# Patient Record
Sex: MALE | Race: OTHER | HISPANIC OR LATINO | URBAN - METROPOLITAN AREA
[De-identification: names, ages, dates, MRNs, and addresses within clinical notes are randomized per-mention and may not be internally consistent; named-entity substitution may affect disease eponyms.]

---

## 2019-01-01 ENCOUNTER — EMERGENCY (EMERGENCY)
Facility: HOSPITAL | Age: 59
LOS: 1 days | Discharge: HOME | End: 2019-01-01
Attending: EMERGENCY MEDICINE | Admitting: EMERGENCY MEDICINE
Payer: COMMERCIAL

## 2019-01-01 ENCOUNTER — EMERGENCY (EMERGENCY)
Facility: HOSPITAL | Age: 59
LOS: 1 days | Discharge: HOME | End: 2019-01-01
Attending: EMERGENCY MEDICINE
Payer: COMMERCIAL

## 2019-01-01 VITALS
HEIGHT: 64 IN | SYSTOLIC BLOOD PRESSURE: 191 MMHG | DIASTOLIC BLOOD PRESSURE: 103 MMHG | TEMPERATURE: 98 F | OXYGEN SATURATION: 96 % | WEIGHT: 164.91 LBS | RESPIRATION RATE: 16 BRPM | HEART RATE: 96 BPM

## 2019-01-01 VITALS
SYSTOLIC BLOOD PRESSURE: 169 MMHG | RESPIRATION RATE: 16 BRPM | DIASTOLIC BLOOD PRESSURE: 97 MMHG | HEART RATE: 79 BPM | OXYGEN SATURATION: 97 %

## 2019-01-01 VITALS
RESPIRATION RATE: 16 BRPM | WEIGHT: 164.91 LBS | OXYGEN SATURATION: 95 % | TEMPERATURE: 97 F | SYSTOLIC BLOOD PRESSURE: 137 MMHG | HEART RATE: 75 BPM | DIASTOLIC BLOOD PRESSURE: 84 MMHG

## 2019-01-01 LAB
ALBUMIN SERPL ELPH-MCNC: 3.9 G/DL — SIGNIFICANT CHANGE UP (ref 3.3–5)
ALP SERPL-CCNC: 79 U/L — SIGNIFICANT CHANGE UP (ref 40–120)
ALT FLD-CCNC: 18 U/L DA — SIGNIFICANT CHANGE UP (ref 10–45)
ANION GAP SERPL CALC-SCNC: 10 MMOL/L — SIGNIFICANT CHANGE UP (ref 5–17)
APPEARANCE UR: CLEAR — SIGNIFICANT CHANGE UP
AST SERPL-CCNC: 23 U/L — SIGNIFICANT CHANGE UP (ref 10–40)
BACTERIA # UR AUTO: NEGATIVE /HPF — SIGNIFICANT CHANGE UP
BASOPHILS # BLD AUTO: 0.02 K/UL — SIGNIFICANT CHANGE UP (ref 0–0.2)
BASOPHILS NFR BLD AUTO: 0.4 % — SIGNIFICANT CHANGE UP (ref 0–2)
BILIRUB SERPL-MCNC: 0.5 MG/DL — SIGNIFICANT CHANGE UP (ref 0.2–1.2)
BILIRUB UR-MCNC: NEGATIVE — SIGNIFICANT CHANGE UP
BUN SERPL-MCNC: 15 MG/DL — SIGNIFICANT CHANGE UP (ref 7–23)
CALCIUM SERPL-MCNC: 9.1 MG/DL — SIGNIFICANT CHANGE UP (ref 8.4–10.5)
CHLORIDE SERPL-SCNC: 103 MMOL/L — SIGNIFICANT CHANGE UP (ref 96–108)
CO2 SERPL-SCNC: 28 MMOL/L — SIGNIFICANT CHANGE UP (ref 22–31)
COLOR SPEC: YELLOW — SIGNIFICANT CHANGE UP
CREAT SERPL-MCNC: 1.07 MG/DL — SIGNIFICANT CHANGE UP (ref 0.5–1.3)
CULTURE RESULTS: NO GROWTH — SIGNIFICANT CHANGE UP
DIFF PNL FLD: ABNORMAL
EOSINOPHIL # BLD AUTO: 0.03 K/UL — SIGNIFICANT CHANGE UP (ref 0–0.5)
EOSINOPHIL NFR BLD AUTO: 0.6 % — SIGNIFICANT CHANGE UP (ref 0–6)
EPI CELLS # UR: SIGNIFICANT CHANGE UP
GLUCOSE SERPL-MCNC: 103 MG/DL — HIGH (ref 70–99)
GLUCOSE UR QL: NEGATIVE — SIGNIFICANT CHANGE UP
HCT VFR BLD CALC: 44 % — SIGNIFICANT CHANGE UP (ref 39–50)
HGB BLD-MCNC: 14.9 G/DL — SIGNIFICANT CHANGE UP (ref 13–17)
IMM GRANULOCYTES NFR BLD AUTO: 0.4 % — SIGNIFICANT CHANGE UP (ref 0–1.5)
KETONES UR-MCNC: NEGATIVE — SIGNIFICANT CHANGE UP
LEUKOCYTE ESTERASE UR-ACNC: NEGATIVE — SIGNIFICANT CHANGE UP
LYMPHOCYTES # BLD AUTO: 2.07 K/UL — SIGNIFICANT CHANGE UP (ref 1–3.3)
LYMPHOCYTES # BLD AUTO: 43.8 % — SIGNIFICANT CHANGE UP (ref 13–44)
MCHC RBC-ENTMCNC: 31.8 PG — SIGNIFICANT CHANGE UP (ref 27–34)
MCHC RBC-ENTMCNC: 33.9 GM/DL — SIGNIFICANT CHANGE UP (ref 32–36)
MCV RBC AUTO: 94 FL — SIGNIFICANT CHANGE UP (ref 80–100)
MONOCYTES # BLD AUTO: 0.39 K/UL — SIGNIFICANT CHANGE UP (ref 0–0.9)
MONOCYTES NFR BLD AUTO: 8.2 % — SIGNIFICANT CHANGE UP (ref 2–14)
NEUTROPHILS # BLD AUTO: 2.2 K/UL — SIGNIFICANT CHANGE UP (ref 1.8–7.4)
NEUTROPHILS NFR BLD AUTO: 46.6 % — SIGNIFICANT CHANGE UP (ref 43–77)
NITRITE UR-MCNC: NEGATIVE — SIGNIFICANT CHANGE UP
NRBC # BLD: 0 /100 WBCS — SIGNIFICANT CHANGE UP (ref 0–0)
PCP SPEC-MCNC: SIGNIFICANT CHANGE UP
PH UR: 5 — SIGNIFICANT CHANGE UP (ref 5–8)
PLATELET # BLD AUTO: 293 K/UL — SIGNIFICANT CHANGE UP (ref 150–400)
POTASSIUM SERPL-MCNC: 4.3 MMOL/L — SIGNIFICANT CHANGE UP (ref 3.5–5.3)
POTASSIUM SERPL-SCNC: 4.3 MMOL/L — SIGNIFICANT CHANGE UP (ref 3.5–5.3)
PROT SERPL-MCNC: 8.7 G/DL — HIGH (ref 6–8.3)
PROT UR-MCNC: 15
RBC # BLD: 4.68 M/UL — SIGNIFICANT CHANGE UP (ref 4.2–5.8)
RBC # FLD: 14.2 % — SIGNIFICANT CHANGE UP (ref 10.3–14.5)
RBC CASTS # UR COMP ASSIST: NEGATIVE /HPF — SIGNIFICANT CHANGE UP (ref 0–4)
SODIUM SERPL-SCNC: 141 MMOL/L — SIGNIFICANT CHANGE UP (ref 135–145)
SP GR SPEC: 1.02 — SIGNIFICANT CHANGE UP (ref 1.01–1.02)
SPECIMEN SOURCE: SIGNIFICANT CHANGE UP
UROBILINOGEN FLD QL: NEGATIVE — SIGNIFICANT CHANGE UP
WBC # BLD: 4.73 K/UL — SIGNIFICANT CHANGE UP (ref 3.8–10.5)
WBC # FLD AUTO: 4.73 K/UL — SIGNIFICANT CHANGE UP (ref 3.8–10.5)
WBC UR QL: NEGATIVE /HPF — SIGNIFICANT CHANGE UP (ref 0–5)

## 2019-01-01 PROCEDURE — 72125 CT NECK SPINE W/O DYE: CPT

## 2019-01-01 PROCEDURE — 70450 CT HEAD/BRAIN W/O DYE: CPT | Mod: 26

## 2019-01-01 PROCEDURE — 99283 EMERGENCY DEPT VISIT LOW MDM: CPT

## 2019-01-01 PROCEDURE — 36415 COLL VENOUS BLD VENIPUNCTURE: CPT

## 2019-01-01 PROCEDURE — 81001 URINALYSIS AUTO W/SCOPE: CPT

## 2019-01-01 PROCEDURE — 96374 THER/PROPH/DIAG INJ IV PUSH: CPT

## 2019-01-01 PROCEDURE — 87086 URINE CULTURE/COLONY COUNT: CPT

## 2019-01-01 PROCEDURE — 80053 COMPREHEN METABOLIC PANEL: CPT

## 2019-01-01 PROCEDURE — 72125 CT NECK SPINE W/O DYE: CPT | Mod: 26

## 2019-01-01 PROCEDURE — 99284 EMERGENCY DEPT VISIT MOD MDM: CPT | Mod: 25

## 2019-01-01 PROCEDURE — 85027 COMPLETE CBC AUTOMATED: CPT

## 2019-01-01 PROCEDURE — 99284 EMERGENCY DEPT VISIT MOD MDM: CPT

## 2019-01-01 PROCEDURE — 74176 CT ABD & PELVIS W/O CONTRAST: CPT | Mod: 26

## 2019-01-01 PROCEDURE — 80307 DRUG TEST PRSMV CHEM ANLYZR: CPT

## 2019-01-01 PROCEDURE — 70450 CT HEAD/BRAIN W/O DYE: CPT

## 2019-01-01 PROCEDURE — 74176 CT ABD & PELVIS W/O CONTRAST: CPT

## 2019-01-01 RX ORDER — KETOROLAC TROMETHAMINE 30 MG/ML
30 SYRINGE (ML) INJECTION ONCE
Refills: 0 | Status: DISCONTINUED | OUTPATIENT
Start: 2019-01-01 | End: 2019-01-01

## 2019-01-01 RX ADMIN — Medication 30 MILLIGRAM(S): at 10:04

## 2019-01-01 RX ADMIN — Medication 30 MILLIGRAM(S): at 09:49

## 2019-08-05 NOTE — ED PROVIDER NOTE - NSFOLLOWUPINSTRUCTIONS_ED_ALL_ED_FT
Worsening, continued or ANY new concerning symptoms return to the emergency department.    Follow up with your PMD within 48-72 hrs. Show copies of your reports given to you. Take tylenol 650mg every 6 hours as needed for pain.     Work note is attached per request.

## 2019-08-05 NOTE — ED ADULT NURSE NOTE - OBJECTIVE STATEMENT
Pt presents to ED with left flank pain. Pt states he has a history of kidney stones and he feels that this is the same pain. Pt states the pain started this morning and he has not taken any medications for it. Denies dysuria. Denies fever.

## 2019-08-05 NOTE — ED PROVIDER NOTE - CLINICAL SUMMARY MEDICAL DECISION MAKING FREE TEXT BOX
Pt presents to ED with left flank pain. Pt states he has a history of kidney stones and he feels that this is the same pain. Pt states the pain started this morning and he has not taken any medications for it. Denies dysuria. Denies fever. Pt very comfortable appearing. No distress. Says he needs a note for work. Patient without pulsatile abd mass. Pulses equal b/l. BP stable. Consider r/o stone. Provide note for work. He is asking to return on Wednesday.

## 2019-09-21 NOTE — ED PROVIDER NOTE - CARE PLAN
Principal Discharge DX:	Contusion of scalp, initial encounter  Secondary Diagnosis:	Alcoholic intoxication without complication

## 2019-09-21 NOTE — ED ADULT NURSE NOTE - OBJECTIVE STATEMENT
Pt states that he fell in his house and hit his head , pt also reports that he almost passed out. Pt admits that he has been drinking alcohol tonight.

## 2019-09-21 NOTE — ED PROVIDER NOTE - PATIENT PORTAL LINK FT
You can access the FollowMyHealth Patient Portal offered by [facility] by registering at the following website: [email or web address]. By joining [employer]’s FollowMyHealth portal, you will also be able to view your health information using other applications (apps) compatible with our system.

## 2019-09-21 NOTE — ED PROVIDER NOTE - CLINICAL SUMMARY MEDICAL DECISION MAKING FREE TEXT BOX
59 year old male etoh fall. vitals WNL. PE as above.  ct head/cervical spine WNL. utox negative. will dc. f/u PMD. return precuations given.

## 2019-09-21 NOTE — ED ADULT TRIAGE NOTE - CHIEF COMPLAINT QUOTE
I fell in my house and hit my head 30 min ago , I almost passed out I fell in my house and hit my head 30 min ago , I almost passed out. Pt admits that he has been drinking alcohol tonight.

## 2019-09-21 NOTE — ED ADULT NURSE NOTE - CHIEF COMPLAINT QUOTE
I fell in my house and hit my head 30 min ago , I almost passed out. Pt admits that he has been drinking alcohol tonight.

## 2019-09-21 NOTE — ED PROVIDER NOTE - MUSCULOSKELETAL, MLM
Spine appears normal, range of motion is not limited, no muscle or joint tenderness.BILATERAL SHOULDER NONTENDER WITH FULL ROM. NONTENDER TO PALPATION TO NECK AND BACK.

## 2019-09-21 NOTE — ED PROVIDER NOTE - OBJECTIVE STATEMENT
58 y/o M pt with no significant PMHx and no significant PSHx was brought into the ED via EMS s/p fall missing 1 step with alcohol intoxification. Pt denies loc and states he was able to get up with initial pain to bilateral shoulders which has resolved. Pt states he hit his head and has pain to the back of his head. Patient denies any other complaints. NKDA.

## 2019-09-21 NOTE — ED PROVIDER NOTE - ENMT, MLM
Airway patent, Nasal mucosa clear. Mouth with normal mucosa. Throat has no vesicles, no oropharyngeal exudates and uvula is midline. HEMATOMA OF OCCIPUT.

## 2020-01-01 ENCOUNTER — EMERGENCY (EMERGENCY)
Facility: HOSPITAL | Age: 60
LOS: 1 days | Discharge: HOME | End: 2020-01-01
Attending: EMERGENCY MEDICINE
Payer: COMMERCIAL

## 2020-01-01 ENCOUNTER — INPATIENT (INPATIENT)
Facility: HOSPITAL | Age: 60
LOS: 19 days | End: 2020-04-20
Attending: INTERNAL MEDICINE | Admitting: INTERNAL MEDICINE
Payer: COMMERCIAL

## 2020-01-01 ENCOUNTER — INPATIENT (INPATIENT)
Facility: HOSPITAL | Age: 60
LOS: 0 days | Discharge: HOME | DRG: 177 | End: 2020-04-01
Attending: HOSPITALIST | Admitting: HOSPITALIST
Payer: SELF-PAY

## 2020-01-01 VITALS
TEMPERATURE: 98 F | DIASTOLIC BLOOD PRESSURE: 66 MMHG | OXYGEN SATURATION: 100 % | RESPIRATION RATE: 22 BRPM | SYSTOLIC BLOOD PRESSURE: 116 MMHG | HEART RATE: 91 BPM

## 2020-01-01 VITALS
RESPIRATION RATE: 20 BRPM | SYSTOLIC BLOOD PRESSURE: 115 MMHG | HEART RATE: 114 BPM | HEIGHT: 64 IN | DIASTOLIC BLOOD PRESSURE: 88 MMHG | OXYGEN SATURATION: 94 % | TEMPERATURE: 101 F | WEIGHT: 166.23 LBS

## 2020-01-01 VITALS
OXYGEN SATURATION: 96 % | WEIGHT: 175.05 LBS | HEART RATE: 100 BPM | TEMPERATURE: 98 F | SYSTOLIC BLOOD PRESSURE: 170 MMHG | HEIGHT: 66 IN | DIASTOLIC BLOOD PRESSURE: 90 MMHG | RESPIRATION RATE: 17 BRPM

## 2020-01-01 VITALS — SYSTOLIC BLOOD PRESSURE: 121 MMHG | OXYGEN SATURATION: 100 % | DIASTOLIC BLOOD PRESSURE: 52 MMHG | HEART RATE: 94 BPM

## 2020-01-01 VITALS
DIASTOLIC BLOOD PRESSURE: 87 MMHG | HEIGHT: 64 IN | WEIGHT: 166.89 LBS | RESPIRATION RATE: 24 BRPM | SYSTOLIC BLOOD PRESSURE: 144 MMHG | OXYGEN SATURATION: 84 % | HEART RATE: 125 BPM | TEMPERATURE: 98 F

## 2020-01-01 DIAGNOSIS — J18.9 PNEUMONIA, UNSPECIFIED ORGANISM: ICD-10-CM

## 2020-01-01 DIAGNOSIS — B97.21 SARS-ASSOCIATED CORONAVIRUS AS THE CAUSE OF DISEASES CLASSIFIED ELSEWHERE: ICD-10-CM

## 2020-01-01 DIAGNOSIS — Z29.9 ENCOUNTER FOR PROPHYLACTIC MEASURES, UNSPECIFIED: ICD-10-CM

## 2020-01-01 LAB
ABO RH CONFIRMATION: SIGNIFICANT CHANGE UP
ALBUMIN SERPL ELPH-MCNC: 2 G/DL — LOW (ref 3.5–5.2)
ALBUMIN SERPL ELPH-MCNC: 2 G/DL — LOW (ref 3.5–5.2)
ALBUMIN SERPL ELPH-MCNC: 2.1 G/DL — LOW (ref 3.5–5.2)
ALBUMIN SERPL ELPH-MCNC: 2.2 G/DL — LOW (ref 3.5–5.2)
ALBUMIN SERPL ELPH-MCNC: 2.2 G/DL — LOW (ref 3.5–5.2)
ALBUMIN SERPL ELPH-MCNC: 2.3 G/DL — LOW (ref 3.5–5.2)
ALBUMIN SERPL ELPH-MCNC: 2.4 G/DL — LOW (ref 3.5–5.2)
ALBUMIN SERPL ELPH-MCNC: 2.4 G/DL — LOW (ref 3.5–5.2)
ALBUMIN SERPL ELPH-MCNC: 2.5 G/DL — LOW (ref 3.5–5.2)
ALBUMIN SERPL ELPH-MCNC: 2.5 G/DL — LOW (ref 3.5–5.2)
ALBUMIN SERPL ELPH-MCNC: 2.6 G/DL — LOW (ref 3.5–5.2)
ALBUMIN SERPL ELPH-MCNC: 2.7 G/DL — LOW (ref 3.5–5.2)
ALBUMIN SERPL ELPH-MCNC: 2.9 G/DL — LOW (ref 3.5–5)
ALBUMIN SERPL ELPH-MCNC: 3 G/DL — LOW (ref 3.5–5.2)
ALBUMIN SERPL ELPH-MCNC: 3 G/DL — LOW (ref 3.5–5.2)
ALBUMIN SERPL ELPH-MCNC: 3.2 G/DL — LOW (ref 3.5–5.2)
ALBUMIN SERPL ELPH-MCNC: 3.4 G/DL — LOW (ref 3.5–5.2)
ALP SERPL-CCNC: 52 U/L — SIGNIFICANT CHANGE UP (ref 30–115)
ALP SERPL-CCNC: 52 U/L — SIGNIFICANT CHANGE UP (ref 30–115)
ALP SERPL-CCNC: 55 U/L — SIGNIFICANT CHANGE UP (ref 30–115)
ALP SERPL-CCNC: 60 U/L — SIGNIFICANT CHANGE UP (ref 30–115)
ALP SERPL-CCNC: 60 U/L — SIGNIFICANT CHANGE UP (ref 30–115)
ALP SERPL-CCNC: 61 U/L — SIGNIFICANT CHANGE UP (ref 30–115)
ALP SERPL-CCNC: 61 U/L — SIGNIFICANT CHANGE UP (ref 30–115)
ALP SERPL-CCNC: 62 U/L — SIGNIFICANT CHANGE UP (ref 30–115)
ALP SERPL-CCNC: 64 U/L — SIGNIFICANT CHANGE UP (ref 30–115)
ALP SERPL-CCNC: 64 U/L — SIGNIFICANT CHANGE UP (ref 30–115)
ALP SERPL-CCNC: 66 U/L — SIGNIFICANT CHANGE UP (ref 40–120)
ALP SERPL-CCNC: 68 U/L — SIGNIFICANT CHANGE UP (ref 30–115)
ALP SERPL-CCNC: 70 U/L — SIGNIFICANT CHANGE UP (ref 30–115)
ALP SERPL-CCNC: 71 U/L — SIGNIFICANT CHANGE UP (ref 30–115)
ALP SERPL-CCNC: 71 U/L — SIGNIFICANT CHANGE UP (ref 30–115)
ALP SERPL-CCNC: 73 U/L — SIGNIFICANT CHANGE UP (ref 30–115)
ALP SERPL-CCNC: 74 U/L — SIGNIFICANT CHANGE UP (ref 30–115)
ALP SERPL-CCNC: 75 U/L — SIGNIFICANT CHANGE UP (ref 30–115)
ALP SERPL-CCNC: 77 U/L — SIGNIFICANT CHANGE UP (ref 30–115)
ALT FLD-CCNC: 115 U/L — HIGH (ref 0–41)
ALT FLD-CCNC: 129 U/L — HIGH (ref 0–41)
ALT FLD-CCNC: 135 U/L — HIGH (ref 0–41)
ALT FLD-CCNC: 157 U/L — HIGH (ref 0–41)
ALT FLD-CCNC: 158 U/L — HIGH (ref 0–41)
ALT FLD-CCNC: 174 U/L — HIGH (ref 0–41)
ALT FLD-CCNC: 228 U/L — HIGH (ref 0–41)
ALT FLD-CCNC: 236 U/L — HIGH (ref 0–41)
ALT FLD-CCNC: 240 U/L — HIGH (ref 0–41)
ALT FLD-CCNC: 246 U/L — HIGH (ref 0–41)
ALT FLD-CCNC: 249 U/L — HIGH (ref 0–41)
ALT FLD-CCNC: 333 U/L — HIGH (ref 0–41)
ALT FLD-CCNC: 36 U/L — SIGNIFICANT CHANGE UP (ref 0–41)
ALT FLD-CCNC: 46 U/L — HIGH (ref 0–41)
ALT FLD-CCNC: 49 U/L — HIGH (ref 0–41)
ALT FLD-CCNC: 49 U/L — HIGH (ref 0–41)
ALT FLD-CCNC: 52 U/L — HIGH (ref 0–41)
ALT FLD-CCNC: 69 U/L — HIGH (ref 0–41)
ALT FLD-CCNC: 73 U/L — HIGH (ref 0–41)
ALT FLD-CCNC: 78 U/L DA — HIGH (ref 10–60)
ALT FLD-CCNC: 96 U/L — HIGH (ref 0–41)
ANION GAP SERPL CALC-SCNC: 12 MMOL/L — SIGNIFICANT CHANGE UP (ref 7–14)
ANION GAP SERPL CALC-SCNC: 13 MMOL/L — SIGNIFICANT CHANGE UP (ref 7–14)
ANION GAP SERPL CALC-SCNC: 14 MMOL/L — SIGNIFICANT CHANGE UP (ref 7–14)
ANION GAP SERPL CALC-SCNC: 15 MMOL/L — HIGH (ref 7–14)
ANION GAP SERPL CALC-SCNC: 16 MMOL/L — HIGH (ref 7–14)
ANION GAP SERPL CALC-SCNC: 16 MMOL/L — HIGH (ref 7–14)
ANION GAP SERPL CALC-SCNC: 17 MMOL/L — HIGH (ref 7–14)
ANION GAP SERPL CALC-SCNC: 18 MMOL/L — HIGH (ref 7–14)
ANION GAP SERPL CALC-SCNC: 21 MMOL/L — HIGH (ref 7–14)
ANION GAP SERPL CALC-SCNC: 21 MMOL/L — HIGH (ref 7–14)
ANION GAP SERPL CALC-SCNC: 22 MMOL/L — HIGH (ref 7–14)
ANION GAP SERPL CALC-SCNC: 22 MMOL/L — HIGH (ref 7–14)
ANION GAP SERPL CALC-SCNC: 23 MMOL/L — HIGH (ref 7–14)
ANION GAP SERPL CALC-SCNC: 29 MMOL/L — HIGH (ref 7–14)
ANION GAP SERPL CALC-SCNC: 8 MMOL/L — SIGNIFICANT CHANGE UP (ref 5–17)
ANION GAP SERPL CALC-SCNC: 9 MMOL/L — SIGNIFICANT CHANGE UP (ref 7–14)
ANISOCYTOSIS BLD QL: SLIGHT — SIGNIFICANT CHANGE UP
ANISOCYTOSIS BLD QL: SLIGHT — SIGNIFICANT CHANGE UP
APPEARANCE UR: ABNORMAL
APTT BLD: 25.6 SEC — LOW (ref 27–39.2)
AST SERPL-CCNC: 101 U/L — HIGH (ref 0–41)
AST SERPL-CCNC: 123 U/L — HIGH (ref 0–41)
AST SERPL-CCNC: 153 U/L — HIGH (ref 0–41)
AST SERPL-CCNC: 181 U/L — HIGH (ref 0–41)
AST SERPL-CCNC: 185 U/L — HIGH (ref 0–41)
AST SERPL-CCNC: 23 U/L — SIGNIFICANT CHANGE UP (ref 0–41)
AST SERPL-CCNC: 25 U/L — SIGNIFICANT CHANGE UP (ref 0–41)
AST SERPL-CCNC: 261 U/L — HIGH (ref 0–41)
AST SERPL-CCNC: 36 U/L — SIGNIFICANT CHANGE UP (ref 0–41)
AST SERPL-CCNC: 42 U/L — HIGH (ref 0–41)
AST SERPL-CCNC: 46 U/L — HIGH (ref 0–41)
AST SERPL-CCNC: 54 U/L — HIGH (ref 0–41)
AST SERPL-CCNC: 58 U/L — HIGH (ref 0–41)
AST SERPL-CCNC: 59 U/L — HIGH (ref 0–41)
AST SERPL-CCNC: 62 U/L — HIGH (ref 0–41)
AST SERPL-CCNC: 64 U/L — HIGH (ref 0–41)
AST SERPL-CCNC: 69 U/L — HIGH (ref 0–41)
AST SERPL-CCNC: 71 U/L — HIGH (ref 0–41)
AST SERPL-CCNC: 79 U/L — HIGH (ref 0–41)
AST SERPL-CCNC: 88 U/L — HIGH (ref 10–40)
AST SERPL-CCNC: 91 U/L — HIGH (ref 0–41)
BACTERIA # UR AUTO: NEGATIVE — SIGNIFICANT CHANGE UP
BASE EXCESS BLDA CALC-SCNC: -1.8 MMOL/L — SIGNIFICANT CHANGE UP (ref -2–2)
BASE EXCESS BLDA CALC-SCNC: -4.5 MMOL/L — LOW (ref -2–2)
BASE EXCESS BLDA CALC-SCNC: -5.1 MMOL/L — LOW (ref -2–2)
BASE EXCESS BLDA CALC-SCNC: -6.9 MMOL/L — LOW (ref -2–2)
BASE EXCESS BLDA CALC-SCNC: -7.3 MMOL/L — LOW (ref -2–2)
BASE EXCESS BLDA CALC-SCNC: 1.1 MMOL/L — SIGNIFICANT CHANGE UP (ref -2–2)
BASE EXCESS BLDA CALC-SCNC: 1.4 MMOL/L — SIGNIFICANT CHANGE UP (ref -2–2)
BASE EXCESS BLDA CALC-SCNC: 2.3 MMOL/L — HIGH (ref -2–2)
BASE EXCESS BLDA CALC-SCNC: 3.6 MMOL/L — HIGH (ref -2–2)
BASE EXCESS BLDA CALC-SCNC: 5.3 MMOL/L — HIGH (ref -2–2)
BASE EXCESS BLDA CALC-SCNC: 7.4 MMOL/L — HIGH (ref -2–2)
BASE EXCESS BLDA CALC-SCNC: 9.6 MMOL/L — HIGH (ref -2–2)
BASOPHILS # BLD AUTO: 0 K/UL — SIGNIFICANT CHANGE UP (ref 0–0.2)
BASOPHILS # BLD AUTO: 0 K/UL — SIGNIFICANT CHANGE UP (ref 0–0.2)
BASOPHILS # BLD AUTO: 0.01 K/UL — SIGNIFICANT CHANGE UP (ref 0–0.2)
BASOPHILS # BLD AUTO: 0.02 K/UL — SIGNIFICANT CHANGE UP (ref 0–0.2)
BASOPHILS # BLD AUTO: 0.03 K/UL — SIGNIFICANT CHANGE UP (ref 0–0.2)
BASOPHILS # BLD AUTO: 0.04 K/UL — SIGNIFICANT CHANGE UP (ref 0–0.2)
BASOPHILS # BLD AUTO: 0.05 K/UL — SIGNIFICANT CHANGE UP (ref 0–0.2)
BASOPHILS # BLD AUTO: 0.07 K/UL — SIGNIFICANT CHANGE UP (ref 0–0.2)
BASOPHILS # BLD AUTO: 0.16 K/UL — SIGNIFICANT CHANGE UP (ref 0–0.2)
BASOPHILS NFR BLD AUTO: 0 % — SIGNIFICANT CHANGE UP (ref 0–1)
BASOPHILS NFR BLD AUTO: 0 % — SIGNIFICANT CHANGE UP (ref 0–1)
BASOPHILS NFR BLD AUTO: 0.1 % — SIGNIFICANT CHANGE UP (ref 0–1)
BASOPHILS NFR BLD AUTO: 0.1 % — SIGNIFICANT CHANGE UP (ref 0–2)
BASOPHILS NFR BLD AUTO: 0.2 % — SIGNIFICANT CHANGE UP (ref 0–1)
BASOPHILS NFR BLD AUTO: 0.3 % — SIGNIFICANT CHANGE UP (ref 0–1)
BASOPHILS NFR BLD AUTO: 0.5 % — SIGNIFICANT CHANGE UP (ref 0–1)
BASOPHILS NFR BLD AUTO: 0.9 % — SIGNIFICANT CHANGE UP (ref 0–1)
BILIRUB SERPL-MCNC: 0.2 MG/DL — SIGNIFICANT CHANGE UP (ref 0.2–1.2)
BILIRUB SERPL-MCNC: 0.3 MG/DL — SIGNIFICANT CHANGE UP (ref 0.2–1.2)
BILIRUB SERPL-MCNC: 0.4 MG/DL — SIGNIFICANT CHANGE UP (ref 0.2–1.2)
BILIRUB SERPL-MCNC: 0.5 MG/DL — SIGNIFICANT CHANGE UP (ref 0.2–1.2)
BILIRUB SERPL-MCNC: 0.6 MG/DL — SIGNIFICANT CHANGE UP (ref 0.2–1.2)
BILIRUB SERPL-MCNC: 0.6 MG/DL — SIGNIFICANT CHANGE UP (ref 0.2–1.2)
BILIRUB SERPL-MCNC: 0.7 MG/DL — SIGNIFICANT CHANGE UP (ref 0.2–1.2)
BILIRUB SERPL-MCNC: <0.2 MG/DL — SIGNIFICANT CHANGE UP (ref 0.2–1.2)
BILIRUB UR-MCNC: NEGATIVE — SIGNIFICANT CHANGE UP
BLD GP AB SCN SERPL QL: SIGNIFICANT CHANGE UP
BUN SERPL-MCNC: 101 MG/DL — CRITICAL HIGH (ref 10–20)
BUN SERPL-MCNC: 102 MG/DL — CRITICAL HIGH (ref 10–20)
BUN SERPL-MCNC: 103 MG/DL — CRITICAL HIGH (ref 10–20)
BUN SERPL-MCNC: 107 MG/DL — CRITICAL HIGH (ref 10–20)
BUN SERPL-MCNC: 120 MG/DL — CRITICAL HIGH (ref 10–20)
BUN SERPL-MCNC: 126 MG/DL — CRITICAL HIGH (ref 10–20)
BUN SERPL-MCNC: 134 MG/DL — CRITICAL HIGH (ref 10–20)
BUN SERPL-MCNC: 134 MG/DL — CRITICAL HIGH (ref 10–20)
BUN SERPL-MCNC: 150 MG/DL — CRITICAL HIGH (ref 10–20)
BUN SERPL-MCNC: 150 MG/DL — CRITICAL HIGH (ref 10–20)
BUN SERPL-MCNC: 151 MG/DL — CRITICAL HIGH (ref 10–20)
BUN SERPL-MCNC: 154 MG/DL — CRITICAL HIGH (ref 10–20)
BUN SERPL-MCNC: 155 MG/DL — CRITICAL HIGH (ref 10–20)
BUN SERPL-MCNC: 161 MG/DL — CRITICAL HIGH (ref 10–20)
BUN SERPL-MCNC: 17 MG/DL — SIGNIFICANT CHANGE UP (ref 10–20)
BUN SERPL-MCNC: 172 MG/DL — CRITICAL HIGH (ref 10–20)
BUN SERPL-MCNC: 175 MG/DL — CRITICAL HIGH (ref 10–20)
BUN SERPL-MCNC: 19 MG/DL — HIGH (ref 7–18)
BUN SERPL-MCNC: 19 MG/DL — SIGNIFICANT CHANGE UP (ref 10–20)
BUN SERPL-MCNC: 41 MG/DL — HIGH (ref 10–20)
BUN SERPL-MCNC: 66 MG/DL — CRITICAL HIGH (ref 10–20)
BUN SERPL-MCNC: 82 MG/DL — CRITICAL HIGH (ref 10–20)
BUN SERPL-MCNC: 84 MG/DL — CRITICAL HIGH (ref 10–20)
BUN SERPL-MCNC: 88 MG/DL — CRITICAL HIGH (ref 10–20)
BUN SERPL-MCNC: 92 MG/DL — CRITICAL HIGH (ref 10–20)
BUN SERPL-MCNC: 95 MG/DL — CRITICAL HIGH (ref 10–20)
BUN SERPL-MCNC: 95 MG/DL — CRITICAL HIGH (ref 10–20)
BUN SERPL-MCNC: 97 MG/DL — CRITICAL HIGH (ref 10–20)
CALCIUM SERPL-MCNC: 10.2 MG/DL — HIGH (ref 8.5–10.1)
CALCIUM SERPL-MCNC: 12.6 MG/DL — HIGH (ref 8.5–10.1)
CALCIUM SERPL-MCNC: 6.6 MG/DL — LOW (ref 8.5–10.1)
CALCIUM SERPL-MCNC: 7.1 MG/DL — LOW (ref 8.5–10.1)
CALCIUM SERPL-MCNC: 7.3 MG/DL — LOW (ref 8.5–10.1)
CALCIUM SERPL-MCNC: 7.4 MG/DL — LOW (ref 8.5–10.1)
CALCIUM SERPL-MCNC: 7.5 MG/DL — LOW (ref 8.5–10.1)
CALCIUM SERPL-MCNC: 7.7 MG/DL — LOW (ref 8.5–10.1)
CALCIUM SERPL-MCNC: 7.9 MG/DL — LOW (ref 8.5–10.1)
CALCIUM SERPL-MCNC: 8.1 MG/DL — LOW (ref 8.5–10.1)
CALCIUM SERPL-MCNC: 8.3 MG/DL — LOW (ref 8.5–10.1)
CALCIUM SERPL-MCNC: 8.4 MG/DL — LOW (ref 8.5–10.1)
CALCIUM SERPL-MCNC: 8.5 MG/DL — SIGNIFICANT CHANGE UP (ref 8.5–10.1)
CALCIUM SERPL-MCNC: 8.6 MG/DL — SIGNIFICANT CHANGE UP (ref 8.5–10.1)
CALCIUM SERPL-MCNC: 8.7 MG/DL — SIGNIFICANT CHANGE UP (ref 8.5–10.1)
CALCIUM SERPL-MCNC: 8.8 MG/DL — SIGNIFICANT CHANGE UP (ref 8.5–10.1)
CALCIUM SERPL-MCNC: 8.9 MG/DL — SIGNIFICANT CHANGE UP (ref 8.5–10.1)
CALCIUM SERPL-MCNC: 8.9 MG/DL — SIGNIFICANT CHANGE UP (ref 8.5–10.1)
CALCIUM SERPL-MCNC: 9.1 MG/DL — SIGNIFICANT CHANGE UP (ref 8.4–10.5)
CALCIUM SERPL-MCNC: 9.2 MG/DL — SIGNIFICANT CHANGE UP (ref 8.5–10.1)
CALCIUM SERPL-MCNC: 9.6 MG/DL — SIGNIFICANT CHANGE UP (ref 8.5–10.1)
CHLORIDE SERPL-SCNC: 100 MMOL/L — SIGNIFICANT CHANGE UP (ref 98–110)
CHLORIDE SERPL-SCNC: 104 MMOL/L — SIGNIFICANT CHANGE UP (ref 98–110)
CHLORIDE SERPL-SCNC: 104 MMOL/L — SIGNIFICANT CHANGE UP (ref 98–110)
CHLORIDE SERPL-SCNC: 105 MMOL/L — SIGNIFICANT CHANGE UP (ref 98–110)
CHLORIDE SERPL-SCNC: 106 MMOL/L — SIGNIFICANT CHANGE UP (ref 98–110)
CHLORIDE SERPL-SCNC: 107 MMOL/L — SIGNIFICANT CHANGE UP (ref 98–110)
CHLORIDE SERPL-SCNC: 109 MMOL/L — SIGNIFICANT CHANGE UP (ref 98–110)
CHLORIDE SERPL-SCNC: 110 MMOL/L — SIGNIFICANT CHANGE UP (ref 98–110)
CHLORIDE SERPL-SCNC: 111 MMOL/L — HIGH (ref 96–108)
CHLORIDE SERPL-SCNC: 111 MMOL/L — HIGH (ref 98–110)
CHLORIDE SERPL-SCNC: 113 MMOL/L — HIGH (ref 98–110)
CHLORIDE SERPL-SCNC: 114 MMOL/L — HIGH (ref 98–110)
CHLORIDE SERPL-SCNC: 114 MMOL/L — HIGH (ref 98–110)
CHLORIDE SERPL-SCNC: 117 MMOL/L — HIGH (ref 98–110)
CHLORIDE SERPL-SCNC: 118 MMOL/L — HIGH (ref 98–110)
CHLORIDE SERPL-SCNC: 120 MMOL/L — HIGH (ref 98–110)
CHLORIDE SERPL-SCNC: 122 MMOL/L — HIGH (ref 98–110)
CHLORIDE SERPL-SCNC: 98 MMOL/L — SIGNIFICANT CHANGE UP (ref 98–110)
CHOLEST SERPL-MCNC: 138 MG/DL — SIGNIFICANT CHANGE UP (ref 100–200)
CK SERPL-CCNC: 117 U/L — SIGNIFICANT CHANGE UP (ref 0–225)
CK SERPL-CCNC: 391 U/L — HIGH (ref 0–225)
CK SERPL-CCNC: 398 U/L — HIGH (ref 0–225)
CK SERPL-CCNC: 60 U/L — SIGNIFICANT CHANGE UP (ref 0–225)
CK SERPL-CCNC: 685 U/L — HIGH (ref 0–225)
CO2 SERPL-SCNC: 12 MMOL/L — LOW (ref 17–32)
CO2 SERPL-SCNC: 15 MMOL/L — LOW (ref 17–32)
CO2 SERPL-SCNC: 15 MMOL/L — LOW (ref 17–32)
CO2 SERPL-SCNC: 17 MMOL/L — SIGNIFICANT CHANGE UP (ref 17–32)
CO2 SERPL-SCNC: 17 MMOL/L — SIGNIFICANT CHANGE UP (ref 17–32)
CO2 SERPL-SCNC: 18 MMOL/L — SIGNIFICANT CHANGE UP (ref 17–32)
CO2 SERPL-SCNC: 19 MMOL/L — SIGNIFICANT CHANGE UP (ref 17–32)
CO2 SERPL-SCNC: 20 MMOL/L — SIGNIFICANT CHANGE UP (ref 17–32)
CO2 SERPL-SCNC: 21 MMOL/L — LOW (ref 22–31)
CO2 SERPL-SCNC: 21 MMOL/L — SIGNIFICANT CHANGE UP (ref 17–32)
CO2 SERPL-SCNC: 21 MMOL/L — SIGNIFICANT CHANGE UP (ref 17–32)
CO2 SERPL-SCNC: 22 MMOL/L — SIGNIFICANT CHANGE UP (ref 17–32)
CO2 SERPL-SCNC: 23 MMOL/L — SIGNIFICANT CHANGE UP (ref 17–32)
CO2 SERPL-SCNC: 24 MMOL/L — SIGNIFICANT CHANGE UP (ref 17–32)
CO2 SERPL-SCNC: 26 MMOL/L — SIGNIFICANT CHANGE UP (ref 17–32)
CO2 SERPL-SCNC: 27 MMOL/L — SIGNIFICANT CHANGE UP (ref 17–32)
CO2 SERPL-SCNC: 27 MMOL/L — SIGNIFICANT CHANGE UP (ref 17–32)
CO2 SERPL-SCNC: 29 MMOL/L — SIGNIFICANT CHANGE UP (ref 17–32)
CO2 SERPL-SCNC: 30 MMOL/L — SIGNIFICANT CHANGE UP (ref 17–32)
CO2 SERPL-SCNC: 30 MMOL/L — SIGNIFICANT CHANGE UP (ref 17–32)
COLOR SPEC: SIGNIFICANT CHANGE UP
CREAT ?TM UR-MCNC: 51 MG/DL — SIGNIFICANT CHANGE UP
CREAT SERPL-MCNC: 1 MG/DL — SIGNIFICANT CHANGE UP (ref 0.7–1.5)
CREAT SERPL-MCNC: 1 MG/DL — SIGNIFICANT CHANGE UP (ref 0.7–1.5)
CREAT SERPL-MCNC: 1.1 MG/DL — SIGNIFICANT CHANGE UP (ref 0.5–1.3)
CREAT SERPL-MCNC: 1.9 MG/DL — HIGH (ref 0.7–1.5)
CREAT SERPL-MCNC: 2 MG/DL — HIGH (ref 0.7–1.5)
CREAT SERPL-MCNC: 2.3 MG/DL — HIGH (ref 0.7–1.5)
CREAT SERPL-MCNC: 2.3 MG/DL — HIGH (ref 0.7–1.5)
CREAT SERPL-MCNC: 2.4 MG/DL — HIGH (ref 0.7–1.5)
CREAT SERPL-MCNC: 2.5 MG/DL — HIGH (ref 0.7–1.5)
CREAT SERPL-MCNC: 2.5 MG/DL — HIGH (ref 0.7–1.5)
CREAT SERPL-MCNC: 2.6 MG/DL — HIGH (ref 0.7–1.5)
CREAT SERPL-MCNC: 2.6 MG/DL — HIGH (ref 0.7–1.5)
CREAT SERPL-MCNC: 2.7 MG/DL — HIGH (ref 0.7–1.5)
CREAT SERPL-MCNC: 2.9 MG/DL — HIGH (ref 0.7–1.5)
CREAT SERPL-MCNC: 3 MG/DL — HIGH (ref 0.7–1.5)
CREAT SERPL-MCNC: 3.1 MG/DL — HIGH (ref 0.7–1.5)
CREAT SERPL-MCNC: 3.3 MG/DL — HIGH (ref 0.7–1.5)
CREAT SERPL-MCNC: 3.3 MG/DL — HIGH (ref 0.7–1.5)
CREAT SERPL-MCNC: 3.4 MG/DL — HIGH (ref 0.7–1.5)
CREAT SERPL-MCNC: 3.6 MG/DL — HIGH (ref 0.7–1.5)
CREAT SERPL-MCNC: 3.7 MG/DL — HIGH (ref 0.7–1.5)
CREAT SERPL-MCNC: 3.8 MG/DL — HIGH (ref 0.7–1.5)
CREAT SERPL-MCNC: 3.9 MG/DL — HIGH (ref 0.7–1.5)
CREAT SERPL-MCNC: 3.9 MG/DL — HIGH (ref 0.7–1.5)
CREAT SERPL-MCNC: 4.4 MG/DL — CRITICAL HIGH (ref 0.7–1.5)
CREAT SERPL-MCNC: 4.8 MG/DL — CRITICAL HIGH (ref 0.7–1.5)
CRP SERPL-MCNC: 11.06 MG/DL — HIGH (ref 0–0.4)
CRP SERPL-MCNC: 12.22 MG/DL — HIGH (ref 0–0.4)
CRP SERPL-MCNC: 2.41 MG/DL — HIGH (ref 0–0.4)
CRP SERPL-MCNC: 2.87 MG/DL — HIGH (ref 0–0.4)
CRP SERPL-MCNC: 3.18 MG/DL — HIGH (ref 0–0.4)
CRP SERPL-MCNC: 32.58 MG/DL — HIGH (ref 0–0.4)
CRP SERPL-MCNC: 34.09 MG/DL — HIGH (ref 0–0.4)
CRP SERPL-MCNC: 35.09 MG/DL — HIGH (ref 0–0.4)
CRP SERPL-MCNC: 4.09 MG/DL — HIGH (ref 0–0.4)
CRP SERPL-MCNC: 4.91 MG/DL — HIGH (ref 0–0.4)
CRP SERPL-MCNC: 48.83 MG/DL — HIGH (ref 0–0.4)
CRP SERPL-MCNC: 5.01 MG/DL — HIGH (ref 0–0.4)
CRP SERPL-MCNC: 5.2 MG/DL — HIGH (ref 0–0.4)
CRP SERPL-MCNC: 8.05 MG/DL — HIGH (ref 0–0.4)
CRP SERPL-MCNC: 8.51 MG/DL — HIGH (ref 0–0.4)
CRP SERPL-MCNC: 8.74 MG/DL — HIGH (ref 0–0.4)
CULTURE RESULTS: SIGNIFICANT CHANGE UP
D DIMER BLD IA.RAPID-MCNC: 5439 NG/ML DDU — HIGH (ref 0–230)
D DIMER BLD IA.RAPID-MCNC: HIGH NG/ML DDU (ref 0–230)
DIFF PNL FLD: NEGATIVE — SIGNIFICANT CHANGE UP
EOSINOPHIL # BLD AUTO: 0 K/UL — SIGNIFICANT CHANGE UP (ref 0–0.5)
EOSINOPHIL # BLD AUTO: 0 K/UL — SIGNIFICANT CHANGE UP (ref 0–0.7)
EOSINOPHIL # BLD AUTO: 0.01 K/UL — SIGNIFICANT CHANGE UP (ref 0–0.7)
EOSINOPHIL # BLD AUTO: 0.03 K/UL — SIGNIFICANT CHANGE UP (ref 0–0.7)
EOSINOPHIL # BLD AUTO: 0.04 K/UL — SIGNIFICANT CHANGE UP (ref 0–0.7)
EOSINOPHIL # BLD AUTO: 0.05 K/UL — SIGNIFICANT CHANGE UP (ref 0–0.7)
EOSINOPHIL NFR BLD AUTO: 0 % — SIGNIFICANT CHANGE UP (ref 0–6)
EOSINOPHIL NFR BLD AUTO: 0 % — SIGNIFICANT CHANGE UP (ref 0–8)
EOSINOPHIL NFR BLD AUTO: 0.1 % — SIGNIFICANT CHANGE UP (ref 0–8)
EOSINOPHIL NFR BLD AUTO: 0.2 % — SIGNIFICANT CHANGE UP (ref 0–8)
EPI CELLS # UR: 2 /HPF — SIGNIFICANT CHANGE UP (ref 0–5)
ERYTHROCYTE [SEDIMENTATION RATE] IN BLOOD: 115 MM/HR — HIGH (ref 0–10)
ERYTHROCYTE [SEDIMENTATION RATE] IN BLOOD: 126 MM/HR — HIGH (ref 0–10)
ESTIMATED AVERAGE GLUCOSE: 131 MG/DL — HIGH (ref 68–114)
FERRITIN SERPL-MCNC: 1135 NG/ML — HIGH (ref 30–400)
FERRITIN SERPL-MCNC: 1886 NG/ML — HIGH (ref 30–400)
FERRITIN SERPL-MCNC: 2925 NG/ML — HIGH (ref 30–400)
FERRITIN SERPL-MCNC: 3535 NG/ML — HIGH (ref 30–400)
FERRITIN SERPL-MCNC: 3686 NG/ML — HIGH (ref 30–400)
FIBRINOGEN PPP-MCNC: >700 MG/DL — HIGH (ref 204.4–570.6)
FIBRINOGEN PPP-MCNC: >700 MG/DL — HIGH (ref 204.4–570.6)
GAS PNL BLDA: SIGNIFICANT CHANGE UP
GIANT PLATELETS BLD QL SMEAR: PRESENT — SIGNIFICANT CHANGE UP
GIANT PLATELETS BLD QL SMEAR: PRESENT — SIGNIFICANT CHANGE UP
GLUCOSE BLDC GLUCOMTR-MCNC: 102 MG/DL — HIGH (ref 70–99)
GLUCOSE BLDC GLUCOMTR-MCNC: 103 MG/DL — HIGH (ref 70–99)
GLUCOSE BLDC GLUCOMTR-MCNC: 105 MG/DL — HIGH (ref 70–99)
GLUCOSE BLDC GLUCOMTR-MCNC: 106 MG/DL — HIGH (ref 70–99)
GLUCOSE BLDC GLUCOMTR-MCNC: 109 MG/DL — HIGH (ref 70–99)
GLUCOSE BLDC GLUCOMTR-MCNC: 110 MG/DL — HIGH (ref 70–99)
GLUCOSE BLDC GLUCOMTR-MCNC: 111 MG/DL — HIGH (ref 70–99)
GLUCOSE BLDC GLUCOMTR-MCNC: 112 MG/DL — HIGH (ref 70–99)
GLUCOSE BLDC GLUCOMTR-MCNC: 112 MG/DL — HIGH (ref 70–99)
GLUCOSE BLDC GLUCOMTR-MCNC: 113 MG/DL — HIGH (ref 70–99)
GLUCOSE BLDC GLUCOMTR-MCNC: 115 MG/DL — HIGH (ref 70–99)
GLUCOSE BLDC GLUCOMTR-MCNC: 116 MG/DL — HIGH (ref 70–99)
GLUCOSE BLDC GLUCOMTR-MCNC: 117 MG/DL — HIGH (ref 70–99)
GLUCOSE BLDC GLUCOMTR-MCNC: 119 MG/DL — HIGH (ref 70–99)
GLUCOSE BLDC GLUCOMTR-MCNC: 120 MG/DL — HIGH (ref 70–99)
GLUCOSE BLDC GLUCOMTR-MCNC: 121 MG/DL — HIGH (ref 70–99)
GLUCOSE BLDC GLUCOMTR-MCNC: 121 MG/DL — HIGH (ref 70–99)
GLUCOSE BLDC GLUCOMTR-MCNC: 122 MG/DL — HIGH (ref 70–99)
GLUCOSE BLDC GLUCOMTR-MCNC: 122 MG/DL — HIGH (ref 70–99)
GLUCOSE BLDC GLUCOMTR-MCNC: 123 MG/DL — HIGH (ref 70–99)
GLUCOSE BLDC GLUCOMTR-MCNC: 125 MG/DL — HIGH (ref 70–99)
GLUCOSE BLDC GLUCOMTR-MCNC: 126 MG/DL — HIGH (ref 70–99)
GLUCOSE BLDC GLUCOMTR-MCNC: 126 MG/DL — HIGH (ref 70–99)
GLUCOSE BLDC GLUCOMTR-MCNC: 127 MG/DL — HIGH (ref 70–99)
GLUCOSE BLDC GLUCOMTR-MCNC: 128 MG/DL — HIGH (ref 70–99)
GLUCOSE BLDC GLUCOMTR-MCNC: 129 MG/DL — HIGH (ref 70–99)
GLUCOSE BLDC GLUCOMTR-MCNC: 130 MG/DL — HIGH (ref 70–99)
GLUCOSE BLDC GLUCOMTR-MCNC: 131 MG/DL — HIGH (ref 70–99)
GLUCOSE BLDC GLUCOMTR-MCNC: 132 MG/DL — HIGH (ref 70–99)
GLUCOSE BLDC GLUCOMTR-MCNC: 133 MG/DL — HIGH (ref 70–99)
GLUCOSE BLDC GLUCOMTR-MCNC: 134 MG/DL — HIGH (ref 70–99)
GLUCOSE BLDC GLUCOMTR-MCNC: 134 MG/DL — HIGH (ref 70–99)
GLUCOSE BLDC GLUCOMTR-MCNC: 135 MG/DL — HIGH (ref 70–99)
GLUCOSE BLDC GLUCOMTR-MCNC: 135 MG/DL — HIGH (ref 70–99)
GLUCOSE BLDC GLUCOMTR-MCNC: 136 MG/DL — HIGH (ref 70–99)
GLUCOSE BLDC GLUCOMTR-MCNC: 137 MG/DL — HIGH (ref 70–99)
GLUCOSE BLDC GLUCOMTR-MCNC: 137 MG/DL — HIGH (ref 70–99)
GLUCOSE BLDC GLUCOMTR-MCNC: 138 MG/DL — HIGH (ref 70–99)
GLUCOSE BLDC GLUCOMTR-MCNC: 138 MG/DL — HIGH (ref 70–99)
GLUCOSE BLDC GLUCOMTR-MCNC: 139 MG/DL — HIGH (ref 70–99)
GLUCOSE BLDC GLUCOMTR-MCNC: 140 MG/DL — HIGH (ref 70–99)
GLUCOSE BLDC GLUCOMTR-MCNC: 141 MG/DL — HIGH (ref 70–99)
GLUCOSE BLDC GLUCOMTR-MCNC: 141 MG/DL — HIGH (ref 70–99)
GLUCOSE BLDC GLUCOMTR-MCNC: 142 MG/DL — HIGH (ref 70–99)
GLUCOSE BLDC GLUCOMTR-MCNC: 142 MG/DL — HIGH (ref 70–99)
GLUCOSE BLDC GLUCOMTR-MCNC: 143 MG/DL — HIGH (ref 70–99)
GLUCOSE BLDC GLUCOMTR-MCNC: 144 MG/DL — HIGH (ref 70–99)
GLUCOSE BLDC GLUCOMTR-MCNC: 146 MG/DL — HIGH (ref 70–99)
GLUCOSE BLDC GLUCOMTR-MCNC: 147 MG/DL — HIGH (ref 70–99)
GLUCOSE BLDC GLUCOMTR-MCNC: 148 MG/DL — HIGH (ref 70–99)
GLUCOSE BLDC GLUCOMTR-MCNC: 149 MG/DL — HIGH (ref 70–99)
GLUCOSE BLDC GLUCOMTR-MCNC: 150 MG/DL — HIGH (ref 70–99)
GLUCOSE BLDC GLUCOMTR-MCNC: 151 MG/DL — HIGH (ref 70–99)
GLUCOSE BLDC GLUCOMTR-MCNC: 152 MG/DL — HIGH (ref 70–99)
GLUCOSE BLDC GLUCOMTR-MCNC: 153 MG/DL — HIGH (ref 70–99)
GLUCOSE BLDC GLUCOMTR-MCNC: 154 MG/DL — HIGH (ref 70–99)
GLUCOSE BLDC GLUCOMTR-MCNC: 155 MG/DL — HIGH (ref 70–99)
GLUCOSE BLDC GLUCOMTR-MCNC: 156 MG/DL — HIGH (ref 70–99)
GLUCOSE BLDC GLUCOMTR-MCNC: 156 MG/DL — HIGH (ref 70–99)
GLUCOSE BLDC GLUCOMTR-MCNC: 157 MG/DL — HIGH (ref 70–99)
GLUCOSE BLDC GLUCOMTR-MCNC: 158 MG/DL — HIGH (ref 70–99)
GLUCOSE BLDC GLUCOMTR-MCNC: 159 MG/DL — HIGH (ref 70–99)
GLUCOSE BLDC GLUCOMTR-MCNC: 160 MG/DL — HIGH (ref 70–99)
GLUCOSE BLDC GLUCOMTR-MCNC: 160 MG/DL — HIGH (ref 70–99)
GLUCOSE BLDC GLUCOMTR-MCNC: 161 MG/DL — HIGH (ref 70–99)
GLUCOSE BLDC GLUCOMTR-MCNC: 161 MG/DL — HIGH (ref 70–99)
GLUCOSE BLDC GLUCOMTR-MCNC: 162 MG/DL — HIGH (ref 70–99)
GLUCOSE BLDC GLUCOMTR-MCNC: 162 MG/DL — HIGH (ref 70–99)
GLUCOSE BLDC GLUCOMTR-MCNC: 163 MG/DL — HIGH (ref 70–99)
GLUCOSE BLDC GLUCOMTR-MCNC: 163 MG/DL — HIGH (ref 70–99)
GLUCOSE BLDC GLUCOMTR-MCNC: 165 MG/DL — HIGH (ref 70–99)
GLUCOSE BLDC GLUCOMTR-MCNC: 165 MG/DL — HIGH (ref 70–99)
GLUCOSE BLDC GLUCOMTR-MCNC: 167 MG/DL — HIGH (ref 70–99)
GLUCOSE BLDC GLUCOMTR-MCNC: 167 MG/DL — HIGH (ref 70–99)
GLUCOSE BLDC GLUCOMTR-MCNC: 168 MG/DL — HIGH (ref 70–99)
GLUCOSE BLDC GLUCOMTR-MCNC: 169 MG/DL — HIGH (ref 70–99)
GLUCOSE BLDC GLUCOMTR-MCNC: 170 MG/DL — HIGH (ref 70–99)
GLUCOSE BLDC GLUCOMTR-MCNC: 171 MG/DL — HIGH (ref 70–99)
GLUCOSE BLDC GLUCOMTR-MCNC: 172 MG/DL — HIGH (ref 70–99)
GLUCOSE BLDC GLUCOMTR-MCNC: 172 MG/DL — HIGH (ref 70–99)
GLUCOSE BLDC GLUCOMTR-MCNC: 173 MG/DL — HIGH (ref 70–99)
GLUCOSE BLDC GLUCOMTR-MCNC: 174 MG/DL — HIGH (ref 70–99)
GLUCOSE BLDC GLUCOMTR-MCNC: 176 MG/DL — HIGH (ref 70–99)
GLUCOSE BLDC GLUCOMTR-MCNC: 177 MG/DL — HIGH (ref 70–99)
GLUCOSE BLDC GLUCOMTR-MCNC: 177 MG/DL — HIGH (ref 70–99)
GLUCOSE BLDC GLUCOMTR-MCNC: 179 MG/DL — HIGH (ref 70–99)
GLUCOSE BLDC GLUCOMTR-MCNC: 180 MG/DL — HIGH (ref 70–99)
GLUCOSE BLDC GLUCOMTR-MCNC: 181 MG/DL — HIGH (ref 70–99)
GLUCOSE BLDC GLUCOMTR-MCNC: 181 MG/DL — HIGH (ref 70–99)
GLUCOSE BLDC GLUCOMTR-MCNC: 183 MG/DL — HIGH (ref 70–99)
GLUCOSE BLDC GLUCOMTR-MCNC: 183 MG/DL — HIGH (ref 70–99)
GLUCOSE BLDC GLUCOMTR-MCNC: 184 MG/DL — HIGH (ref 70–99)
GLUCOSE BLDC GLUCOMTR-MCNC: 185 MG/DL — HIGH (ref 70–99)
GLUCOSE BLDC GLUCOMTR-MCNC: 188 MG/DL — HIGH (ref 70–99)
GLUCOSE BLDC GLUCOMTR-MCNC: 188 MG/DL — HIGH (ref 70–99)
GLUCOSE BLDC GLUCOMTR-MCNC: 189 MG/DL — HIGH (ref 70–99)
GLUCOSE BLDC GLUCOMTR-MCNC: 189 MG/DL — HIGH (ref 70–99)
GLUCOSE BLDC GLUCOMTR-MCNC: 190 MG/DL — HIGH (ref 70–99)
GLUCOSE BLDC GLUCOMTR-MCNC: 191 MG/DL — HIGH (ref 70–99)
GLUCOSE BLDC GLUCOMTR-MCNC: 191 MG/DL — HIGH (ref 70–99)
GLUCOSE BLDC GLUCOMTR-MCNC: 192 MG/DL — HIGH (ref 70–99)
GLUCOSE BLDC GLUCOMTR-MCNC: 193 MG/DL — HIGH (ref 70–99)
GLUCOSE BLDC GLUCOMTR-MCNC: 194 MG/DL — HIGH (ref 70–99)
GLUCOSE BLDC GLUCOMTR-MCNC: 194 MG/DL — HIGH (ref 70–99)
GLUCOSE BLDC GLUCOMTR-MCNC: 195 MG/DL — HIGH (ref 70–99)
GLUCOSE BLDC GLUCOMTR-MCNC: 196 MG/DL — HIGH (ref 70–99)
GLUCOSE BLDC GLUCOMTR-MCNC: 196 MG/DL — HIGH (ref 70–99)
GLUCOSE BLDC GLUCOMTR-MCNC: 197 MG/DL — HIGH (ref 70–99)
GLUCOSE BLDC GLUCOMTR-MCNC: 197 MG/DL — HIGH (ref 70–99)
GLUCOSE BLDC GLUCOMTR-MCNC: 198 MG/DL — HIGH (ref 70–99)
GLUCOSE BLDC GLUCOMTR-MCNC: 198 MG/DL — HIGH (ref 70–99)
GLUCOSE BLDC GLUCOMTR-MCNC: 199 MG/DL — HIGH (ref 70–99)
GLUCOSE BLDC GLUCOMTR-MCNC: 200 MG/DL — HIGH (ref 70–99)
GLUCOSE BLDC GLUCOMTR-MCNC: 201 MG/DL — HIGH (ref 70–99)
GLUCOSE BLDC GLUCOMTR-MCNC: 203 MG/DL — HIGH (ref 70–99)
GLUCOSE BLDC GLUCOMTR-MCNC: 204 MG/DL — HIGH (ref 70–99)
GLUCOSE BLDC GLUCOMTR-MCNC: 205 MG/DL — HIGH (ref 70–99)
GLUCOSE BLDC GLUCOMTR-MCNC: 205 MG/DL — HIGH (ref 70–99)
GLUCOSE BLDC GLUCOMTR-MCNC: 206 MG/DL — HIGH (ref 70–99)
GLUCOSE BLDC GLUCOMTR-MCNC: 207 MG/DL — HIGH (ref 70–99)
GLUCOSE BLDC GLUCOMTR-MCNC: 208 MG/DL — HIGH (ref 70–99)
GLUCOSE BLDC GLUCOMTR-MCNC: 211 MG/DL — HIGH (ref 70–99)
GLUCOSE BLDC GLUCOMTR-MCNC: 212 MG/DL — HIGH (ref 70–99)
GLUCOSE BLDC GLUCOMTR-MCNC: 213 MG/DL — HIGH (ref 70–99)
GLUCOSE BLDC GLUCOMTR-MCNC: 214 MG/DL — HIGH (ref 70–99)
GLUCOSE BLDC GLUCOMTR-MCNC: 215 MG/DL — HIGH (ref 70–99)
GLUCOSE BLDC GLUCOMTR-MCNC: 216 MG/DL — HIGH (ref 70–99)
GLUCOSE BLDC GLUCOMTR-MCNC: 218 MG/DL — HIGH (ref 70–99)
GLUCOSE BLDC GLUCOMTR-MCNC: 218 MG/DL — HIGH (ref 70–99)
GLUCOSE BLDC GLUCOMTR-MCNC: 219 MG/DL — HIGH (ref 70–99)
GLUCOSE BLDC GLUCOMTR-MCNC: 219 MG/DL — HIGH (ref 70–99)
GLUCOSE BLDC GLUCOMTR-MCNC: 221 MG/DL — HIGH (ref 70–99)
GLUCOSE BLDC GLUCOMTR-MCNC: 222 MG/DL — HIGH (ref 70–99)
GLUCOSE BLDC GLUCOMTR-MCNC: 223 MG/DL — HIGH (ref 70–99)
GLUCOSE BLDC GLUCOMTR-MCNC: 225 MG/DL — HIGH (ref 70–99)
GLUCOSE BLDC GLUCOMTR-MCNC: 225 MG/DL — HIGH (ref 70–99)
GLUCOSE BLDC GLUCOMTR-MCNC: 228 MG/DL — HIGH (ref 70–99)
GLUCOSE BLDC GLUCOMTR-MCNC: 229 MG/DL — HIGH (ref 70–99)
GLUCOSE BLDC GLUCOMTR-MCNC: 229 MG/DL — HIGH (ref 70–99)
GLUCOSE BLDC GLUCOMTR-MCNC: 230 MG/DL — HIGH (ref 70–99)
GLUCOSE BLDC GLUCOMTR-MCNC: 231 MG/DL — HIGH (ref 70–99)
GLUCOSE BLDC GLUCOMTR-MCNC: 232 MG/DL — HIGH (ref 70–99)
GLUCOSE BLDC GLUCOMTR-MCNC: 232 MG/DL — HIGH (ref 70–99)
GLUCOSE BLDC GLUCOMTR-MCNC: 234 MG/DL — HIGH (ref 70–99)
GLUCOSE BLDC GLUCOMTR-MCNC: 236 MG/DL — HIGH (ref 70–99)
GLUCOSE BLDC GLUCOMTR-MCNC: 236 MG/DL — HIGH (ref 70–99)
GLUCOSE BLDC GLUCOMTR-MCNC: 237 MG/DL — HIGH (ref 70–99)
GLUCOSE BLDC GLUCOMTR-MCNC: 237 MG/DL — HIGH (ref 70–99)
GLUCOSE BLDC GLUCOMTR-MCNC: 238 MG/DL — HIGH (ref 70–99)
GLUCOSE BLDC GLUCOMTR-MCNC: 238 MG/DL — HIGH (ref 70–99)
GLUCOSE BLDC GLUCOMTR-MCNC: 241 MG/DL — HIGH (ref 70–99)
GLUCOSE BLDC GLUCOMTR-MCNC: 242 MG/DL — HIGH (ref 70–99)
GLUCOSE BLDC GLUCOMTR-MCNC: 242 MG/DL — HIGH (ref 70–99)
GLUCOSE BLDC GLUCOMTR-MCNC: 247 MG/DL — HIGH (ref 70–99)
GLUCOSE BLDC GLUCOMTR-MCNC: 249 MG/DL — HIGH (ref 70–99)
GLUCOSE BLDC GLUCOMTR-MCNC: 250 MG/DL — HIGH (ref 70–99)
GLUCOSE BLDC GLUCOMTR-MCNC: 251 MG/DL — HIGH (ref 70–99)
GLUCOSE BLDC GLUCOMTR-MCNC: 253 MG/DL — HIGH (ref 70–99)
GLUCOSE BLDC GLUCOMTR-MCNC: 253 MG/DL — HIGH (ref 70–99)
GLUCOSE BLDC GLUCOMTR-MCNC: 254 MG/DL — HIGH (ref 70–99)
GLUCOSE BLDC GLUCOMTR-MCNC: 257 MG/DL — HIGH (ref 70–99)
GLUCOSE BLDC GLUCOMTR-MCNC: 258 MG/DL — HIGH (ref 70–99)
GLUCOSE BLDC GLUCOMTR-MCNC: 258 MG/DL — HIGH (ref 70–99)
GLUCOSE BLDC GLUCOMTR-MCNC: 259 MG/DL — HIGH (ref 70–99)
GLUCOSE BLDC GLUCOMTR-MCNC: 265 MG/DL — HIGH (ref 70–99)
GLUCOSE BLDC GLUCOMTR-MCNC: 267 MG/DL — HIGH (ref 70–99)
GLUCOSE BLDC GLUCOMTR-MCNC: 267 MG/DL — HIGH (ref 70–99)
GLUCOSE BLDC GLUCOMTR-MCNC: 268 MG/DL — HIGH (ref 70–99)
GLUCOSE BLDC GLUCOMTR-MCNC: 271 MG/DL — HIGH (ref 70–99)
GLUCOSE BLDC GLUCOMTR-MCNC: 273 MG/DL — HIGH (ref 70–99)
GLUCOSE BLDC GLUCOMTR-MCNC: 274 MG/DL — HIGH (ref 70–99)
GLUCOSE BLDC GLUCOMTR-MCNC: 276 MG/DL — HIGH (ref 70–99)
GLUCOSE BLDC GLUCOMTR-MCNC: 277 MG/DL — HIGH (ref 70–99)
GLUCOSE BLDC GLUCOMTR-MCNC: 281 MG/DL — HIGH (ref 70–99)
GLUCOSE BLDC GLUCOMTR-MCNC: 281 MG/DL — HIGH (ref 70–99)
GLUCOSE BLDC GLUCOMTR-MCNC: 287 MG/DL — HIGH (ref 70–99)
GLUCOSE BLDC GLUCOMTR-MCNC: 295 MG/DL — HIGH (ref 70–99)
GLUCOSE BLDC GLUCOMTR-MCNC: 307 MG/DL — HIGH (ref 70–99)
GLUCOSE BLDC GLUCOMTR-MCNC: 331 MG/DL — HIGH (ref 70–99)
GLUCOSE BLDC GLUCOMTR-MCNC: 371 MG/DL — HIGH (ref 70–99)
GLUCOSE BLDC GLUCOMTR-MCNC: 382 MG/DL — HIGH (ref 70–99)
GLUCOSE BLDC GLUCOMTR-MCNC: 406 MG/DL — HIGH (ref 70–99)
GLUCOSE BLDC GLUCOMTR-MCNC: 464 MG/DL — CRITICAL HIGH (ref 70–99)
GLUCOSE BLDC GLUCOMTR-MCNC: 476 MG/DL — CRITICAL HIGH (ref 70–99)
GLUCOSE BLDC GLUCOMTR-MCNC: 74 MG/DL — SIGNIFICANT CHANGE UP (ref 70–99)
GLUCOSE BLDC GLUCOMTR-MCNC: 77 MG/DL — SIGNIFICANT CHANGE UP (ref 70–99)
GLUCOSE BLDC GLUCOMTR-MCNC: 84 MG/DL — SIGNIFICANT CHANGE UP (ref 70–99)
GLUCOSE BLDC GLUCOMTR-MCNC: 86 MG/DL — SIGNIFICANT CHANGE UP (ref 70–99)
GLUCOSE BLDC GLUCOMTR-MCNC: 86 MG/DL — SIGNIFICANT CHANGE UP (ref 70–99)
GLUCOSE BLDC GLUCOMTR-MCNC: 88 MG/DL — SIGNIFICANT CHANGE UP (ref 70–99)
GLUCOSE BLDC GLUCOMTR-MCNC: 93 MG/DL — SIGNIFICANT CHANGE UP (ref 70–99)
GLUCOSE SERPL-MCNC: 114 MG/DL — HIGH (ref 70–99)
GLUCOSE SERPL-MCNC: 116 MG/DL — HIGH (ref 70–99)
GLUCOSE SERPL-MCNC: 125 MG/DL — HIGH (ref 70–99)
GLUCOSE SERPL-MCNC: 129 MG/DL — HIGH (ref 70–99)
GLUCOSE SERPL-MCNC: 133 MG/DL — HIGH (ref 70–99)
GLUCOSE SERPL-MCNC: 137 MG/DL — HIGH (ref 70–99)
GLUCOSE SERPL-MCNC: 142 MG/DL — HIGH (ref 70–99)
GLUCOSE SERPL-MCNC: 159 MG/DL — HIGH (ref 70–99)
GLUCOSE SERPL-MCNC: 169 MG/DL — HIGH (ref 70–99)
GLUCOSE SERPL-MCNC: 180 MG/DL — HIGH (ref 70–99)
GLUCOSE SERPL-MCNC: 202 MG/DL — HIGH (ref 70–99)
GLUCOSE SERPL-MCNC: 204 MG/DL — HIGH (ref 70–99)
GLUCOSE SERPL-MCNC: 204 MG/DL — HIGH (ref 70–99)
GLUCOSE SERPL-MCNC: 212 MG/DL — HIGH (ref 70–99)
GLUCOSE SERPL-MCNC: 215 MG/DL — HIGH (ref 70–99)
GLUCOSE SERPL-MCNC: 217 MG/DL — HIGH (ref 70–99)
GLUCOSE SERPL-MCNC: 220 MG/DL — HIGH (ref 70–99)
GLUCOSE SERPL-MCNC: 229 MG/DL — HIGH (ref 70–99)
GLUCOSE SERPL-MCNC: 232 MG/DL — HIGH (ref 70–99)
GLUCOSE SERPL-MCNC: 239 MG/DL — HIGH (ref 70–99)
GLUCOSE SERPL-MCNC: 246 MG/DL — HIGH (ref 70–99)
GLUCOSE SERPL-MCNC: 255 MG/DL — HIGH (ref 70–99)
GLUCOSE SERPL-MCNC: 273 MG/DL — HIGH (ref 70–99)
GLUCOSE SERPL-MCNC: 275 MG/DL — HIGH (ref 70–99)
GLUCOSE SERPL-MCNC: 280 MG/DL — HIGH (ref 70–99)
GLUCOSE SERPL-MCNC: 298 MG/DL — HIGH (ref 70–99)
GLUCOSE SERPL-MCNC: 300 MG/DL — HIGH (ref 70–99)
GLUCOSE SERPL-MCNC: 303 MG/DL — HIGH (ref 70–99)
GLUCOSE SERPL-MCNC: 515 MG/DL — CRITICAL HIGH (ref 70–99)
GLUCOSE SERPL-MCNC: 97 MG/DL — SIGNIFICANT CHANGE UP (ref 70–99)
GLUCOSE UR QL: ABNORMAL
GRAM STN FLD: SIGNIFICANT CHANGE UP
HBA1C BLD-MCNC: 6.2 % — HIGH (ref 4–5.6)
HCO3 BLDA-SCNC: 19 MMOL/L — LOW (ref 23–27)
HCO3 BLDA-SCNC: 19 MMOL/L — LOW (ref 23–27)
HCO3 BLDA-SCNC: 21 MMOL/L — LOW (ref 23–27)
HCO3 BLDA-SCNC: 22 MMOL/L — LOW (ref 23–27)
HCO3 BLDA-SCNC: 24 MMOL/L — SIGNIFICANT CHANGE UP (ref 23–27)
HCO3 BLDA-SCNC: 25 MMOL/L — SIGNIFICANT CHANGE UP (ref 23–27)
HCO3 BLDA-SCNC: 26 MMOL/L — SIGNIFICANT CHANGE UP (ref 21–29)
HCO3 BLDA-SCNC: 27 MMOL/L — SIGNIFICANT CHANGE UP (ref 23–27)
HCO3 BLDA-SCNC: 27 MMOL/L — SIGNIFICANT CHANGE UP (ref 23–27)
HCO3 BLDA-SCNC: 29 MMOL/L — HIGH (ref 23–27)
HCO3 BLDA-SCNC: 32 MMOL/L — HIGH (ref 23–27)
HCO3 BLDA-SCNC: 33 MMOL/L — HIGH (ref 23–27)
HCT VFR BLD CALC: 20.3 % — LOW (ref 42–52)
HCT VFR BLD CALC: 20.8 % — LOW (ref 42–52)
HCT VFR BLD CALC: 21.1 % — LOW (ref 42–52)
HCT VFR BLD CALC: 23.2 % — LOW (ref 42–52)
HCT VFR BLD CALC: 23.4 % — LOW (ref 42–52)
HCT VFR BLD CALC: 23.4 % — LOW (ref 42–52)
HCT VFR BLD CALC: 25.5 % — LOW (ref 42–52)
HCT VFR BLD CALC: 27.6 % — LOW (ref 42–52)
HCT VFR BLD CALC: 27.8 % — LOW (ref 42–52)
HCT VFR BLD CALC: 30.5 % — LOW (ref 42–52)
HCT VFR BLD CALC: 30.8 % — LOW (ref 42–52)
HCT VFR BLD CALC: 31.8 % — LOW (ref 42–52)
HCT VFR BLD CALC: 31.8 % — LOW (ref 42–52)
HCT VFR BLD CALC: 32.5 % — LOW (ref 42–52)
HCT VFR BLD CALC: 33.7 % — LOW (ref 42–52)
HCT VFR BLD CALC: 34.1 % — LOW (ref 42–52)
HCT VFR BLD CALC: 35.4 % — LOW (ref 42–52)
HCT VFR BLD CALC: 37.1 % — LOW (ref 42–52)
HCT VFR BLD CALC: 38.7 % — LOW (ref 42–52)
HCT VFR BLD CALC: 38.7 % — LOW (ref 42–52)
HCT VFR BLD CALC: 38.8 % — LOW (ref 42–52)
HCT VFR BLD CALC: 39 % — LOW (ref 42–52)
HCT VFR BLD CALC: 40.2 % — LOW (ref 42–52)
HCT VFR BLD CALC: 40.4 % — LOW (ref 42–52)
HCT VFR BLD CALC: 41.6 % — SIGNIFICANT CHANGE UP (ref 39–50)
HDLC SERPL-MCNC: 13 MG/DL — LOW
HGB BLD-MCNC: 10 G/DL — LOW (ref 14–18)
HGB BLD-MCNC: 10.1 G/DL — LOW (ref 14–18)
HGB BLD-MCNC: 10.6 G/DL — LOW (ref 14–18)
HGB BLD-MCNC: 10.6 G/DL — LOW (ref 14–18)
HGB BLD-MCNC: 11 G/DL — LOW (ref 14–18)
HGB BLD-MCNC: 11.4 G/DL — LOW (ref 14–18)
HGB BLD-MCNC: 11.5 G/DL — LOW (ref 14–18)
HGB BLD-MCNC: 11.7 G/DL — LOW (ref 14–18)
HGB BLD-MCNC: 11.9 G/DL — LOW (ref 14–18)
HGB BLD-MCNC: 12.2 G/DL — LOW (ref 14–18)
HGB BLD-MCNC: 12.7 G/DL — LOW (ref 14–18)
HGB BLD-MCNC: 12.9 G/DL — LOW (ref 14–18)
HGB BLD-MCNC: 13.2 G/DL — LOW (ref 14–18)
HGB BLD-MCNC: 13.3 G/DL — LOW (ref 14–18)
HGB BLD-MCNC: 13.6 G/DL — LOW (ref 14–18)
HGB BLD-MCNC: 14.3 G/DL — SIGNIFICANT CHANGE UP (ref 13–17)
HGB BLD-MCNC: 6.5 G/DL — CRITICAL LOW (ref 14–18)
HGB BLD-MCNC: 6.7 G/DL — CRITICAL LOW (ref 14–18)
HGB BLD-MCNC: 6.7 G/DL — CRITICAL LOW (ref 14–18)
HGB BLD-MCNC: 7.1 G/DL — LOW (ref 14–18)
HGB BLD-MCNC: 7.6 G/DL — LOW (ref 14–18)
HGB BLD-MCNC: 7.7 G/DL — LOW (ref 14–18)
HGB BLD-MCNC: 8.3 G/DL — LOW (ref 14–18)
HGB BLD-MCNC: 8.8 G/DL — LOW (ref 14–18)
HGB BLD-MCNC: 9 G/DL — LOW (ref 14–18)
HOROWITZ INDEX BLDA+IHG-RTO: 35 — SIGNIFICANT CHANGE UP
HOROWITZ INDEX BLDA+IHG-RTO: 40 — SIGNIFICANT CHANGE UP
HOROWITZ INDEX BLDA+IHG-RTO: 40 — SIGNIFICANT CHANGE UP
HOROWITZ INDEX BLDA+IHG-RTO: 50 — SIGNIFICANT CHANGE UP
HYALINE CASTS # UR AUTO: 5 /LPF — SIGNIFICANT CHANGE UP (ref 0–7)
IL6 FLD-MCNC: 119 PG/ML — HIGH (ref 0–15.5)
IL6 FLD-MCNC: 44.9 PG/ML — HIGH (ref 0–15.5)
IMM GRANULOCYTES NFR BLD AUTO: 0.6 % — HIGH (ref 0.1–0.3)
IMM GRANULOCYTES NFR BLD AUTO: 0.6 % — SIGNIFICANT CHANGE UP (ref 0–1.5)
IMM GRANULOCYTES NFR BLD AUTO: 1.2 % — HIGH (ref 0.1–0.3)
IMM GRANULOCYTES NFR BLD AUTO: 1.5 % — HIGH (ref 0.1–0.3)
IMM GRANULOCYTES NFR BLD AUTO: 1.5 % — HIGH (ref 0.1–0.3)
IMM GRANULOCYTES NFR BLD AUTO: 1.7 % — HIGH (ref 0.1–0.3)
IMM GRANULOCYTES NFR BLD AUTO: 16.3 % — HIGH (ref 0.1–0.3)
IMM GRANULOCYTES NFR BLD AUTO: 2 % — HIGH (ref 0.1–0.3)
IMM GRANULOCYTES NFR BLD AUTO: 2.1 % — HIGH (ref 0.1–0.3)
IMM GRANULOCYTES NFR BLD AUTO: 2.1 % — HIGH (ref 0.1–0.3)
IMM GRANULOCYTES NFR BLD AUTO: 2.3 % — HIGH (ref 0.1–0.3)
IMM GRANULOCYTES NFR BLD AUTO: 3.1 % — HIGH (ref 0.1–0.3)
IMM GRANULOCYTES NFR BLD AUTO: 4.2 % — HIGH (ref 0.1–0.3)
IMM GRANULOCYTES NFR BLD AUTO: 4.2 % — HIGH (ref 0.1–0.3)
IMM GRANULOCYTES NFR BLD AUTO: 4.9 % — HIGH (ref 0.1–0.3)
IMM GRANULOCYTES NFR BLD AUTO: 5.3 % — HIGH (ref 0.1–0.3)
IMM GRANULOCYTES NFR BLD AUTO: 5.4 % — HIGH (ref 0.1–0.3)
IMM GRANULOCYTES NFR BLD AUTO: 7.2 % — HIGH (ref 0.1–0.3)
IMM GRANULOCYTES NFR BLD AUTO: 9 % — HIGH (ref 0.1–0.3)
INR BLD: 1.12 RATIO — SIGNIFICANT CHANGE UP (ref 0.65–1.3)
KETONES UR-MCNC: NEGATIVE — SIGNIFICANT CHANGE UP
LACTATE SERPL-SCNC: 1.6 MMOL/L — SIGNIFICANT CHANGE UP (ref 0.7–2)
LDH SERPL L TO P-CCNC: 1042 U/L — HIGH (ref 50–242)
LDH SERPL L TO P-CCNC: 1045 U/L — HIGH (ref 50–242)
LDH SERPL L TO P-CCNC: 1096 U/L — HIGH (ref 50–242)
LDH SERPL L TO P-CCNC: 1097 U/L — HIGH (ref 50–242)
LDH SERPL L TO P-CCNC: 522 U/L — HIGH (ref 50–242)
LDH SERPL L TO P-CCNC: 606 U/L — HIGH (ref 50–242)
LDH SERPL L TO P-CCNC: 643 U/L — HIGH (ref 50–242)
LDH SERPL L TO P-CCNC: 646 U/L — HIGH (ref 50–242)
LDH SERPL L TO P-CCNC: 753 U/L — HIGH (ref 50–242)
LDH SERPL L TO P-CCNC: 834 U/L — HIGH (ref 50–242)
LDH SERPL L TO P-CCNC: 892 U/L — HIGH (ref 50–242)
LDH SERPL L TO P-CCNC: 896 U/L — HIGH (ref 50–242)
LDH SERPL L TO P-CCNC: 909 U/L — HIGH (ref 50–242)
LDH SERPL L TO P-CCNC: 980 U/L — HIGH (ref 50–242)
LEUKOCYTE ESTERASE UR-ACNC: ABNORMAL
LIPID PNL WITH DIRECT LDL SERPL: 29 MG/DL — SIGNIFICANT CHANGE UP (ref 4–129)
LYMPHOCYTES # BLD AUTO: 0 % — LOW (ref 20.5–51.1)
LYMPHOCYTES # BLD AUTO: 0 K/UL — LOW (ref 1.2–3.4)
LYMPHOCYTES # BLD AUTO: 0.32 K/UL — LOW (ref 1.2–3.4)
LYMPHOCYTES # BLD AUTO: 0.37 K/UL — LOW (ref 1.2–3.4)
LYMPHOCYTES # BLD AUTO: 0.38 K/UL — LOW (ref 1.2–3.4)
LYMPHOCYTES # BLD AUTO: 0.4 K/UL — LOW (ref 1.2–3.4)
LYMPHOCYTES # BLD AUTO: 0.4 K/UL — LOW (ref 1.2–3.4)
LYMPHOCYTES # BLD AUTO: 0.41 K/UL — LOW (ref 1.2–3.4)
LYMPHOCYTES # BLD AUTO: 0.47 K/UL — LOW (ref 1.2–3.4)
LYMPHOCYTES # BLD AUTO: 0.61 K/UL — LOW (ref 1.2–3.4)
LYMPHOCYTES # BLD AUTO: 0.61 K/UL — LOW (ref 1–3.3)
LYMPHOCYTES # BLD AUTO: 0.65 K/UL — LOW (ref 1.2–3.4)
LYMPHOCYTES # BLD AUTO: 0.73 K/UL — LOW (ref 1.2–3.4)
LYMPHOCYTES # BLD AUTO: 0.8 K/UL — LOW (ref 1.2–3.4)
LYMPHOCYTES # BLD AUTO: 0.84 K/UL — LOW (ref 1.2–3.4)
LYMPHOCYTES # BLD AUTO: 0.86 K/UL — LOW (ref 1.2–3.4)
LYMPHOCYTES # BLD AUTO: 0.92 K/UL — LOW (ref 1.2–3.4)
LYMPHOCYTES # BLD AUTO: 0.98 K/UL — LOW (ref 1.2–3.4)
LYMPHOCYTES # BLD AUTO: 1.08 K/UL — LOW (ref 1.2–3.4)
LYMPHOCYTES # BLD AUTO: 1.19 K/UL — LOW (ref 1.2–3.4)
LYMPHOCYTES # BLD AUTO: 1.65 K/UL — SIGNIFICANT CHANGE UP (ref 1.2–3.4)
LYMPHOCYTES # BLD AUTO: 1.7 % — LOW (ref 20.5–51.1)
LYMPHOCYTES # BLD AUTO: 11.8 % — LOW (ref 20.5–51.1)
LYMPHOCYTES # BLD AUTO: 17.3 % — LOW (ref 20.5–51.1)
LYMPHOCYTES # BLD AUTO: 2.4 % — LOW (ref 20.5–51.1)
LYMPHOCYTES # BLD AUTO: 2.7 % — LOW (ref 20.5–51.1)
LYMPHOCYTES # BLD AUTO: 2.99 K/UL — SIGNIFICANT CHANGE UP (ref 1.2–3.4)
LYMPHOCYTES # BLD AUTO: 3.2 % — LOW (ref 20.5–51.1)
LYMPHOCYTES # BLD AUTO: 4 % — LOW (ref 20.5–51.1)
LYMPHOCYTES # BLD AUTO: 4 % — LOW (ref 20.5–51.1)
LYMPHOCYTES # BLD AUTO: 4.7 % — LOW (ref 20.5–51.1)
LYMPHOCYTES # BLD AUTO: 5 % — LOW (ref 20.5–51.1)
LYMPHOCYTES # BLD AUTO: 5.5 % — LOW (ref 20.5–51.1)
LYMPHOCYTES # BLD AUTO: 6.2 % — LOW (ref 20.5–51.1)
LYMPHOCYTES # BLD AUTO: 6.3 % — LOW (ref 20.5–51.1)
LYMPHOCYTES # BLD AUTO: 6.7 % — LOW (ref 20.5–51.1)
LYMPHOCYTES # BLD AUTO: 6.8 % — LOW (ref 20.5–51.1)
LYMPHOCYTES # BLD AUTO: 6.9 % — LOW (ref 20.5–51.1)
LYMPHOCYTES # BLD AUTO: 7.1 % — LOW (ref 20.5–51.1)
LYMPHOCYTES # BLD AUTO: 8.2 % — LOW (ref 20.5–51.1)
LYMPHOCYTES # BLD AUTO: 8.7 % — LOW (ref 13–44)
LYMPHOCYTES # BLD AUTO: 9.1 % — LOW (ref 20.5–51.1)
MACROCYTES BLD QL: SLIGHT — SIGNIFICANT CHANGE UP
MAGNESIUM SERPL-MCNC: 2.2 MG/DL — SIGNIFICANT CHANGE UP (ref 1.8–2.4)
MAGNESIUM SERPL-MCNC: 2.4 MG/DL — SIGNIFICANT CHANGE UP (ref 1.8–2.4)
MAGNESIUM SERPL-MCNC: 2.5 MG/DL — HIGH (ref 1.8–2.4)
MAGNESIUM SERPL-MCNC: 2.6 MG/DL — HIGH (ref 1.8–2.4)
MAGNESIUM SERPL-MCNC: 2.8 MG/DL — HIGH (ref 1.8–2.4)
MAGNESIUM SERPL-MCNC: 2.8 MG/DL — HIGH (ref 1.8–2.4)
MAGNESIUM SERPL-MCNC: 2.9 MG/DL — HIGH (ref 1.8–2.4)
MAGNESIUM SERPL-MCNC: 3 MG/DL — HIGH (ref 1.8–2.4)
MAGNESIUM SERPL-MCNC: 3.2 MG/DL — CRITICAL HIGH (ref 1.8–2.4)
MAGNESIUM SERPL-MCNC: 3.3 MG/DL — CRITICAL HIGH (ref 1.8–2.4)
MANUAL SMEAR VERIFICATION: SIGNIFICANT CHANGE UP
MANUAL SMEAR VERIFICATION: SIGNIFICANT CHANGE UP
MCHC RBC-ENTMCNC: 29.5 G/DL — LOW (ref 32–37)
MCHC RBC-ENTMCNC: 29.8 PG — SIGNIFICANT CHANGE UP (ref 27–31)
MCHC RBC-ENTMCNC: 30.3 PG — SIGNIFICANT CHANGE UP (ref 27–31)
MCHC RBC-ENTMCNC: 30.4 PG — SIGNIFICANT CHANGE UP (ref 27–31)
MCHC RBC-ENTMCNC: 30.5 PG — SIGNIFICANT CHANGE UP (ref 27–31)
MCHC RBC-ENTMCNC: 30.6 G/DL — LOW (ref 32–37)
MCHC RBC-ENTMCNC: 30.6 PG — SIGNIFICANT CHANGE UP (ref 27–31)
MCHC RBC-ENTMCNC: 30.7 PG — SIGNIFICANT CHANGE UP (ref 27–31)
MCHC RBC-ENTMCNC: 31 PG — SIGNIFICANT CHANGE UP (ref 27–31)
MCHC RBC-ENTMCNC: 31.2 PG — HIGH (ref 27–31)
MCHC RBC-ENTMCNC: 31.2 PG — HIGH (ref 27–31)
MCHC RBC-ENTMCNC: 31.3 G/DL — LOW (ref 32–37)
MCHC RBC-ENTMCNC: 31.4 PG — HIGH (ref 27–31)
MCHC RBC-ENTMCNC: 31.4 PG — SIGNIFICANT CHANGE UP (ref 27–34)
MCHC RBC-ENTMCNC: 31.5 PG — HIGH (ref 27–31)
MCHC RBC-ENTMCNC: 31.6 PG — HIGH (ref 27–31)
MCHC RBC-ENTMCNC: 31.7 G/DL — LOW (ref 32–37)
MCHC RBC-ENTMCNC: 31.7 PG — HIGH (ref 27–31)
MCHC RBC-ENTMCNC: 31.8 G/DL — LOW (ref 32–37)
MCHC RBC-ENTMCNC: 31.8 PG — HIGH (ref 27–31)
MCHC RBC-ENTMCNC: 31.8 PG — HIGH (ref 27–31)
MCHC RBC-ENTMCNC: 31.9 PG — HIGH (ref 27–31)
MCHC RBC-ENTMCNC: 32 PG — HIGH (ref 27–31)
MCHC RBC-ENTMCNC: 32.3 PG — HIGH (ref 27–31)
MCHC RBC-ENTMCNC: 32.5 G/DL — SIGNIFICANT CHANGE UP (ref 32–37)
MCHC RBC-ENTMCNC: 32.6 G/DL — SIGNIFICANT CHANGE UP (ref 32–37)
MCHC RBC-ENTMCNC: 32.9 G/DL — SIGNIFICANT CHANGE UP (ref 32–37)
MCHC RBC-ENTMCNC: 32.9 G/DL — SIGNIFICANT CHANGE UP (ref 32–37)
MCHC RBC-ENTMCNC: 33 G/DL — SIGNIFICANT CHANGE UP (ref 32–37)
MCHC RBC-ENTMCNC: 33.1 G/DL — SIGNIFICANT CHANGE UP (ref 32–37)
MCHC RBC-ENTMCNC: 33.1 G/DL — SIGNIFICANT CHANGE UP (ref 32–37)
MCHC RBC-ENTMCNC: 33.3 G/DL — SIGNIFICANT CHANGE UP (ref 32–37)
MCHC RBC-ENTMCNC: 33.4 G/DL — SIGNIFICANT CHANGE UP (ref 32–37)
MCHC RBC-ENTMCNC: 33.6 PG — HIGH (ref 27–31)
MCHC RBC-ENTMCNC: 33.8 G/DL — SIGNIFICANT CHANGE UP (ref 32–37)
MCHC RBC-ENTMCNC: 34 G/DL — SIGNIFICANT CHANGE UP (ref 32–37)
MCHC RBC-ENTMCNC: 34.4 G/DL — SIGNIFICANT CHANGE UP (ref 32–37)
MCHC RBC-ENTMCNC: 34.4 GM/DL — SIGNIFICANT CHANGE UP (ref 32–36)
MCHC RBC-ENTMCNC: 35.4 G/DL — SIGNIFICANT CHANGE UP (ref 32–37)
MCV RBC AUTO: 100.4 FL — HIGH (ref 80–94)
MCV RBC AUTO: 102 FL — HIGH (ref 80–94)
MCV RBC AUTO: 103.6 FL — HIGH (ref 80–94)
MCV RBC AUTO: 91.2 FL — SIGNIFICANT CHANGE UP (ref 80–100)
MCV RBC AUTO: 91.7 FL — SIGNIFICANT CHANGE UP (ref 80–94)
MCV RBC AUTO: 93 FL — SIGNIFICANT CHANGE UP (ref 80–94)
MCV RBC AUTO: 93.3 FL — SIGNIFICANT CHANGE UP (ref 80–94)
MCV RBC AUTO: 93.5 FL — SIGNIFICANT CHANGE UP (ref 80–94)
MCV RBC AUTO: 93.5 FL — SIGNIFICANT CHANGE UP (ref 80–94)
MCV RBC AUTO: 93.9 FL — SIGNIFICANT CHANGE UP (ref 80–94)
MCV RBC AUTO: 94.1 FL — HIGH (ref 80–94)
MCV RBC AUTO: 94.6 FL — HIGH (ref 80–94)
MCV RBC AUTO: 95 FL — HIGH (ref 80–94)
MCV RBC AUTO: 95.3 FL — HIGH (ref 80–94)
MCV RBC AUTO: 95.5 FL — HIGH (ref 80–94)
MCV RBC AUTO: 95.9 FL — HIGH (ref 80–94)
MCV RBC AUTO: 95.9 FL — HIGH (ref 80–94)
MCV RBC AUTO: 96.1 FL — HIGH (ref 80–94)
MCV RBC AUTO: 96.2 FL — HIGH (ref 80–94)
MCV RBC AUTO: 96.3 FL — HIGH (ref 80–94)
MCV RBC AUTO: 96.3 FL — HIGH (ref 80–94)
MCV RBC AUTO: 96.7 FL — HIGH (ref 80–94)
MCV RBC AUTO: 96.9 FL — HIGH (ref 80–94)
MCV RBC AUTO: 97.5 FL — HIGH (ref 80–94)
MCV RBC AUTO: 98.1 FL — HIGH (ref 80–94)
MONOCYTES # BLD AUTO: 0.11 K/UL — SIGNIFICANT CHANGE UP (ref 0.1–0.6)
MONOCYTES # BLD AUTO: 0.2 K/UL — SIGNIFICANT CHANGE UP (ref 0.1–0.6)
MONOCYTES # BLD AUTO: 0.22 K/UL — SIGNIFICANT CHANGE UP (ref 0–0.9)
MONOCYTES # BLD AUTO: 0.24 K/UL — SIGNIFICANT CHANGE UP (ref 0.1–0.6)
MONOCYTES # BLD AUTO: 0.27 K/UL — SIGNIFICANT CHANGE UP (ref 0.1–0.6)
MONOCYTES # BLD AUTO: 0.34 K/UL — SIGNIFICANT CHANGE UP (ref 0.1–0.6)
MONOCYTES # BLD AUTO: 0.35 K/UL — SIGNIFICANT CHANGE UP (ref 0.1–0.6)
MONOCYTES # BLD AUTO: 0.37 K/UL — SIGNIFICANT CHANGE UP (ref 0.1–0.6)
MONOCYTES # BLD AUTO: 0.48 K/UL — SIGNIFICANT CHANGE UP (ref 0.1–0.6)
MONOCYTES # BLD AUTO: 0.49 K/UL — SIGNIFICANT CHANGE UP (ref 0.1–0.6)
MONOCYTES # BLD AUTO: 0.53 K/UL — SIGNIFICANT CHANGE UP (ref 0.1–0.6)
MONOCYTES # BLD AUTO: 0.62 K/UL — HIGH (ref 0.1–0.6)
MONOCYTES # BLD AUTO: 0.81 K/UL — HIGH (ref 0.1–0.6)
MONOCYTES # BLD AUTO: 0.92 K/UL — HIGH (ref 0.1–0.6)
MONOCYTES # BLD AUTO: 0.96 K/UL — HIGH (ref 0.1–0.6)
MONOCYTES # BLD AUTO: 1.01 K/UL — HIGH (ref 0.1–0.6)
MONOCYTES # BLD AUTO: 1.13 K/UL — HIGH (ref 0.1–0.6)
MONOCYTES # BLD AUTO: 1.25 K/UL — HIGH (ref 0.1–0.6)
MONOCYTES # BLD AUTO: 1.29 K/UL — HIGH (ref 0.1–0.6)
MONOCYTES # BLD AUTO: 1.53 K/UL — HIGH (ref 0.1–0.6)
MONOCYTES # BLD AUTO: 1.58 K/UL — HIGH (ref 0.1–0.6)
MONOCYTES NFR BLD AUTO: 0.9 % — LOW (ref 1.7–9.3)
MONOCYTES NFR BLD AUTO: 0.9 % — LOW (ref 1.7–9.3)
MONOCYTES NFR BLD AUTO: 10.8 % — HIGH (ref 1.7–9.3)
MONOCYTES NFR BLD AUTO: 2.4 % — SIGNIFICANT CHANGE UP (ref 1.7–9.3)
MONOCYTES NFR BLD AUTO: 2.6 % — SIGNIFICANT CHANGE UP (ref 1.7–9.3)
MONOCYTES NFR BLD AUTO: 2.8 % — SIGNIFICANT CHANGE UP (ref 1.7–9.3)
MONOCYTES NFR BLD AUTO: 3.2 % — SIGNIFICANT CHANGE UP (ref 2–14)
MONOCYTES NFR BLD AUTO: 3.5 % — SIGNIFICANT CHANGE UP (ref 1.7–9.3)
MONOCYTES NFR BLD AUTO: 3.5 % — SIGNIFICANT CHANGE UP (ref 1.7–9.3)
MONOCYTES NFR BLD AUTO: 3.7 % — SIGNIFICANT CHANGE UP (ref 1.7–9.3)
MONOCYTES NFR BLD AUTO: 4.7 % — SIGNIFICANT CHANGE UP (ref 1.7–9.3)
MONOCYTES NFR BLD AUTO: 4.7 % — SIGNIFICANT CHANGE UP (ref 1.7–9.3)
MONOCYTES NFR BLD AUTO: 5.3 % — SIGNIFICANT CHANGE UP (ref 1.7–9.3)
MONOCYTES NFR BLD AUTO: 5.4 % — SIGNIFICANT CHANGE UP (ref 1.7–9.3)
MONOCYTES NFR BLD AUTO: 5.8 % — SIGNIFICANT CHANGE UP (ref 1.7–9.3)
MONOCYTES NFR BLD AUTO: 6.2 % — SIGNIFICANT CHANGE UP (ref 1.7–9.3)
MONOCYTES NFR BLD AUTO: 6.6 % — SIGNIFICANT CHANGE UP (ref 1.7–9.3)
MONOCYTES NFR BLD AUTO: 6.7 % — SIGNIFICANT CHANGE UP (ref 1.7–9.3)
MONOCYTES NFR BLD AUTO: 8.2 % — SIGNIFICANT CHANGE UP (ref 1.7–9.3)
MONOCYTES NFR BLD AUTO: 8.5 % — SIGNIFICANT CHANGE UP (ref 1.7–9.3)
MONOCYTES NFR BLD AUTO: 8.9 % — SIGNIFICANT CHANGE UP (ref 1.7–9.3)
MRSA PCR RESULT.: NEGATIVE — SIGNIFICANT CHANGE UP
MYELOCYTES NFR BLD: 0.9 % — HIGH (ref 0–0)
MYELOCYTES NFR BLD: 0.9 % — HIGH (ref 0–0)
NEUTROPHILS # BLD AUTO: 10.33 K/UL — HIGH (ref 1.4–6.5)
NEUTROPHILS # BLD AUTO: 11.02 K/UL — HIGH (ref 1.4–6.5)
NEUTROPHILS # BLD AUTO: 11.73 K/UL — HIGH (ref 1.4–6.5)
NEUTROPHILS # BLD AUTO: 14.4 K/UL — HIGH (ref 1.4–6.5)
NEUTROPHILS # BLD AUTO: 15.03 K/UL — HIGH (ref 1.4–6.5)
NEUTROPHILS # BLD AUTO: 16.21 K/UL — HIGH (ref 1.4–6.5)
NEUTROPHILS # BLD AUTO: 18.56 K/UL — HIGH (ref 1.4–6.5)
NEUTROPHILS # BLD AUTO: 18.85 K/UL — HIGH (ref 1.4–6.5)
NEUTROPHILS # BLD AUTO: 19.3 K/UL — HIGH (ref 1.4–6.5)
NEUTROPHILS # BLD AUTO: 20.91 K/UL — HIGH (ref 1.4–6.5)
NEUTROPHILS # BLD AUTO: 20.98 K/UL — HIGH (ref 1.4–6.5)
NEUTROPHILS # BLD AUTO: 22.51 K/UL — HIGH (ref 1.4–6.5)
NEUTROPHILS # BLD AUTO: 23.77 K/UL — HIGH (ref 1.4–6.5)
NEUTROPHILS # BLD AUTO: 3.59 K/UL — SIGNIFICANT CHANGE UP (ref 1.4–6.5)
NEUTROPHILS # BLD AUTO: 4.32 K/UL — SIGNIFICANT CHANGE UP (ref 1.4–6.5)
NEUTROPHILS # BLD AUTO: 4.44 K/UL — SIGNIFICANT CHANGE UP (ref 1.4–6.5)
NEUTROPHILS # BLD AUTO: 5.39 K/UL — SIGNIFICANT CHANGE UP (ref 1.4–6.5)
NEUTROPHILS # BLD AUTO: 6.1 K/UL — SIGNIFICANT CHANGE UP (ref 1.8–7.4)
NEUTROPHILS # BLD AUTO: 6.28 K/UL — SIGNIFICANT CHANGE UP (ref 1.4–6.5)
NEUTROPHILS # BLD AUTO: 6.5 K/UL — SIGNIFICANT CHANGE UP (ref 1.4–6.5)
NEUTROPHILS # BLD AUTO: 9.66 K/UL — HIGH (ref 1.4–6.5)
NEUTROPHILS NFR BLD AUTO: 60 % — SIGNIFICANT CHANGE UP (ref 42.2–75.2)
NEUTROPHILS NFR BLD AUTO: 73.4 % — SIGNIFICANT CHANGE UP (ref 42.2–75.2)
NEUTROPHILS NFR BLD AUTO: 74.7 % — SIGNIFICANT CHANGE UP (ref 42.2–75.2)
NEUTROPHILS NFR BLD AUTO: 80.9 % — HIGH (ref 42.2–75.2)
NEUTROPHILS NFR BLD AUTO: 81.2 % — HIGH (ref 42.2–75.2)
NEUTROPHILS NFR BLD AUTO: 82.4 % — HIGH (ref 42.2–75.2)
NEUTROPHILS NFR BLD AUTO: 82.8 % — HIGH (ref 42.2–75.2)
NEUTROPHILS NFR BLD AUTO: 83.1 % — HIGH (ref 42.2–75.2)
NEUTROPHILS NFR BLD AUTO: 83.2 % — HIGH (ref 42.2–75.2)
NEUTROPHILS NFR BLD AUTO: 86 % — HIGH (ref 42.2–75.2)
NEUTROPHILS NFR BLD AUTO: 87.4 % — HIGH (ref 42.2–75.2)
NEUTROPHILS NFR BLD AUTO: 87.4 % — HIGH (ref 43–77)
NEUTROPHILS NFR BLD AUTO: 88 % — HIGH (ref 42.2–75.2)
NEUTROPHILS NFR BLD AUTO: 89.1 % — HIGH (ref 42.2–75.2)
NEUTROPHILS NFR BLD AUTO: 89.4 % — HIGH (ref 42.2–75.2)
NEUTROPHILS NFR BLD AUTO: 89.6 % — HIGH (ref 42.2–75.2)
NEUTROPHILS NFR BLD AUTO: 90 % — HIGH (ref 42.2–75.2)
NEUTROPHILS NFR BLD AUTO: 90.8 % — HIGH (ref 42.2–75.2)
NEUTROPHILS NFR BLD AUTO: 91.1 % — HIGH (ref 42.2–75.2)
NEUTROPHILS NFR BLD AUTO: 91.2 % — HIGH (ref 42.2–75.2)
NEUTROPHILS NFR BLD AUTO: 92.9 % — HIGH (ref 42.2–75.2)
NEUTS BAND # BLD: 1.7 % — SIGNIFICANT CHANGE UP (ref 0–6)
NEUTS BAND # BLD: 3.7 % — SIGNIFICANT CHANGE UP (ref 0–6)
NITRITE UR-MCNC: NEGATIVE — SIGNIFICANT CHANGE UP
NRBC # BLD: 0 /100 WBCS — SIGNIFICANT CHANGE UP (ref 0–0)
NT-PROBNP SERPL-SCNC: 1036 PG/ML — HIGH (ref 0–300)
NT-PROBNP SERPL-SCNC: 161 PG/ML — HIGH (ref 0–125)
NT-PROBNP SERPL-SCNC: 375 PG/ML — HIGH (ref 0–300)
OSMOLALITY UR: 496 MOS/KG — SIGNIFICANT CHANGE UP (ref 50–1400)
PCO2 BLDA: 29 MMHG — LOW (ref 38–42)
PCO2 BLDA: 29 MMHG — LOW (ref 38–42)
PCO2 BLDA: 31 MMHG — LOW (ref 38–42)
PCO2 BLDA: 37 MMHG — LOW (ref 38–42)
PCO2 BLDA: 40 MMHG — SIGNIFICANT CHANGE UP (ref 38–42)
PCO2 BLDA: 41 MMHG — SIGNIFICANT CHANGE UP (ref 38–42)
PCO2 BLDA: 45 MMHG — HIGH (ref 38–42)
PCO2 BLDA: 47 MMHG — HIGH (ref 38–42)
PCO2 BLDA: 50 MMHG — HIGH (ref 38–42)
PCO2 BLDA: 52 MMHG — HIGH (ref 38–42)
PH BLDA: 7.21 — LOW (ref 7.38–7.42)
PH BLDA: 7.27 — LOW (ref 7.38–7.42)
PH BLDA: 7.28 — LOW (ref 7.38–7.42)
PH BLDA: 7.33 — LOW (ref 7.38–7.42)
PH BLDA: 7.37 — LOW (ref 7.38–7.42)
PH BLDA: 7.41 — SIGNIFICANT CHANGE UP (ref 7.38–7.42)
PH BLDA: 7.42 — SIGNIFICANT CHANGE UP (ref 7.38–7.42)
PH BLDA: 7.42 — SIGNIFICANT CHANGE UP (ref 7.38–7.42)
PH BLDA: 7.46 — HIGH (ref 7.38–7.42)
PH BLDA: 7.5 — HIGH (ref 7.38–7.42)
PH BLDA: 7.52 — HIGH (ref 7.38–7.42)
PH BLDA: 7.58 — HIGH (ref 7.38–7.42)
PH UR: 5 — SIGNIFICANT CHANGE UP (ref 5–8)
PHOSPHATE SERPL-MCNC: 7.3 MG/DL — HIGH (ref 2.1–4.9)
PHOSPHATE SERPL-MCNC: 8.2 MG/DL — HIGH (ref 2.1–4.9)
PLAT MORPH BLD: NORMAL — SIGNIFICANT CHANGE UP
PLAT MORPH BLD: NORMAL — SIGNIFICANT CHANGE UP
PLATELET # BLD AUTO: 206 K/UL — SIGNIFICANT CHANGE UP (ref 130–400)
PLATELET # BLD AUTO: 219 K/UL — SIGNIFICANT CHANGE UP (ref 130–400)
PLATELET # BLD AUTO: 224 K/UL — SIGNIFICANT CHANGE UP (ref 130–400)
PLATELET # BLD AUTO: 228 K/UL — SIGNIFICANT CHANGE UP (ref 130–400)
PLATELET # BLD AUTO: 230 K/UL — SIGNIFICANT CHANGE UP (ref 130–400)
PLATELET # BLD AUTO: 240 K/UL — SIGNIFICANT CHANGE UP (ref 130–400)
PLATELET # BLD AUTO: 248 K/UL — SIGNIFICANT CHANGE UP (ref 130–400)
PLATELET # BLD AUTO: 251 K/UL — SIGNIFICANT CHANGE UP (ref 130–400)
PLATELET # BLD AUTO: 253 K/UL — SIGNIFICANT CHANGE UP (ref 130–400)
PLATELET # BLD AUTO: 256 K/UL — SIGNIFICANT CHANGE UP (ref 150–400)
PLATELET # BLD AUTO: 260 K/UL — SIGNIFICANT CHANGE UP (ref 130–400)
PLATELET # BLD AUTO: 260 K/UL — SIGNIFICANT CHANGE UP (ref 130–400)
PLATELET # BLD AUTO: 265 K/UL — SIGNIFICANT CHANGE UP (ref 130–400)
PLATELET # BLD AUTO: 265 K/UL — SIGNIFICANT CHANGE UP (ref 130–400)
PLATELET # BLD AUTO: 266 K/UL — SIGNIFICANT CHANGE UP (ref 130–400)
PLATELET # BLD AUTO: 274 K/UL — SIGNIFICANT CHANGE UP (ref 130–400)
PLATELET # BLD AUTO: 277 K/UL — SIGNIFICANT CHANGE UP (ref 130–400)
PLATELET # BLD AUTO: 279 K/UL — SIGNIFICANT CHANGE UP (ref 130–400)
PLATELET # BLD AUTO: 280 K/UL — SIGNIFICANT CHANGE UP (ref 130–400)
PLATELET # BLD AUTO: 284 K/UL — SIGNIFICANT CHANGE UP (ref 130–400)
PLATELET # BLD AUTO: 292 K/UL — SIGNIFICANT CHANGE UP (ref 130–400)
PLATELET # BLD AUTO: 298 K/UL — SIGNIFICANT CHANGE UP (ref 130–400)
PLATELET # BLD AUTO: 307 K/UL — SIGNIFICANT CHANGE UP (ref 130–400)
PLATELET # BLD AUTO: 360 K/UL — SIGNIFICANT CHANGE UP (ref 130–400)
PLATELET # BLD AUTO: 395 K/UL — SIGNIFICANT CHANGE UP (ref 130–400)
PO2 BLDA: 115 MMHG — HIGH (ref 78–95)
PO2 BLDA: 117 MMHG — HIGH (ref 78–95)
PO2 BLDA: 123 MMHG — HIGH (ref 78–95)
PO2 BLDA: 128 MMHG — HIGH (ref 78–95)
PO2 BLDA: 149 MMHG — HIGH (ref 78–95)
PO2 BLDA: 151 MMHG — HIGH (ref 78–95)
PO2 BLDA: 153 MMHG — HIGH (ref 78–95)
PO2 BLDA: 181 MMHG — HIGH (ref 78–95)
PO2 BLDA: 193 MMHG — HIGH (ref 78–95)
PO2 BLDA: 298 MMHG — HIGH (ref 78–95)
PO2 BLDA: 84 MMHG — SIGNIFICANT CHANGE UP (ref 78–95)
PO2 BLDA: 86 MMHG — SIGNIFICANT CHANGE UP (ref 78–95)
POLYCHROMASIA BLD QL SMEAR: SLIGHT — SIGNIFICANT CHANGE UP
POTASSIUM SERPL-MCNC: 3.6 MMOL/L — SIGNIFICANT CHANGE UP (ref 3.5–5)
POTASSIUM SERPL-MCNC: 3.8 MMOL/L — SIGNIFICANT CHANGE UP (ref 3.5–5.3)
POTASSIUM SERPL-MCNC: 4.2 MMOL/L — SIGNIFICANT CHANGE UP (ref 3.5–5)
POTASSIUM SERPL-MCNC: 4.3 MMOL/L — SIGNIFICANT CHANGE UP (ref 3.5–5)
POTASSIUM SERPL-MCNC: 4.4 MMOL/L — SIGNIFICANT CHANGE UP (ref 3.5–5)
POTASSIUM SERPL-MCNC: 4.5 MMOL/L — SIGNIFICANT CHANGE UP (ref 3.5–5)
POTASSIUM SERPL-MCNC: 4.7 MMOL/L — SIGNIFICANT CHANGE UP (ref 3.5–5)
POTASSIUM SERPL-MCNC: 4.7 MMOL/L — SIGNIFICANT CHANGE UP (ref 3.5–5)
POTASSIUM SERPL-MCNC: 4.8 MMOL/L — SIGNIFICANT CHANGE UP (ref 3.5–5)
POTASSIUM SERPL-MCNC: 5 MMOL/L — SIGNIFICANT CHANGE UP (ref 3.5–5)
POTASSIUM SERPL-MCNC: 5.1 MMOL/L — HIGH (ref 3.5–5)
POTASSIUM SERPL-MCNC: 5.3 MMOL/L — HIGH (ref 3.5–5)
POTASSIUM SERPL-MCNC: 5.6 MMOL/L — HIGH (ref 3.5–5)
POTASSIUM SERPL-MCNC: 5.6 MMOL/L — HIGH (ref 3.5–5)
POTASSIUM SERPL-MCNC: 5.7 MMOL/L — HIGH (ref 3.5–5)
POTASSIUM SERPL-MCNC: 5.7 MMOL/L — HIGH (ref 3.5–5)
POTASSIUM SERPL-MCNC: 6 MMOL/L — CRITICAL HIGH (ref 3.5–5)
POTASSIUM SERPL-MCNC: 6 MMOL/L — CRITICAL HIGH (ref 3.5–5)
POTASSIUM SERPL-MCNC: 6.2 MMOL/L — CRITICAL HIGH (ref 3.5–5)
POTASSIUM SERPL-MCNC: 6.8 MMOL/L — CRITICAL HIGH (ref 3.5–5)
POTASSIUM SERPL-MCNC: 7 MMOL/L — CRITICAL HIGH (ref 3.5–5)
POTASSIUM SERPL-MCNC: 7.1 MMOL/L — CRITICAL HIGH (ref 3.5–5)
POTASSIUM SERPL-MCNC: 7.1 MMOL/L — CRITICAL HIGH (ref 3.5–5)
POTASSIUM SERPL-MCNC: 7.3 MMOL/L — CRITICAL HIGH (ref 3.5–5)
POTASSIUM SERPL-SCNC: 3.6 MMOL/L — SIGNIFICANT CHANGE UP (ref 3.5–5)
POTASSIUM SERPL-SCNC: 3.8 MMOL/L — SIGNIFICANT CHANGE UP (ref 3.5–5.3)
POTASSIUM SERPL-SCNC: 4.2 MMOL/L — SIGNIFICANT CHANGE UP (ref 3.5–5)
POTASSIUM SERPL-SCNC: 4.3 MMOL/L — SIGNIFICANT CHANGE UP (ref 3.5–5)
POTASSIUM SERPL-SCNC: 4.4 MMOL/L — SIGNIFICANT CHANGE UP (ref 3.5–5)
POTASSIUM SERPL-SCNC: 4.5 MMOL/L — SIGNIFICANT CHANGE UP (ref 3.5–5)
POTASSIUM SERPL-SCNC: 4.7 MMOL/L — SIGNIFICANT CHANGE UP (ref 3.5–5)
POTASSIUM SERPL-SCNC: 4.7 MMOL/L — SIGNIFICANT CHANGE UP (ref 3.5–5)
POTASSIUM SERPL-SCNC: 4.8 MMOL/L — SIGNIFICANT CHANGE UP (ref 3.5–5)
POTASSIUM SERPL-SCNC: 5 MMOL/L — SIGNIFICANT CHANGE UP (ref 3.5–5)
POTASSIUM SERPL-SCNC: 5.1 MMOL/L — HIGH (ref 3.5–5)
POTASSIUM SERPL-SCNC: 5.3 MMOL/L — HIGH (ref 3.5–5)
POTASSIUM SERPL-SCNC: 5.6 MMOL/L — HIGH (ref 3.5–5)
POTASSIUM SERPL-SCNC: 5.6 MMOL/L — HIGH (ref 3.5–5)
POTASSIUM SERPL-SCNC: 5.7 MMOL/L — HIGH (ref 3.5–5)
POTASSIUM SERPL-SCNC: 5.7 MMOL/L — HIGH (ref 3.5–5)
POTASSIUM SERPL-SCNC: 6 MMOL/L — CRITICAL HIGH (ref 3.5–5)
POTASSIUM SERPL-SCNC: 6 MMOL/L — CRITICAL HIGH (ref 3.5–5)
POTASSIUM SERPL-SCNC: 6.2 MMOL/L — CRITICAL HIGH (ref 3.5–5)
POTASSIUM SERPL-SCNC: 6.8 MMOL/L — CRITICAL HIGH (ref 3.5–5)
POTASSIUM SERPL-SCNC: 7 MMOL/L — CRITICAL HIGH (ref 3.5–5)
POTASSIUM SERPL-SCNC: 7.1 MMOL/L — CRITICAL HIGH (ref 3.5–5)
POTASSIUM SERPL-SCNC: 7.1 MMOL/L — CRITICAL HIGH (ref 3.5–5)
POTASSIUM SERPL-SCNC: 7.3 MMOL/L — CRITICAL HIGH (ref 3.5–5)
PROCALCITONIN SERPL-MCNC: 0.2 NG/ML — HIGH (ref 0.02–0.1)
PROCALCITONIN SERPL-MCNC: 0.39 NG/ML — HIGH (ref 0.02–0.1)
PROCALCITONIN SERPL-MCNC: 0.59 NG/ML — HIGH (ref 0.02–0.1)
PROCALCITONIN SERPL-MCNC: 1.05 NG/ML — HIGH (ref 0.02–0.1)
PROCALCITONIN SERPL-MCNC: 1.17 NG/ML — HIGH (ref 0.02–0.1)
PROCALCITONIN SERPL-MCNC: 1.53 NG/ML — HIGH (ref 0.02–0.1)
PROCALCITONIN SERPL-MCNC: 2.48 NG/ML — HIGH (ref 0.02–0.1)
PROCALCITONIN SERPL-MCNC: 3.41 NG/ML — HIGH (ref 0.02–0.1)
PROCALCITONIN SERPL-MCNC: 4.01 NG/ML — HIGH (ref 0.02–0.1)
PROCALCITONIN SERPL-MCNC: 4.32 NG/ML — HIGH (ref 0.02–0.1)
PROCALCITONIN SERPL-MCNC: 4.55 NG/ML — HIGH (ref 0.02–0.1)
PROCALCITONIN SERPL-MCNC: 6.1 NG/ML — HIGH (ref 0.02–0.1)
PROMYELOCYTES # FLD: 0.9 % — HIGH (ref 0–0)
PROT ?TM UR-MCNC: 17 MG/DLG/24H — SIGNIFICANT CHANGE UP
PROT SERPL-MCNC: 4.4 G/DL — LOW (ref 6–8)
PROT SERPL-MCNC: 4.9 G/DL — LOW (ref 6–8)
PROT SERPL-MCNC: 5.2 G/DL — LOW (ref 6–8)
PROT SERPL-MCNC: 5.3 G/DL — LOW (ref 6–8)
PROT SERPL-MCNC: 5.4 G/DL — LOW (ref 6–8)
PROT SERPL-MCNC: 5.5 G/DL — LOW (ref 6–8)
PROT SERPL-MCNC: 5.5 G/DL — LOW (ref 6–8)
PROT SERPL-MCNC: 5.6 G/DL — LOW (ref 6–8)
PROT SERPL-MCNC: 5.7 G/DL — LOW (ref 6–8)
PROT SERPL-MCNC: 5.7 G/DL — LOW (ref 6–8)
PROT SERPL-MCNC: 5.9 G/DL — LOW (ref 6–8)
PROT SERPL-MCNC: 6.1 G/DL — SIGNIFICANT CHANGE UP (ref 6–8)
PROT SERPL-MCNC: 6.4 G/DL — SIGNIFICANT CHANGE UP (ref 6–8)
PROT SERPL-MCNC: 6.9 G/DL — SIGNIFICANT CHANGE UP (ref 6–8)
PROT SERPL-MCNC: 7.1 G/DL — SIGNIFICANT CHANGE UP (ref 6–8)
PROT SERPL-MCNC: 7.1 G/DL — SIGNIFICANT CHANGE UP (ref 6–8)
PROT SERPL-MCNC: 7.3 G/DL — SIGNIFICANT CHANGE UP (ref 6–8)
PROT SERPL-MCNC: 8.6 G/DL — HIGH (ref 6–8.3)
PROT UR-MCNC: SIGNIFICANT CHANGE UP
PROT/CREAT UR-RTO: 0.3 RATIO — HIGH (ref 0–0.2)
PROTHROM AB SERPL-ACNC: 12.9 SEC — HIGH (ref 9.95–12.87)
RBC # BLD: 2.04 M/UL — LOW (ref 4.7–6.1)
RBC # BLD: 2.1 M/UL — LOW (ref 4.7–6.1)
RBC # BLD: 2.19 M/UL — LOW (ref 4.7–6.1)
RBC # BLD: 2.31 M/UL — LOW (ref 4.7–6.1)
RBC # BLD: 2.4 M/UL — LOW (ref 4.7–6.1)
RBC # BLD: 2.43 M/UL — LOW (ref 4.7–6.1)
RBC # BLD: 2.6 M/UL — LOW (ref 4.7–6.1)
RBC # BLD: 2.87 M/UL — LOW (ref 4.7–6.1)
RBC # BLD: 2.9 M/UL — LOW (ref 4.7–6.1)
RBC # BLD: 3.24 M/UL — LOW (ref 4.7–6.1)
RBC # BLD: 3.31 M/UL — LOW (ref 4.7–6.1)
RBC # BLD: 3.36 M/UL — LOW (ref 4.7–6.1)
RBC # BLD: 3.36 M/UL — LOW (ref 4.7–6.1)
RBC # BLD: 3.42 M/UL — LOW (ref 4.7–6.1)
RBC # BLD: 3.53 M/UL — LOW (ref 4.7–6.1)
RBC # BLD: 3.58 M/UL — LOW (ref 4.7–6.1)
RBC # BLD: 3.69 M/UL — LOW (ref 4.7–6.1)
RBC # BLD: 3.83 M/UL — LOW (ref 4.7–6.1)
RBC # BLD: 3.9 M/UL — LOW (ref 4.7–6.1)
RBC # BLD: 4.12 M/UL — LOW (ref 4.7–6.1)
RBC # BLD: 4.15 M/UL — LOW (ref 4.7–6.1)
RBC # BLD: 4.16 M/UL — LOW (ref 4.7–6.1)
RBC # BLD: 4.18 M/UL — LOW (ref 4.7–6.1)
RBC # BLD: 4.3 M/UL — LOW (ref 4.7–6.1)
RBC # BLD: 4.56 M/UL — SIGNIFICANT CHANGE UP (ref 4.2–5.8)
RBC # FLD: 13.2 % — SIGNIFICANT CHANGE UP (ref 10.3–14.5)
RBC # FLD: 13.6 % — SIGNIFICANT CHANGE UP (ref 11.5–14.5)
RBC # FLD: 13.9 % — SIGNIFICANT CHANGE UP (ref 11.5–14.5)
RBC # FLD: 14.2 % — SIGNIFICANT CHANGE UP (ref 11.5–14.5)
RBC # FLD: 14.2 % — SIGNIFICANT CHANGE UP (ref 11.5–14.5)
RBC # FLD: 14.3 % — SIGNIFICANT CHANGE UP (ref 11.5–14.5)
RBC # FLD: 14.4 % — SIGNIFICANT CHANGE UP (ref 11.5–14.5)
RBC # FLD: 14.5 % — SIGNIFICANT CHANGE UP (ref 11.5–14.5)
RBC # FLD: 14.5 % — SIGNIFICANT CHANGE UP (ref 11.5–14.5)
RBC # FLD: 14.6 % — HIGH (ref 11.5–14.5)
RBC # FLD: 14.7 % — HIGH (ref 11.5–14.5)
RBC # FLD: 14.8 % — HIGH (ref 11.5–14.5)
RBC # FLD: 14.9 % — HIGH (ref 11.5–14.5)
RBC # FLD: 15 % — HIGH (ref 11.5–14.5)
RBC # FLD: 15.1 % — HIGH (ref 11.5–14.5)
RBC # FLD: 15.1 % — HIGH (ref 11.5–14.5)
RBC # FLD: 15.2 % — HIGH (ref 11.5–14.5)
RBC # FLD: 15.3 % — HIGH (ref 11.5–14.5)
RBC # FLD: 15.3 % — HIGH (ref 11.5–14.5)
RBC # FLD: 15.4 % — HIGH (ref 11.5–14.5)
RBC # FLD: 15.6 % — HIGH (ref 11.5–14.5)
RBC BLD AUTO: ABNORMAL
RBC BLD AUTO: NORMAL — SIGNIFICANT CHANGE UP
RBC CASTS # UR COMP ASSIST: 14 /HPF — HIGH (ref 0–4)
SAO2 % BLDA: 100 % — HIGH (ref 94–98)
SAO2 % BLDA: 100 % — HIGH (ref 94–98)
SAO2 % BLDA: 94 % — SIGNIFICANT CHANGE UP (ref 94–98)
SAO2 % BLDA: 95 % — SIGNIFICANT CHANGE UP (ref 94–98)
SAO2 % BLDA: 98 % — SIGNIFICANT CHANGE UP (ref 94–98)
SAO2 % BLDA: 99 % — HIGH (ref 92–96)
SAO2 % BLDA: 99 % — HIGH (ref 94–98)
SARS-COV-2 RNA SPEC QL NAA+PROBE: (no result)
SODIUM SERPL-SCNC: 139 MMOL/L — SIGNIFICANT CHANGE UP (ref 135–146)
SODIUM SERPL-SCNC: 140 MMOL/L — SIGNIFICANT CHANGE UP (ref 135–145)
SODIUM SERPL-SCNC: 141 MMOL/L — SIGNIFICANT CHANGE UP (ref 135–146)
SODIUM SERPL-SCNC: 141 MMOL/L — SIGNIFICANT CHANGE UP (ref 135–146)
SODIUM SERPL-SCNC: 142 MMOL/L — SIGNIFICANT CHANGE UP (ref 135–146)
SODIUM SERPL-SCNC: 143 MMOL/L — SIGNIFICANT CHANGE UP (ref 135–146)
SODIUM SERPL-SCNC: 144 MMOL/L — SIGNIFICANT CHANGE UP (ref 135–146)
SODIUM SERPL-SCNC: 145 MMOL/L — SIGNIFICANT CHANGE UP (ref 135–146)
SODIUM SERPL-SCNC: 146 MMOL/L — SIGNIFICANT CHANGE UP (ref 135–146)
SODIUM SERPL-SCNC: 146 MMOL/L — SIGNIFICANT CHANGE UP (ref 135–146)
SODIUM SERPL-SCNC: 147 MMOL/L — HIGH (ref 135–146)
SODIUM SERPL-SCNC: 148 MMOL/L — HIGH (ref 135–146)
SODIUM SERPL-SCNC: 148 MMOL/L — HIGH (ref 135–146)
SODIUM SERPL-SCNC: 149 MMOL/L — HIGH (ref 135–146)
SODIUM SERPL-SCNC: 149 MMOL/L — HIGH (ref 135–146)
SODIUM SERPL-SCNC: 150 MMOL/L — HIGH (ref 135–146)
SODIUM SERPL-SCNC: 151 MMOL/L — HIGH (ref 135–146)
SODIUM SERPL-SCNC: 152 MMOL/L — HIGH (ref 135–146)
SODIUM SERPL-SCNC: 153 MMOL/L — HIGH (ref 135–146)
SODIUM SERPL-SCNC: 154 MMOL/L — HIGH (ref 135–146)
SODIUM SERPL-SCNC: 155 MMOL/L — HIGH (ref 135–146)
SODIUM SERPL-SCNC: 155 MMOL/L — HIGH (ref 135–146)
SODIUM SERPL-SCNC: 156 MMOL/L — HIGH (ref 135–146)
SODIUM SERPL-SCNC: 157 MMOL/L — HIGH (ref 135–146)
SODIUM UR-SCNC: 44 MMOL/L — SIGNIFICANT CHANGE UP
SP GR SPEC: 1.01 — SIGNIFICANT CHANGE UP (ref 1.01–1.02)
SPECIMEN SOURCE: SIGNIFICANT CHANGE UP
TOTAL CHOLESTEROL/HDL RATIO MEASUREMENT: 10.6 RATIO — HIGH (ref 4–5.5)
TRIGL SERPL-MCNC: 281 MG/DL — HIGH (ref 10–149)
TRIGL SERPL-MCNC: 297 MG/DL — HIGH (ref 10–149)
TRIGL SERPL-MCNC: 375 MG/DL — HIGH (ref 10–149)
TRIGL SERPL-MCNC: 477 MG/DL — HIGH (ref 10–149)
TRIGL SERPL-MCNC: 510 MG/DL — HIGH (ref 10–149)
TROPONIN I SERPL-MCNC: <0.015 NG/ML — SIGNIFICANT CHANGE UP (ref 0–0.04)
TROPONIN T SERPL-MCNC: <0.01 NG/ML — SIGNIFICANT CHANGE UP
TROPONIN T SERPL-MCNC: <0.01 NG/ML — SIGNIFICANT CHANGE UP
TSH SERPL-MCNC: 0.04 UIU/ML — LOW (ref 0.27–4.2)
UROBILINOGEN FLD QL: SIGNIFICANT CHANGE UP
VANCOMYCIN FLD-MCNC: 24 UG/ML — HIGH (ref 5–10)
VANCOMYCIN TROUGH SERPL-MCNC: 21 UG/ML — HIGH (ref 5–10)
VARIANT LYMPHS # BLD: 0.9 % — SIGNIFICANT CHANGE UP (ref 0–5)
VARIANT LYMPHS # BLD: 1.8 % — SIGNIFICANT CHANGE UP (ref 0–5)
WBC # BLD: 11.72 K/UL — HIGH (ref 4.8–10.8)
WBC # BLD: 11.76 K/UL — HIGH (ref 4.8–10.8)
WBC # BLD: 12.92 K/UL — HIGH (ref 4.8–10.8)
WBC # BLD: 16.49 K/UL — HIGH (ref 4.8–10.8)
WBC # BLD: 16.86 K/UL — HIGH (ref 4.8–10.8)
WBC # BLD: 17.26 K/UL — HIGH (ref 4.8–10.8)
WBC # BLD: 17.74 K/UL — HIGH (ref 4.8–10.8)
WBC # BLD: 19.47 K/UL — HIGH (ref 4.8–10.8)
WBC # BLD: 19.96 K/UL — HIGH (ref 4.8–10.8)
WBC # BLD: 21.45 K/UL — HIGH (ref 4.8–10.8)
WBC # BLD: 21.53 K/UL — HIGH (ref 4.8–10.8)
WBC # BLD: 22.4 K/UL — HIGH (ref 4.8–10.8)
WBC # BLD: 23.02 K/UL — HIGH (ref 4.8–10.8)
WBC # BLD: 24.29 K/UL — HIGH (ref 4.8–10.8)
WBC # BLD: 24.71 K/UL — HIGH (ref 4.8–10.8)
WBC # BLD: 25.55 K/UL — HIGH (ref 4.8–10.8)
WBC # BLD: 26.1 K/UL — HIGH (ref 4.8–10.8)
WBC # BLD: 28.6 K/UL — HIGH (ref 4.8–10.8)
WBC # BLD: 4.89 K/UL — SIGNIFICANT CHANGE UP (ref 4.8–10.8)
WBC # BLD: 5.49 K/UL — SIGNIFICANT CHANGE UP (ref 4.8–10.8)
WBC # BLD: 5.78 K/UL — SIGNIFICANT CHANGE UP (ref 4.8–10.8)
WBC # BLD: 6.5 K/UL — SIGNIFICANT CHANGE UP (ref 4.8–10.8)
WBC # BLD: 6.98 K/UL — SIGNIFICANT CHANGE UP (ref 3.8–10.5)
WBC # BLD: 7.18 K/UL — SIGNIFICANT CHANGE UP (ref 4.8–10.8)
WBC # BLD: 7.81 K/UL — SIGNIFICANT CHANGE UP (ref 4.8–10.8)
WBC # FLD AUTO: 11.72 K/UL — HIGH (ref 4.8–10.8)
WBC # FLD AUTO: 11.76 K/UL — HIGH (ref 4.8–10.8)
WBC # FLD AUTO: 12.92 K/UL — HIGH (ref 4.8–10.8)
WBC # FLD AUTO: 16.49 K/UL — HIGH (ref 4.8–10.8)
WBC # FLD AUTO: 16.86 K/UL — HIGH (ref 4.8–10.8)
WBC # FLD AUTO: 17.26 K/UL — HIGH (ref 4.8–10.8)
WBC # FLD AUTO: 17.74 K/UL — HIGH (ref 4.8–10.8)
WBC # FLD AUTO: 19.47 K/UL — HIGH (ref 4.8–10.8)
WBC # FLD AUTO: 19.96 K/UL — HIGH (ref 4.8–10.8)
WBC # FLD AUTO: 21.45 K/UL — HIGH (ref 4.8–10.8)
WBC # FLD AUTO: 21.53 K/UL — HIGH (ref 4.8–10.8)
WBC # FLD AUTO: 22.4 K/UL — HIGH (ref 4.8–10.8)
WBC # FLD AUTO: 23.02 K/UL — HIGH (ref 4.8–10.8)
WBC # FLD AUTO: 24.29 K/UL — HIGH (ref 4.8–10.8)
WBC # FLD AUTO: 24.71 K/UL — HIGH (ref 4.8–10.8)
WBC # FLD AUTO: 25.55 K/UL — HIGH (ref 4.8–10.8)
WBC # FLD AUTO: 26.1 K/UL — HIGH (ref 4.8–10.8)
WBC # FLD AUTO: 28.6 K/UL — HIGH (ref 4.8–10.8)
WBC # FLD AUTO: 4.89 K/UL — SIGNIFICANT CHANGE UP (ref 4.8–10.8)
WBC # FLD AUTO: 5.49 K/UL — SIGNIFICANT CHANGE UP (ref 4.8–10.8)
WBC # FLD AUTO: 5.78 K/UL — SIGNIFICANT CHANGE UP (ref 4.8–10.8)
WBC # FLD AUTO: 6.5 K/UL — SIGNIFICANT CHANGE UP (ref 4.8–10.8)
WBC # FLD AUTO: 6.98 K/UL — SIGNIFICANT CHANGE UP (ref 3.8–10.5)
WBC # FLD AUTO: 7.18 K/UL — SIGNIFICANT CHANGE UP (ref 4.8–10.8)
WBC # FLD AUTO: 7.81 K/UL — SIGNIFICANT CHANGE UP (ref 4.8–10.8)
WBC UR QL: 18 /HPF — HIGH (ref 0–5)

## 2020-01-01 PROCEDURE — 71045 X-RAY EXAM CHEST 1 VIEW: CPT | Mod: 26

## 2020-01-01 PROCEDURE — 83880 ASSAY OF NATRIURETIC PEPTIDE: CPT

## 2020-01-01 PROCEDURE — 93010 ELECTROCARDIOGRAM REPORT: CPT | Mod: 59

## 2020-01-01 PROCEDURE — 99231 SBSQ HOSP IP/OBS SF/LOW 25: CPT

## 2020-01-01 PROCEDURE — 95822 EEG COMA OR SLEEP ONLY: CPT | Mod: 26

## 2020-01-01 PROCEDURE — 99221 1ST HOSP IP/OBS SF/LOW 40: CPT

## 2020-01-01 PROCEDURE — 70450 CT HEAD/BRAIN W/O DYE: CPT | Mod: 26

## 2020-01-01 PROCEDURE — 99285 EMERGENCY DEPT VISIT HI MDM: CPT

## 2020-01-01 PROCEDURE — 71045 X-RAY EXAM CHEST 1 VIEW: CPT | Mod: 26,77

## 2020-01-01 PROCEDURE — 80053 COMPREHEN METABOLIC PANEL: CPT

## 2020-01-01 PROCEDURE — 84484 ASSAY OF TROPONIN QUANT: CPT

## 2020-01-01 PROCEDURE — 99232 SBSQ HOSP IP/OBS MODERATE 35: CPT

## 2020-01-01 PROCEDURE — 99233 SBSQ HOSP IP/OBS HIGH 50: CPT

## 2020-01-01 PROCEDURE — 93010 ELECTROCARDIOGRAM REPORT: CPT

## 2020-01-01 PROCEDURE — 99497 ADVNCD CARE PLAN 30 MIN: CPT

## 2020-01-01 PROCEDURE — 99291 CRITICAL CARE FIRST HOUR: CPT

## 2020-01-01 PROCEDURE — 87040 BLOOD CULTURE FOR BACTERIA: CPT

## 2020-01-01 PROCEDURE — 87635 SARS-COV-2 COVID-19 AMP PRB: CPT

## 2020-01-01 PROCEDURE — 36415 COLL VENOUS BLD VENIPUNCTURE: CPT

## 2020-01-01 PROCEDURE — 99282 EMERGENCY DEPT VISIT SF MDM: CPT

## 2020-01-01 PROCEDURE — 83605 ASSAY OF LACTIC ACID: CPT

## 2020-01-01 PROCEDURE — 99222 1ST HOSP IP/OBS MODERATE 55: CPT

## 2020-01-01 PROCEDURE — 71045 X-RAY EXAM CHEST 1 VIEW: CPT

## 2020-01-01 PROCEDURE — 85027 COMPLETE CBC AUTOMATED: CPT

## 2020-01-01 PROCEDURE — 99285 EMERGENCY DEPT VISIT HI MDM: CPT | Mod: 25

## 2020-01-01 PROCEDURE — 99223 1ST HOSP IP/OBS HIGH 75: CPT | Mod: AI,GC

## 2020-01-01 RX ORDER — INSULIN LISPRO 100/ML
7 VIAL (ML) SUBCUTANEOUS
Refills: 0 | Status: DISCONTINUED | OUTPATIENT
Start: 2020-01-01 | End: 2020-01-01

## 2020-01-01 RX ORDER — DEXTROSE 50 % IN WATER 50 %
50 SYRINGE (ML) INTRAVENOUS ONCE
Refills: 0 | Status: DISCONTINUED | OUTPATIENT
Start: 2020-01-01 | End: 2020-01-01

## 2020-01-01 RX ORDER — SODIUM CHLORIDE 9 MG/ML
1000 INJECTION INTRAMUSCULAR; INTRAVENOUS; SUBCUTANEOUS ONCE
Refills: 0 | Status: DISCONTINUED | OUTPATIENT
Start: 2020-01-01 | End: 2020-01-01

## 2020-01-01 RX ORDER — CHLORHEXIDINE GLUCONATE 213 G/1000ML
15 SOLUTION TOPICAL EVERY 12 HOURS
Refills: 0 | Status: DISCONTINUED | OUTPATIENT
Start: 2020-01-01 | End: 2020-01-01

## 2020-01-01 RX ORDER — DEXTROSE 10 % IN WATER 10 %
250 INTRAVENOUS SOLUTION INTRAVENOUS ONCE
Refills: 0 | Status: COMPLETED | OUTPATIENT
Start: 2020-01-01 | End: 2020-01-01

## 2020-01-01 RX ORDER — INSULIN HUMAN 100 [IU]/ML
10 INJECTION, SOLUTION SUBCUTANEOUS ONCE
Refills: 0 | Status: COMPLETED | OUTPATIENT
Start: 2020-01-01 | End: 2020-01-01

## 2020-01-01 RX ORDER — LACTULOSE 10 G/15ML
20 SOLUTION ORAL ONCE
Refills: 0 | Status: COMPLETED | OUTPATIENT
Start: 2020-01-01 | End: 2020-01-01

## 2020-01-01 RX ORDER — ANAKINRA 100MG/0.67
100 SYRINGE (ML) SUBCUTANEOUS EVERY 6 HOURS
Refills: 0 | Status: COMPLETED | OUTPATIENT
Start: 2020-01-01 | End: 2020-01-01

## 2020-01-01 RX ORDER — HYDROXYCHLOROQUINE SULFATE 200 MG
200 TABLET ORAL EVERY 12 HOURS
Refills: 0 | Status: COMPLETED | OUTPATIENT
Start: 2020-01-01 | End: 2020-01-01

## 2020-01-01 RX ORDER — SODIUM CHLORIDE 9 MG/ML
500 INJECTION INTRAMUSCULAR; INTRAVENOUS; SUBCUTANEOUS ONCE
Refills: 0 | Status: COMPLETED | OUTPATIENT
Start: 2020-01-01 | End: 2020-01-01

## 2020-01-01 RX ORDER — SODIUM ZIRCONIUM CYCLOSILICATE 10 G/10G
10 POWDER, FOR SUSPENSION ORAL ONCE
Refills: 0 | Status: COMPLETED | OUTPATIENT
Start: 2020-01-01 | End: 2020-01-01

## 2020-01-01 RX ORDER — ENOXAPARIN SODIUM 100 MG/ML
40 INJECTION SUBCUTANEOUS AT BEDTIME
Refills: 0 | Status: DISCONTINUED | OUTPATIENT
Start: 2020-01-01 | End: 2020-01-01

## 2020-01-01 RX ORDER — DOBUTAMINE HCL 250MG/20ML
2.5 VIAL (ML) INTRAVENOUS
Qty: 500 | Refills: 0 | Status: DISCONTINUED | OUTPATIENT
Start: 2020-01-01 | End: 2020-01-01

## 2020-01-01 RX ORDER — MIDAZOLAM HYDROCHLORIDE 1 MG/ML
0.05 INJECTION, SOLUTION INTRAMUSCULAR; INTRAVENOUS
Qty: 100 | Refills: 0 | Status: DISCONTINUED | OUTPATIENT
Start: 2020-01-01 | End: 2020-01-01

## 2020-01-01 RX ORDER — INSULIN LISPRO 100/ML
VIAL (ML) SUBCUTANEOUS
Refills: 0 | Status: DISCONTINUED | OUTPATIENT
Start: 2020-01-01 | End: 2020-01-01

## 2020-01-01 RX ORDER — GLUCAGON INJECTION, SOLUTION 0.5 MG/.1ML
1 INJECTION, SOLUTION SUBCUTANEOUS ONCE
Refills: 0 | Status: DISCONTINUED | OUTPATIENT
Start: 2020-01-01 | End: 2020-01-01

## 2020-01-01 RX ORDER — SODIUM CHLORIDE 9 MG/ML
1000 INJECTION INTRAMUSCULAR; INTRAVENOUS; SUBCUTANEOUS
Refills: 0 | Status: DISCONTINUED | OUTPATIENT
Start: 2020-01-01 | End: 2020-01-01

## 2020-01-01 RX ORDER — NOREPINEPHRINE BITARTRATE/D5W 8 MG/250ML
0.05 PLASTIC BAG, INJECTION (ML) INTRAVENOUS
Qty: 8 | Refills: 0 | Status: DISCONTINUED | OUTPATIENT
Start: 2020-01-01 | End: 2020-01-01

## 2020-01-01 RX ORDER — CEFTRIAXONE 500 MG/1
1000 INJECTION, POWDER, FOR SOLUTION INTRAMUSCULAR; INTRAVENOUS EVERY 24 HOURS
Refills: 0 | Status: DISCONTINUED | OUTPATIENT
Start: 2020-01-01 | End: 2020-01-01

## 2020-01-01 RX ORDER — SODIUM POLYSTYRENE SULFONATE 4.1 MEQ/G
15 POWDER, FOR SUSPENSION ORAL ONCE
Refills: 0 | Status: COMPLETED | OUTPATIENT
Start: 2020-01-01 | End: 2020-01-01

## 2020-01-01 RX ORDER — AZITHROMYCIN 500 MG/1
250 TABLET, FILM COATED ORAL DAILY
Refills: 0 | Status: COMPLETED | OUTPATIENT
Start: 2020-01-01 | End: 2020-01-01

## 2020-01-01 RX ORDER — PANTOPRAZOLE SODIUM 20 MG/1
40 TABLET, DELAYED RELEASE ORAL
Refills: 0 | Status: DISCONTINUED | OUTPATIENT
Start: 2020-01-01 | End: 2020-01-01

## 2020-01-01 RX ORDER — SODIUM POLYSTYRENE SULFONATE 4.1 MEQ/G
30 POWDER, FOR SUSPENSION ORAL ONCE
Refills: 0 | Status: COMPLETED | OUTPATIENT
Start: 2020-01-01 | End: 2020-01-01

## 2020-01-01 RX ORDER — DEXMEDETOMIDINE HYDROCHLORIDE IN 0.9% SODIUM CHLORIDE 4 UG/ML
0.2 INJECTION INTRAVENOUS
Qty: 200 | Refills: 0 | Status: DISCONTINUED | OUTPATIENT
Start: 2020-01-01 | End: 2020-01-01

## 2020-01-01 RX ORDER — CEFTRIAXONE 500 MG/1
2000 INJECTION, POWDER, FOR SOLUTION INTRAMUSCULAR; INTRAVENOUS EVERY 24 HOURS
Refills: 0 | Status: COMPLETED | OUTPATIENT
Start: 2020-01-01 | End: 2020-01-01

## 2020-01-01 RX ORDER — SODIUM CHLORIDE 9 MG/ML
1000 INJECTION, SOLUTION INTRAVENOUS
Refills: 0 | Status: DISCONTINUED | OUTPATIENT
Start: 2020-01-01 | End: 2020-01-01

## 2020-01-01 RX ORDER — LABETALOL HCL 100 MG
10 TABLET ORAL ONCE
Refills: 0 | Status: COMPLETED | OUTPATIENT
Start: 2020-01-01 | End: 2020-01-01

## 2020-01-01 RX ORDER — HYDROXYCHLOROQUINE SULFATE 200 MG
TABLET ORAL
Refills: 0 | Status: COMPLETED | OUTPATIENT
Start: 2020-01-01 | End: 2020-01-01

## 2020-01-01 RX ORDER — MORPHINE SULFATE 50 MG/1
2 CAPSULE, EXTENDED RELEASE ORAL
Qty: 100 | Refills: 0 | Status: DISCONTINUED | OUTPATIENT
Start: 2020-01-01 | End: 2020-01-01

## 2020-01-01 RX ORDER — VANCOMYCIN HCL 1 G
1000 VIAL (EA) INTRAVENOUS EVERY 24 HOURS
Refills: 0 | Status: DISCONTINUED | OUTPATIENT
Start: 2020-01-01 | End: 2020-01-01

## 2020-01-01 RX ORDER — DEXTROSE 50 % IN WATER 50 %
25 SYRINGE (ML) INTRAVENOUS ONCE
Refills: 0 | Status: DISCONTINUED | OUTPATIENT
Start: 2020-01-01 | End: 2020-01-01

## 2020-01-01 RX ORDER — DEXTROSE 50 % IN WATER 50 %
50 SYRINGE (ML) INTRAVENOUS ONCE
Refills: 0 | Status: COMPLETED | OUTPATIENT
Start: 2020-01-01 | End: 2020-01-01

## 2020-01-01 RX ORDER — ACETAMINOPHEN 500 MG
650 TABLET ORAL EVERY 6 HOURS
Refills: 0 | Status: DISCONTINUED | OUTPATIENT
Start: 2020-01-01 | End: 2020-01-01

## 2020-01-01 RX ORDER — SODIUM CHLORIDE 9 MG/ML
250 INJECTION INTRAMUSCULAR; INTRAVENOUS; SUBCUTANEOUS ONCE
Refills: 0 | Status: COMPLETED | OUTPATIENT
Start: 2020-01-01 | End: 2020-01-01

## 2020-01-01 RX ORDER — NOREPINEPHRINE BITARTRATE/D5W 8 MG/250ML
1.5 PLASTIC BAG, INJECTION (ML) INTRAVENOUS
Qty: 16 | Refills: 0 | Status: DISCONTINUED | OUTPATIENT
Start: 2020-01-01 | End: 2020-01-01

## 2020-01-01 RX ORDER — ENOXAPARIN SODIUM 100 MG/ML
40 INJECTION SUBCUTANEOUS DAILY
Refills: 0 | Status: DISCONTINUED | OUTPATIENT
Start: 2020-01-01 | End: 2020-01-01

## 2020-01-01 RX ORDER — ANAKINRA 100MG/0.67
100 SYRINGE (ML) SUBCUTANEOUS
Refills: 0 | Status: DISCONTINUED | OUTPATIENT
Start: 2020-01-01 | End: 2020-01-01

## 2020-01-01 RX ORDER — INSULIN HUMAN 100 [IU]/ML
10 INJECTION, SOLUTION SUBCUTANEOUS ONCE
Refills: 0 | Status: DISCONTINUED | OUTPATIENT
Start: 2020-01-01 | End: 2020-01-01

## 2020-01-01 RX ORDER — DEXTROSE 50 % IN WATER 50 %
12.5 SYRINGE (ML) INTRAVENOUS ONCE
Refills: 0 | Status: DISCONTINUED | OUTPATIENT
Start: 2020-01-01 | End: 2020-01-01

## 2020-01-01 RX ORDER — HYDROXYCHLOROQUINE SULFATE 200 MG
400 TABLET ORAL EVERY 12 HOURS
Refills: 0 | Status: COMPLETED | OUTPATIENT
Start: 2020-01-01 | End: 2020-01-01

## 2020-01-01 RX ORDER — INFLUENZA VIRUS VACCINE 15; 15; 15; 15 UG/.5ML; UG/.5ML; UG/.5ML; UG/.5ML
0.5 SUSPENSION INTRAMUSCULAR ONCE
Refills: 0 | Status: DISCONTINUED | OUTPATIENT
Start: 2020-01-01 | End: 2020-01-01

## 2020-01-01 RX ORDER — CEFTRIAXONE 500 MG/1
2000 INJECTION, POWDER, FOR SOLUTION INTRAMUSCULAR; INTRAVENOUS EVERY 24 HOURS
Refills: 0 | Status: DISCONTINUED | OUTPATIENT
Start: 2020-01-01 | End: 2020-01-01

## 2020-01-01 RX ORDER — SODIUM CHLORIDE 9 MG/ML
1000 INJECTION INTRAMUSCULAR; INTRAVENOUS; SUBCUTANEOUS ONCE
Refills: 0 | Status: COMPLETED | OUTPATIENT
Start: 2020-01-01 | End: 2020-01-01

## 2020-01-01 RX ORDER — INSULIN GLARGINE 100 [IU]/ML
20 INJECTION, SOLUTION SUBCUTANEOUS AT BEDTIME
Refills: 0 | Status: DISCONTINUED | OUTPATIENT
Start: 2020-01-01 | End: 2020-01-01

## 2020-01-01 RX ORDER — DEXTROSE 10 % IN WATER 10 %
250 INTRAVENOUS SOLUTION INTRAVENOUS
Refills: 0 | Status: DISCONTINUED | OUTPATIENT
Start: 2020-01-01 | End: 2020-01-01

## 2020-01-01 RX ORDER — INSULIN HUMAN 100 [IU]/ML
2 INJECTION, SOLUTION SUBCUTANEOUS
Qty: 100 | Refills: 0 | Status: DISCONTINUED | OUTPATIENT
Start: 2020-01-01 | End: 2020-01-01

## 2020-01-01 RX ORDER — MORPHINE SULFATE 50 MG/1
4 CAPSULE, EXTENDED RELEASE ORAL ONCE
Refills: 0 | Status: DISCONTINUED | OUTPATIENT
Start: 2020-01-01 | End: 2020-01-01

## 2020-01-01 RX ORDER — CHLORHEXIDINE GLUCONATE 213 G/1000ML
1 SOLUTION TOPICAL
Refills: 0 | Status: DISCONTINUED | OUTPATIENT
Start: 2020-01-01 | End: 2020-01-01

## 2020-01-01 RX ORDER — INSULIN GLARGINE 100 [IU]/ML
30 INJECTION, SOLUTION SUBCUTANEOUS AT BEDTIME
Refills: 0 | Status: DISCONTINUED | OUTPATIENT
Start: 2020-01-01 | End: 2020-01-01

## 2020-01-01 RX ORDER — AZITHROMYCIN 500 MG/1
250 TABLET, FILM COATED ORAL DAILY
Refills: 0 | Status: DISCONTINUED | OUTPATIENT
Start: 2020-01-01 | End: 2020-01-01

## 2020-01-01 RX ORDER — DEXTROSE 10 % IN WATER 10 %
250 INTRAVENOUS SOLUTION INTRAVENOUS
Refills: 0 | Status: COMPLETED | OUTPATIENT
Start: 2020-01-01 | End: 2020-01-01

## 2020-01-01 RX ORDER — CALCIUM GLUCONATE 100 MG/ML
1 VIAL (ML) INTRAVENOUS ONCE
Refills: 0 | Status: COMPLETED | OUTPATIENT
Start: 2020-01-01 | End: 2020-01-01

## 2020-01-01 RX ORDER — HEPARIN SODIUM 5000 [USP'U]/ML
5000 INJECTION INTRAVENOUS; SUBCUTANEOUS EVERY 8 HOURS
Refills: 0 | Status: DISCONTINUED | OUTPATIENT
Start: 2020-01-01 | End: 2020-01-01

## 2020-01-01 RX ORDER — MORPHINE SULFATE 50 MG/1
2 CAPSULE, EXTENDED RELEASE ORAL ONCE
Refills: 0 | Status: DISCONTINUED | OUTPATIENT
Start: 2020-01-01 | End: 2020-01-01

## 2020-01-01 RX ORDER — AZITHROMYCIN 500 MG/1
500 TABLET, FILM COATED ORAL ONCE
Refills: 0 | Status: COMPLETED | OUTPATIENT
Start: 2020-01-01 | End: 2020-01-01

## 2020-01-01 RX ORDER — SODIUM ZIRCONIUM CYCLOSILICATE 10 G/10G
10 POWDER, FOR SUSPENSION ORAL
Refills: 0 | Status: DISCONTINUED | OUTPATIENT
Start: 2020-01-01 | End: 2020-01-01

## 2020-01-01 RX ORDER — DEXTROSE 50 % IN WATER 50 %
15 SYRINGE (ML) INTRAVENOUS ONCE
Refills: 0 | Status: DISCONTINUED | OUTPATIENT
Start: 2020-01-01 | End: 2020-01-01

## 2020-01-01 RX ORDER — INSULIN LISPRO 100/ML
10 VIAL (ML) SUBCUTANEOUS EVERY 6 HOURS
Refills: 0 | Status: DISCONTINUED | OUTPATIENT
Start: 2020-01-01 | End: 2020-01-01

## 2020-01-01 RX ORDER — CHOLECALCIFEROL (VITAMIN D3) 125 MCG
1 CAPSULE ORAL
Qty: 0 | Refills: 0 | DISCHARGE

## 2020-01-01 RX ORDER — MIDAZOLAM HYDROCHLORIDE 1 MG/ML
3 INJECTION, SOLUTION INTRAMUSCULAR; INTRAVENOUS ONCE
Refills: 0 | Status: DISCONTINUED | OUTPATIENT
Start: 2020-01-01 | End: 2020-01-01

## 2020-01-01 RX ORDER — VANCOMYCIN HCL 1 G
750 VIAL (EA) INTRAVENOUS EVERY 24 HOURS
Refills: 0 | Status: DISCONTINUED | OUTPATIENT
Start: 2020-01-01 | End: 2020-01-01

## 2020-01-01 RX ORDER — ACETAMINOPHEN 500 MG
975 TABLET ORAL ONCE
Refills: 0 | Status: COMPLETED | OUTPATIENT
Start: 2020-01-01 | End: 2020-01-01

## 2020-01-01 RX ORDER — ALPRAZOLAM 0.25 MG
1 TABLET ORAL
Refills: 0 | Status: DISCONTINUED | OUTPATIENT
Start: 2020-01-01 | End: 2020-01-01

## 2020-01-01 RX ORDER — CEFTRIAXONE 500 MG/1
1000 INJECTION, POWDER, FOR SOLUTION INTRAMUSCULAR; INTRAVENOUS ONCE
Refills: 0 | Status: COMPLETED | OUTPATIENT
Start: 2020-01-01 | End: 2020-01-01

## 2020-01-01 RX ORDER — PROPOFOL 10 MG/ML
5 INJECTION, EMULSION INTRAVENOUS
Qty: 500 | Refills: 0 | Status: DISCONTINUED | OUTPATIENT
Start: 2020-01-01 | End: 2020-01-01

## 2020-01-01 RX ORDER — CALCIUM GLUCONATE 100 MG/ML
2 VIAL (ML) INTRAVENOUS ONCE
Refills: 0 | Status: COMPLETED | OUTPATIENT
Start: 2020-01-01 | End: 2020-01-01

## 2020-01-01 RX ORDER — QUETIAPINE FUMARATE 200 MG/1
50 TABLET, FILM COATED ORAL DAILY
Refills: 0 | Status: DISCONTINUED | OUTPATIENT
Start: 2020-01-01 | End: 2020-01-01

## 2020-01-01 RX ORDER — SODIUM ZIRCONIUM CYCLOSILICATE 10 G/10G
10 POWDER, FOR SUSPENSION ORAL EVERY 12 HOURS
Refills: 0 | Status: DISCONTINUED | OUTPATIENT
Start: 2020-01-01 | End: 2020-01-01

## 2020-01-01 RX ADMIN — HEPARIN SODIUM 5000 UNIT(S): 5000 INJECTION INTRAVENOUS; SUBCUTANEOUS at 21:11

## 2020-01-01 RX ADMIN — Medication 100 MILLIGRAM(S): at 05:12

## 2020-01-01 RX ADMIN — Medication 200 MILLIGRAM(S): at 21:37

## 2020-01-01 RX ADMIN — INSULIN HUMAN 2 UNIT(S)/HR: 100 INJECTION, SOLUTION SUBCUTANEOUS at 08:00

## 2020-01-01 RX ADMIN — QUETIAPINE FUMARATE 50 MILLIGRAM(S): 200 TABLET, FILM COATED ORAL at 16:01

## 2020-01-01 RX ADMIN — HEPARIN SODIUM 5000 UNIT(S): 5000 INJECTION INTRAVENOUS; SUBCUTANEOUS at 04:57

## 2020-01-01 RX ADMIN — CHLORHEXIDINE GLUCONATE 15 MILLILITER(S): 213 SOLUTION TOPICAL at 18:56

## 2020-01-01 RX ADMIN — HEPARIN SODIUM 5000 UNIT(S): 5000 INJECTION INTRAVENOUS; SUBCUTANEOUS at 12:32

## 2020-01-01 RX ADMIN — CHLORHEXIDINE GLUCONATE 15 MILLILITER(S): 213 SOLUTION TOPICAL at 17:14

## 2020-01-01 RX ADMIN — Medication 60 MILLIGRAM(S): at 18:31

## 2020-01-01 RX ADMIN — Medication 60 MILLIGRAM(S): at 04:22

## 2020-01-01 RX ADMIN — Medication 100 GRAM(S): at 18:26

## 2020-01-01 RX ADMIN — HEPARIN SODIUM 5000 UNIT(S): 5000 INJECTION INTRAVENOUS; SUBCUTANEOUS at 14:21

## 2020-01-01 RX ADMIN — Medication 400 MILLIGRAM(S): at 13:27

## 2020-01-01 RX ADMIN — SODIUM CHLORIDE 75 MILLILITER(S): 9 INJECTION, SOLUTION INTRAVENOUS at 01:07

## 2020-01-01 RX ADMIN — HEPARIN SODIUM 5000 UNIT(S): 5000 INJECTION INTRAVENOUS; SUBCUTANEOUS at 13:57

## 2020-01-01 RX ADMIN — SODIUM ZIRCONIUM CYCLOSILICATE 10 GRAM(S): 10 POWDER, FOR SUSPENSION ORAL at 04:21

## 2020-01-01 RX ADMIN — HEPARIN SODIUM 5000 UNIT(S): 5000 INJECTION INTRAVENOUS; SUBCUTANEOUS at 23:32

## 2020-01-01 RX ADMIN — CHLORHEXIDINE GLUCONATE 15 MILLILITER(S): 213 SOLUTION TOPICAL at 17:28

## 2020-01-01 RX ADMIN — LACTULOSE 20 GRAM(S): 10 SOLUTION ORAL at 04:18

## 2020-01-01 RX ADMIN — PROPOFOL 2.26 MICROGRAM(S)/KG/MIN: 10 INJECTION, EMULSION INTRAVENOUS at 19:53

## 2020-01-01 RX ADMIN — INSULIN HUMAN 10 UNIT(S): 100 INJECTION, SOLUTION SUBCUTANEOUS at 10:00

## 2020-01-01 RX ADMIN — CHLORHEXIDINE GLUCONATE 15 MILLILITER(S): 213 SOLUTION TOPICAL at 18:31

## 2020-01-01 RX ADMIN — Medication 10 UNIT(S): at 18:26

## 2020-01-01 RX ADMIN — Medication 2 MILLIGRAM(S): at 18:45

## 2020-01-01 RX ADMIN — SODIUM CHLORIDE 75 MILLILITER(S): 9 INJECTION, SOLUTION INTRAVENOUS at 11:13

## 2020-01-01 RX ADMIN — HEPARIN SODIUM 5000 UNIT(S): 5000 INJECTION INTRAVENOUS; SUBCUTANEOUS at 22:03

## 2020-01-01 RX ADMIN — ENOXAPARIN SODIUM 40 MILLIGRAM(S): 100 INJECTION SUBCUTANEOUS at 21:34

## 2020-01-01 RX ADMIN — Medication 60 MILLIGRAM(S): at 05:06

## 2020-01-01 RX ADMIN — INSULIN GLARGINE 30 UNIT(S): 100 INJECTION, SOLUTION SUBCUTANEOUS at 22:05

## 2020-01-01 RX ADMIN — SODIUM CHLORIDE 75 MILLILITER(S): 9 INJECTION, SOLUTION INTRAVENOUS at 10:05

## 2020-01-01 RX ADMIN — Medication 60 MILLIGRAM(S): at 05:17

## 2020-01-01 RX ADMIN — Medication 60 MILLIGRAM(S): at 07:53

## 2020-01-01 RX ADMIN — QUETIAPINE FUMARATE 50 MILLIGRAM(S): 200 TABLET, FILM COATED ORAL at 10:08

## 2020-01-01 RX ADMIN — Medication 650 MILLIGRAM(S): at 08:51

## 2020-01-01 RX ADMIN — Medication 10 MILLIGRAM(S): at 15:59

## 2020-01-01 RX ADMIN — AZITHROMYCIN 250 MILLIGRAM(S): 500 TABLET, FILM COATED ORAL at 13:21

## 2020-01-01 RX ADMIN — SODIUM ZIRCONIUM CYCLOSILICATE 10 GRAM(S): 10 POWDER, FOR SUSPENSION ORAL at 07:53

## 2020-01-01 RX ADMIN — Medication 10 UNIT(S): at 21:21

## 2020-01-01 RX ADMIN — SODIUM POLYSTYRENE SULFONATE 30 GRAM(S): 4.1 POWDER, FOR SUSPENSION ORAL at 21:10

## 2020-01-01 RX ADMIN — SODIUM POLYSTYRENE SULFONATE 15 GRAM(S): 4.1 POWDER, FOR SUSPENSION ORAL at 12:34

## 2020-01-01 RX ADMIN — Medication 60 MILLIGRAM(S): at 17:59

## 2020-01-01 RX ADMIN — CHLORHEXIDINE GLUCONATE 15 MILLILITER(S): 213 SOLUTION TOPICAL at 05:30

## 2020-01-01 RX ADMIN — Medication 60 MILLIGRAM(S): at 18:56

## 2020-01-01 RX ADMIN — DEXMEDETOMIDINE HYDROCHLORIDE IN 0.9% SODIUM CHLORIDE 3.77 MICROGRAM(S)/KG/HR: 4 INJECTION INTRAVENOUS at 06:06

## 2020-01-01 RX ADMIN — HEPARIN SODIUM 5000 UNIT(S): 5000 INJECTION INTRAVENOUS; SUBCUTANEOUS at 22:30

## 2020-01-01 RX ADMIN — CHLORHEXIDINE GLUCONATE 1 APPLICATION(S): 213 SOLUTION TOPICAL at 05:04

## 2020-01-01 RX ADMIN — INSULIN HUMAN 2 UNIT(S)/HR: 100 INJECTION, SOLUTION SUBCUTANEOUS at 23:03

## 2020-01-01 RX ADMIN — SODIUM CHLORIDE 60 MILLILITER(S): 9 INJECTION, SOLUTION INTRAVENOUS at 10:00

## 2020-01-01 RX ADMIN — INSULIN HUMAN 2 UNIT(S)/HR: 100 INJECTION, SOLUTION SUBCUTANEOUS at 06:53

## 2020-01-01 RX ADMIN — Medication 975 MILLIGRAM(S): at 10:56

## 2020-01-01 RX ADMIN — Medication 1500 MILLILITER(S): at 02:53

## 2020-01-01 RX ADMIN — HEPARIN SODIUM 5000 UNIT(S): 5000 INJECTION INTRAVENOUS; SUBCUTANEOUS at 13:28

## 2020-01-01 RX ADMIN — HEPARIN SODIUM 5000 UNIT(S): 5000 INJECTION INTRAVENOUS; SUBCUTANEOUS at 22:24

## 2020-01-01 RX ADMIN — SODIUM ZIRCONIUM CYCLOSILICATE 10 GRAM(S): 10 POWDER, FOR SUSPENSION ORAL at 13:49

## 2020-01-01 RX ADMIN — SODIUM ZIRCONIUM CYCLOSILICATE 10 GRAM(S): 10 POWDER, FOR SUSPENSION ORAL at 17:54

## 2020-01-01 RX ADMIN — MORPHINE SULFATE 2 MILLIGRAM(S): 50 CAPSULE, EXTENDED RELEASE ORAL at 23:15

## 2020-01-01 RX ADMIN — SODIUM CHLORIDE 75 MILLILITER(S): 9 INJECTION, SOLUTION INTRAVENOUS at 19:24

## 2020-01-01 RX ADMIN — Medication 12: at 13:18

## 2020-01-01 RX ADMIN — CHLORHEXIDINE GLUCONATE 15 MILLILITER(S): 213 SOLUTION TOPICAL at 17:41

## 2020-01-01 RX ADMIN — HEPARIN SODIUM 5000 UNIT(S): 5000 INJECTION INTRAVENOUS; SUBCUTANEOUS at 13:44

## 2020-01-01 RX ADMIN — INSULIN HUMAN 2 UNIT(S)/HR: 100 INJECTION, SOLUTION SUBCUTANEOUS at 10:10

## 2020-01-01 RX ADMIN — AZITHROMYCIN 250 MILLIGRAM(S): 500 TABLET, FILM COATED ORAL at 11:13

## 2020-01-01 RX ADMIN — CEFTRIAXONE 100 MILLIGRAM(S): 500 INJECTION, POWDER, FOR SOLUTION INTRAMUSCULAR; INTRAVENOUS at 13:44

## 2020-01-01 RX ADMIN — CHLORHEXIDINE GLUCONATE 1 APPLICATION(S): 213 SOLUTION TOPICAL at 04:57

## 2020-01-01 RX ADMIN — HEPARIN SODIUM 5000 UNIT(S): 5000 INJECTION INTRAVENOUS; SUBCUTANEOUS at 06:45

## 2020-01-01 RX ADMIN — Medication 650 MILLIGRAM(S): at 22:08

## 2020-01-01 RX ADMIN — SODIUM CHLORIDE 100 MILLILITER(S): 9 INJECTION, SOLUTION INTRAVENOUS at 11:04

## 2020-01-01 RX ADMIN — HEPARIN SODIUM 5000 UNIT(S): 5000 INJECTION INTRAVENOUS; SUBCUTANEOUS at 16:01

## 2020-01-01 RX ADMIN — Medication 7 UNIT(S): at 17:28

## 2020-01-01 RX ADMIN — SODIUM CHLORIDE 70 MILLILITER(S): 9 INJECTION, SOLUTION INTRAVENOUS at 09:56

## 2020-01-01 RX ADMIN — HEPARIN SODIUM 5000 UNIT(S): 5000 INJECTION INTRAVENOUS; SUBCUTANEOUS at 21:37

## 2020-01-01 RX ADMIN — CHLORHEXIDINE GLUCONATE 1 APPLICATION(S): 213 SOLUTION TOPICAL at 04:17

## 2020-01-01 RX ADMIN — INSULIN HUMAN 10 UNIT(S): 100 INJECTION, SOLUTION SUBCUTANEOUS at 16:58

## 2020-01-01 RX ADMIN — HEPARIN SODIUM 5000 UNIT(S): 5000 INJECTION INTRAVENOUS; SUBCUTANEOUS at 22:00

## 2020-01-01 RX ADMIN — Medication 60 MILLIGRAM(S): at 18:25

## 2020-01-01 RX ADMIN — Medication 10 MILLIGRAM(S): at 09:20

## 2020-01-01 RX ADMIN — INSULIN HUMAN 10 UNIT(S): 100 INJECTION, SOLUTION SUBCUTANEOUS at 21:07

## 2020-01-01 RX ADMIN — Medication 10 UNIT(S): at 13:30

## 2020-01-01 RX ADMIN — Medication 60 MILLIGRAM(S): at 06:54

## 2020-01-01 RX ADMIN — HEPARIN SODIUM 5000 UNIT(S): 5000 INJECTION INTRAVENOUS; SUBCUTANEOUS at 22:58

## 2020-01-01 RX ADMIN — SODIUM POLYSTYRENE SULFONATE 15 GRAM(S): 4.1 POWDER, FOR SUSPENSION ORAL at 14:29

## 2020-01-01 RX ADMIN — Medication 10 MILLIGRAM(S): at 17:15

## 2020-01-01 RX ADMIN — SODIUM POLYSTYRENE SULFONATE 30 GRAM(S): 4.1 POWDER, FOR SUSPENSION ORAL at 16:57

## 2020-01-01 RX ADMIN — SODIUM CHLORIDE 100 MILLILITER(S): 9 INJECTION, SOLUTION INTRAVENOUS at 22:56

## 2020-01-01 RX ADMIN — HEPARIN SODIUM 5000 UNIT(S): 5000 INJECTION INTRAVENOUS; SUBCUTANEOUS at 06:53

## 2020-01-01 RX ADMIN — SODIUM CHLORIDE 125 MILLILITER(S): 9 INJECTION, SOLUTION INTRAVENOUS at 18:58

## 2020-01-01 RX ADMIN — Medication 10 UNIT(S): at 17:41

## 2020-01-01 RX ADMIN — INSULIN HUMAN 2 UNIT(S)/HR: 100 INJECTION, SOLUTION SUBCUTANEOUS at 09:43

## 2020-01-01 RX ADMIN — CHLORHEXIDINE GLUCONATE 1 APPLICATION(S): 213 SOLUTION TOPICAL at 05:58

## 2020-01-01 RX ADMIN — Medication 650 MILLIGRAM(S): at 20:37

## 2020-01-01 RX ADMIN — MIDAZOLAM HYDROCHLORIDE 3.77 MG/KG/HR: 1 INJECTION, SOLUTION INTRAMUSCULAR; INTRAVENOUS at 19:40

## 2020-01-01 RX ADMIN — Medication 4: at 06:26

## 2020-01-01 RX ADMIN — Medication 4 MILLIGRAM(S): at 18:39

## 2020-01-01 RX ADMIN — QUETIAPINE FUMARATE 50 MILLIGRAM(S): 200 TABLET, FILM COATED ORAL at 12:34

## 2020-01-01 RX ADMIN — Medication 10 UNIT(S): at 08:12

## 2020-01-01 RX ADMIN — Medication 60 MILLIGRAM(S): at 17:40

## 2020-01-01 RX ADMIN — INSULIN HUMAN 10 UNIT(S): 100 INJECTION, SOLUTION SUBCUTANEOUS at 14:56

## 2020-01-01 RX ADMIN — Medication 60 MILLIGRAM(S): at 18:05

## 2020-01-01 RX ADMIN — Medication 10 MILLIGRAM(S): at 16:58

## 2020-01-01 RX ADMIN — HEPARIN SODIUM 5000 UNIT(S): 5000 INJECTION INTRAVENOUS; SUBCUTANEOUS at 05:43

## 2020-01-01 RX ADMIN — CHLORHEXIDINE GLUCONATE 1 APPLICATION(S): 213 SOLUTION TOPICAL at 05:14

## 2020-01-01 RX ADMIN — INSULIN HUMAN 10 UNIT(S): 100 INJECTION, SOLUTION SUBCUTANEOUS at 03:11

## 2020-01-01 RX ADMIN — CEFTRIAXONE 100 MILLIGRAM(S): 500 INJECTION, POWDER, FOR SOLUTION INTRAMUSCULAR; INTRAVENOUS at 15:40

## 2020-01-01 RX ADMIN — CHLORHEXIDINE GLUCONATE 1 APPLICATION(S): 213 SOLUTION TOPICAL at 05:34

## 2020-01-01 RX ADMIN — CEFTRIAXONE 100 MILLIGRAM(S): 500 INJECTION, POWDER, FOR SOLUTION INTRAMUSCULAR; INTRAVENOUS at 14:21

## 2020-01-01 RX ADMIN — CHLORHEXIDINE GLUCONATE 1 APPLICATION(S): 213 SOLUTION TOPICAL at 06:25

## 2020-01-01 RX ADMIN — Medication 650 MILLIGRAM(S): at 11:37

## 2020-01-01 RX ADMIN — Medication 50 MILLILITER(S): at 16:59

## 2020-01-01 RX ADMIN — SODIUM ZIRCONIUM CYCLOSILICATE 10 GRAM(S): 10 POWDER, FOR SUSPENSION ORAL at 05:17

## 2020-01-01 RX ADMIN — SODIUM CHLORIDE 70 MILLILITER(S): 9 INJECTION, SOLUTION INTRAVENOUS at 09:43

## 2020-01-01 RX ADMIN — CEFTRIAXONE 100 MILLIGRAM(S): 500 INJECTION, POWDER, FOR SOLUTION INTRAMUSCULAR; INTRAVENOUS at 13:33

## 2020-01-01 RX ADMIN — INSULIN HUMAN 2 UNIT(S)/HR: 100 INJECTION, SOLUTION SUBCUTANEOUS at 22:20

## 2020-01-01 RX ADMIN — Medication 100 MILLIGRAM(S): at 05:06

## 2020-01-01 RX ADMIN — HEPARIN SODIUM 5000 UNIT(S): 5000 INJECTION INTRAVENOUS; SUBCUTANEOUS at 05:34

## 2020-01-01 RX ADMIN — Medication 250 MILLILITER(S): at 14:21

## 2020-01-01 RX ADMIN — CHLORHEXIDINE GLUCONATE 15 MILLILITER(S): 213 SOLUTION TOPICAL at 17:09

## 2020-01-01 RX ADMIN — INSULIN GLARGINE 30 UNIT(S): 100 INJECTION, SOLUTION SUBCUTANEOUS at 21:22

## 2020-01-01 RX ADMIN — CHLORHEXIDINE GLUCONATE 1 APPLICATION(S): 213 SOLUTION TOPICAL at 06:43

## 2020-01-01 RX ADMIN — CHLORHEXIDINE GLUCONATE 15 MILLILITER(S): 213 SOLUTION TOPICAL at 06:16

## 2020-01-01 RX ADMIN — INSULIN HUMAN 10 UNIT(S): 100 INJECTION, SOLUTION SUBCUTANEOUS at 18:27

## 2020-01-01 RX ADMIN — Medication 650 MILLIGRAM(S): at 07:00

## 2020-01-01 RX ADMIN — INSULIN GLARGINE 30 UNIT(S): 100 INJECTION, SOLUTION SUBCUTANEOUS at 23:00

## 2020-01-01 RX ADMIN — Medication 250 MILLIGRAM(S): at 13:33

## 2020-01-01 RX ADMIN — Medication 60 MILLIGRAM(S): at 05:30

## 2020-01-01 RX ADMIN — SODIUM ZIRCONIUM CYCLOSILICATE 10 GRAM(S): 10 POWDER, FOR SUSPENSION ORAL at 21:13

## 2020-01-01 RX ADMIN — Medication 4: at 14:14

## 2020-01-01 RX ADMIN — CEFTRIAXONE 100 MILLIGRAM(S): 500 INJECTION, POWDER, FOR SOLUTION INTRAMUSCULAR; INTRAVENOUS at 13:27

## 2020-01-01 RX ADMIN — Medication 200 MILLIGRAM(S): at 13:21

## 2020-01-01 RX ADMIN — HEPARIN SODIUM 5000 UNIT(S): 5000 INJECTION INTRAVENOUS; SUBCUTANEOUS at 22:45

## 2020-01-01 RX ADMIN — SODIUM CHLORIDE 1000 MILLILITER(S): 9 INJECTION INTRAMUSCULAR; INTRAVENOUS; SUBCUTANEOUS at 10:56

## 2020-01-01 RX ADMIN — Medication 650 MILLIGRAM(S): at 17:52

## 2020-01-01 RX ADMIN — Medication 200 GRAM(S): at 02:52

## 2020-01-01 RX ADMIN — Medication 10 MILLIGRAM(S): at 23:42

## 2020-01-01 RX ADMIN — SODIUM CHLORIDE 1000 MILLILITER(S): 9 INJECTION INTRAMUSCULAR; INTRAVENOUS; SUBCUTANEOUS at 19:13

## 2020-01-01 RX ADMIN — DEXMEDETOMIDINE HYDROCHLORIDE IN 0.9% SODIUM CHLORIDE 3.77 MICROGRAM(S)/KG/HR: 4 INJECTION INTRAVENOUS at 09:30

## 2020-01-01 RX ADMIN — PROPOFOL 2.26 MICROGRAM(S)/KG/MIN: 10 INJECTION, EMULSION INTRAVENOUS at 05:06

## 2020-01-01 RX ADMIN — Medication 60 MILLIGRAM(S): at 04:57

## 2020-01-01 RX ADMIN — Medication 60 MILLIGRAM(S): at 06:45

## 2020-01-01 RX ADMIN — Medication 200 MILLIGRAM(S): at 14:29

## 2020-01-01 RX ADMIN — Medication 12: at 17:27

## 2020-01-01 RX ADMIN — SODIUM POLYSTYRENE SULFONATE 30 GRAM(S): 4.1 POWDER, FOR SUSPENSION ORAL at 06:31

## 2020-01-01 RX ADMIN — CHLORHEXIDINE GLUCONATE 15 MILLILITER(S): 213 SOLUTION TOPICAL at 17:38

## 2020-01-01 RX ADMIN — ENOXAPARIN SODIUM 40 MILLIGRAM(S): 100 INJECTION SUBCUTANEOUS at 22:49

## 2020-01-01 RX ADMIN — SODIUM CHLORIDE 75 MILLILITER(S): 9 INJECTION, SOLUTION INTRAVENOUS at 22:19

## 2020-01-01 RX ADMIN — Medication 100 MILLIGRAM(S): at 13:27

## 2020-01-01 RX ADMIN — Medication 2: at 13:45

## 2020-01-01 RX ADMIN — Medication 200 MILLIGRAM(S): at 11:14

## 2020-01-01 RX ADMIN — CEFTRIAXONE 100 MILLIGRAM(S): 500 INJECTION, POWDER, FOR SOLUTION INTRAMUSCULAR; INTRAVENOUS at 10:05

## 2020-01-01 RX ADMIN — Medication 2: at 08:12

## 2020-01-01 RX ADMIN — Medication 100 MILLIGRAM(S): at 00:40

## 2020-01-01 RX ADMIN — HEPARIN SODIUM 5000 UNIT(S): 5000 INJECTION INTRAVENOUS; SUBCUTANEOUS at 07:54

## 2020-01-01 RX ADMIN — HEPARIN SODIUM 5000 UNIT(S): 5000 INJECTION INTRAVENOUS; SUBCUTANEOUS at 21:12

## 2020-01-01 RX ADMIN — Medication 5.66 MICROGRAM(S)/KG/MIN: at 17:44

## 2020-01-01 RX ADMIN — SODIUM ZIRCONIUM CYCLOSILICATE 10 GRAM(S): 10 POWDER, FOR SUSPENSION ORAL at 17:59

## 2020-01-01 RX ADMIN — Medication 10 MILLIGRAM(S): at 11:30

## 2020-01-01 RX ADMIN — HEPARIN SODIUM 5000 UNIT(S): 5000 INJECTION INTRAVENOUS; SUBCUTANEOUS at 05:17

## 2020-01-01 RX ADMIN — QUETIAPINE FUMARATE 50 MILLIGRAM(S): 200 TABLET, FILM COATED ORAL at 11:39

## 2020-01-01 RX ADMIN — Medication 10 UNIT(S): at 06:33

## 2020-01-01 RX ADMIN — MIDAZOLAM HYDROCHLORIDE 3.77 MG/KG/HR: 1 INJECTION, SOLUTION INTRAMUSCULAR; INTRAVENOUS at 22:00

## 2020-01-01 RX ADMIN — SODIUM ZIRCONIUM CYCLOSILICATE 10 GRAM(S): 10 POWDER, FOR SUSPENSION ORAL at 06:54

## 2020-01-01 RX ADMIN — Medication 60 MILLIGRAM(S): at 18:24

## 2020-01-01 RX ADMIN — CEFTRIAXONE 100 MILLIGRAM(S): 500 INJECTION, POWDER, FOR SOLUTION INTRAMUSCULAR; INTRAVENOUS at 17:39

## 2020-01-01 RX ADMIN — SODIUM ZIRCONIUM CYCLOSILICATE 10 GRAM(S): 10 POWDER, FOR SUSPENSION ORAL at 04:58

## 2020-01-01 RX ADMIN — HEPARIN SODIUM 5000 UNIT(S): 5000 INJECTION INTRAVENOUS; SUBCUTANEOUS at 05:06

## 2020-01-01 RX ADMIN — INSULIN HUMAN 10 UNIT(S): 100 INJECTION, SOLUTION SUBCUTANEOUS at 14:19

## 2020-01-01 RX ADMIN — Medication 60 MILLIGRAM(S): at 18:02

## 2020-01-01 RX ADMIN — PROPOFOL 2.26 MICROGRAM(S)/KG/MIN: 10 INJECTION, EMULSION INTRAVENOUS at 20:28

## 2020-01-01 RX ADMIN — QUETIAPINE FUMARATE 50 MILLIGRAM(S): 200 TABLET, FILM COATED ORAL at 02:39

## 2020-01-01 RX ADMIN — PANTOPRAZOLE SODIUM 40 MILLIGRAM(S): 20 TABLET, DELAYED RELEASE ORAL at 07:04

## 2020-01-01 RX ADMIN — SODIUM POLYSTYRENE SULFONATE 30 GRAM(S): 4.1 POWDER, FOR SUSPENSION ORAL at 02:53

## 2020-01-01 RX ADMIN — Medication 60 MILLIGRAM(S): at 18:00

## 2020-01-01 RX ADMIN — SODIUM ZIRCONIUM CYCLOSILICATE 10 GRAM(S): 10 POWDER, FOR SUSPENSION ORAL at 17:04

## 2020-01-01 RX ADMIN — HEPARIN SODIUM 5000 UNIT(S): 5000 INJECTION INTRAVENOUS; SUBCUTANEOUS at 05:04

## 2020-01-01 RX ADMIN — CHLORHEXIDINE GLUCONATE 1 APPLICATION(S): 213 SOLUTION TOPICAL at 05:31

## 2020-01-01 RX ADMIN — Medication 2: at 09:55

## 2020-01-01 RX ADMIN — Medication 100 MILLIGRAM(S): at 00:57

## 2020-01-01 RX ADMIN — CHLORHEXIDINE GLUCONATE 15 MILLILITER(S): 213 SOLUTION TOPICAL at 19:13

## 2020-01-01 RX ADMIN — Medication 106 MICROGRAM(S)/KG/MIN: at 17:43

## 2020-01-01 RX ADMIN — Medication 60 MILLIGRAM(S): at 17:09

## 2020-01-01 RX ADMIN — AZITHROMYCIN 500 MILLIGRAM(S): 500 TABLET, FILM COATED ORAL at 21:32

## 2020-01-01 RX ADMIN — SODIUM ZIRCONIUM CYCLOSILICATE 10 GRAM(S): 10 POWDER, FOR SUSPENSION ORAL at 21:14

## 2020-01-01 RX ADMIN — Medication 10 UNIT(S): at 00:50

## 2020-01-01 RX ADMIN — CHLORHEXIDINE GLUCONATE 15 MILLILITER(S): 213 SOLUTION TOPICAL at 17:06

## 2020-01-01 RX ADMIN — AZITHROMYCIN 255 MILLIGRAM(S): 500 TABLET, FILM COATED ORAL at 13:47

## 2020-01-01 RX ADMIN — HEPARIN SODIUM 5000 UNIT(S): 5000 INJECTION INTRAVENOUS; SUBCUTANEOUS at 14:04

## 2020-01-01 RX ADMIN — HEPARIN SODIUM 5000 UNIT(S): 5000 INJECTION INTRAVENOUS; SUBCUTANEOUS at 05:58

## 2020-01-01 RX ADMIN — Medication 106 MICROGRAM(S)/KG/MIN: at 17:01

## 2020-01-01 RX ADMIN — INSULIN GLARGINE 30 UNIT(S): 100 INJECTION, SOLUTION SUBCUTANEOUS at 23:47

## 2020-01-01 RX ADMIN — LACTULOSE 20 GRAM(S): 10 SOLUTION ORAL at 22:52

## 2020-01-01 RX ADMIN — Medication 200 MILLIGRAM(S): at 14:21

## 2020-01-01 RX ADMIN — HEPARIN SODIUM 5000 UNIT(S): 5000 INJECTION INTRAVENOUS; SUBCUTANEOUS at 14:00

## 2020-01-01 RX ADMIN — Medication 10 UNIT(S): at 07:35

## 2020-01-01 RX ADMIN — CHLORHEXIDINE GLUCONATE 15 MILLILITER(S): 213 SOLUTION TOPICAL at 05:03

## 2020-01-01 RX ADMIN — HEPARIN SODIUM 5000 UNIT(S): 5000 INJECTION INTRAVENOUS; SUBCUTANEOUS at 21:38

## 2020-01-01 RX ADMIN — Medication 2: at 06:33

## 2020-01-01 RX ADMIN — SODIUM ZIRCONIUM CYCLOSILICATE 10 GRAM(S): 10 POWDER, FOR SUSPENSION ORAL at 17:10

## 2020-01-01 RX ADMIN — Medication 10 UNIT(S): at 00:44

## 2020-01-01 RX ADMIN — HEPARIN SODIUM 5000 UNIT(S): 5000 INJECTION INTRAVENOUS; SUBCUTANEOUS at 14:53

## 2020-01-01 RX ADMIN — MORPHINE SULFATE 2 MG/HR: 50 CAPSULE, EXTENDED RELEASE ORAL at 19:29

## 2020-01-01 RX ADMIN — SODIUM CHLORIDE 75 MILLILITER(S): 9 INJECTION, SOLUTION INTRAVENOUS at 14:00

## 2020-01-01 RX ADMIN — CHLORHEXIDINE GLUCONATE 15 MILLILITER(S): 213 SOLUTION TOPICAL at 04:20

## 2020-01-01 RX ADMIN — HEPARIN SODIUM 5000 UNIT(S): 5000 INJECTION INTRAVENOUS; SUBCUTANEOUS at 13:19

## 2020-01-01 RX ADMIN — Medication 650 MILLIGRAM(S): at 21:31

## 2020-01-01 RX ADMIN — Medication 10 UNIT(S): at 14:14

## 2020-01-01 RX ADMIN — Medication 7 UNIT(S): at 13:18

## 2020-01-01 RX ADMIN — Medication 100 MILLIGRAM(S): at 14:07

## 2020-01-01 RX ADMIN — Medication 60 MILLIGRAM(S): at 17:28

## 2020-01-01 RX ADMIN — SODIUM ZIRCONIUM CYCLOSILICATE 10 GRAM(S): 10 POWDER, FOR SUSPENSION ORAL at 05:30

## 2020-01-01 RX ADMIN — MORPHINE SULFATE 4 MILLIGRAM(S): 50 CAPSULE, EXTENDED RELEASE ORAL at 12:14

## 2020-01-01 RX ADMIN — Medication 60 MILLIGRAM(S): at 06:38

## 2020-01-01 RX ADMIN — Medication 60 MILLIGRAM(S): at 05:12

## 2020-01-01 RX ADMIN — QUETIAPINE FUMARATE 50 MILLIGRAM(S): 200 TABLET, FILM COATED ORAL at 11:31

## 2020-01-01 RX ADMIN — Medication 60 MILLIGRAM(S): at 17:04

## 2020-01-01 RX ADMIN — Medication 650 MILLIGRAM(S): at 00:40

## 2020-01-01 RX ADMIN — Medication 500 MILLILITER(S): at 21:01

## 2020-01-01 RX ADMIN — Medication 100 MILLIGRAM(S): at 18:05

## 2020-01-01 RX ADMIN — Medication 60 MILLIGRAM(S): at 17:10

## 2020-01-01 RX ADMIN — Medication 250 MILLIGRAM(S): at 09:55

## 2020-01-01 RX ADMIN — CHLORHEXIDINE GLUCONATE 15 MILLILITER(S): 213 SOLUTION TOPICAL at 05:58

## 2020-01-01 RX ADMIN — HEPARIN SODIUM 5000 UNIT(S): 5000 INJECTION INTRAVENOUS; SUBCUTANEOUS at 22:19

## 2020-01-01 RX ADMIN — INSULIN GLARGINE 30 UNIT(S): 100 INJECTION, SOLUTION SUBCUTANEOUS at 22:30

## 2020-01-01 RX ADMIN — Medication 250 MILLIGRAM(S): at 08:14

## 2020-01-01 RX ADMIN — HEPARIN SODIUM 5000 UNIT(S): 5000 INJECTION INTRAVENOUS; SUBCUTANEOUS at 21:43

## 2020-01-01 RX ADMIN — HEPARIN SODIUM 5000 UNIT(S): 5000 INJECTION INTRAVENOUS; SUBCUTANEOUS at 05:12

## 2020-01-01 RX ADMIN — Medication 60 MILLIGRAM(S): at 05:59

## 2020-01-01 RX ADMIN — SODIUM CHLORIDE 250 MILLILITER(S): 9 INJECTION INTRAMUSCULAR; INTRAVENOUS; SUBCUTANEOUS at 14:53

## 2020-01-01 RX ADMIN — CEFTRIAXONE 100 MILLIGRAM(S): 500 INJECTION, POWDER, FOR SOLUTION INTRAMUSCULAR; INTRAVENOUS at 14:29

## 2020-01-01 RX ADMIN — AZITHROMYCIN 250 MILLIGRAM(S): 500 TABLET, FILM COATED ORAL at 14:20

## 2020-01-01 RX ADMIN — HEPARIN SODIUM 5000 UNIT(S): 5000 INJECTION INTRAVENOUS; SUBCUTANEOUS at 12:01

## 2020-01-01 RX ADMIN — HEPARIN SODIUM 5000 UNIT(S): 5000 INJECTION INTRAVENOUS; SUBCUTANEOUS at 15:45

## 2020-01-01 RX ADMIN — Medication 60 MILLIGRAM(S): at 05:04

## 2020-01-01 RX ADMIN — LACTULOSE 20 GRAM(S): 10 SOLUTION ORAL at 21:10

## 2020-01-01 RX ADMIN — MIDAZOLAM HYDROCHLORIDE 3 MILLIGRAM(S): 1 INJECTION, SOLUTION INTRAMUSCULAR; INTRAVENOUS at 18:30

## 2020-01-01 RX ADMIN — CHLORHEXIDINE GLUCONATE 15 MILLILITER(S): 213 SOLUTION TOPICAL at 04:57

## 2020-01-01 RX ADMIN — Medication 60 MILLIGRAM(S): at 17:13

## 2020-01-01 RX ADMIN — Medication 200 MILLIGRAM(S): at 23:16

## 2020-01-01 RX ADMIN — CHLORHEXIDINE GLUCONATE 15 MILLILITER(S): 213 SOLUTION TOPICAL at 05:13

## 2020-01-01 RX ADMIN — SODIUM POLYSTYRENE SULFONATE 15 GRAM(S): 4.1 POWDER, FOR SUSPENSION ORAL at 04:18

## 2020-01-01 RX ADMIN — Medication 100 MILLIGRAM(S): at 13:18

## 2020-01-01 RX ADMIN — HEPARIN SODIUM 5000 UNIT(S): 5000 INJECTION INTRAVENOUS; SUBCUTANEOUS at 13:33

## 2020-01-01 RX ADMIN — ENOXAPARIN SODIUM 40 MILLIGRAM(S): 100 INJECTION SUBCUTANEOUS at 00:12

## 2020-01-01 RX ADMIN — INSULIN GLARGINE 30 UNIT(S): 100 INJECTION, SOLUTION SUBCUTANEOUS at 22:29

## 2020-01-01 RX ADMIN — Medication 100 MILLIGRAM(S): at 18:23

## 2020-01-01 RX ADMIN — CHLORHEXIDINE GLUCONATE 15 MILLILITER(S): 213 SOLUTION TOPICAL at 06:43

## 2020-01-01 RX ADMIN — Medication 60 MILLIGRAM(S): at 05:18

## 2020-01-01 RX ADMIN — Medication 10 UNIT(S): at 00:39

## 2020-01-01 RX ADMIN — Medication 100 MILLIGRAM(S): at 01:43

## 2020-01-01 RX ADMIN — CHLORHEXIDINE GLUCONATE 15 MILLILITER(S): 213 SOLUTION TOPICAL at 05:12

## 2020-01-01 RX ADMIN — Medication 400 MILLIGRAM(S): at 00:42

## 2020-01-01 RX ADMIN — SODIUM ZIRCONIUM CYCLOSILICATE 10 GRAM(S): 10 POWDER, FOR SUSPENSION ORAL at 17:38

## 2020-01-01 RX ADMIN — Medication 1000 MILLILITER(S): at 14:04

## 2020-01-01 RX ADMIN — Medication 500 MILLILITER(S): at 10:00

## 2020-01-01 RX ADMIN — HEPARIN SODIUM 5000 UNIT(S): 5000 INJECTION INTRAVENOUS; SUBCUTANEOUS at 21:27

## 2020-01-01 RX ADMIN — CEFTRIAXONE 100 MILLIGRAM(S): 500 INJECTION, POWDER, FOR SOLUTION INTRAMUSCULAR; INTRAVENOUS at 16:26

## 2020-01-01 RX ADMIN — Medication 60 MILLIGRAM(S): at 05:43

## 2020-01-01 RX ADMIN — Medication 10 UNIT(S): at 23:16

## 2020-01-01 RX ADMIN — SODIUM CHLORIDE 70 MILLILITER(S): 9 INJECTION, SOLUTION INTRAVENOUS at 08:18

## 2020-01-01 RX ADMIN — Medication 50 MILLILITER(S): at 18:26

## 2020-01-01 RX ADMIN — INSULIN HUMAN 10 UNIT(S): 100 INJECTION, SOLUTION SUBCUTANEOUS at 12:34

## 2020-01-01 RX ADMIN — CHLORHEXIDINE GLUCONATE 15 MILLILITER(S): 213 SOLUTION TOPICAL at 17:04

## 2020-01-01 RX ADMIN — QUETIAPINE FUMARATE 50 MILLIGRAM(S): 200 TABLET, FILM COATED ORAL at 12:00

## 2020-01-01 RX ADMIN — SODIUM POLYSTYRENE SULFONATE 15 GRAM(S): 4.1 POWDER, FOR SUSPENSION ORAL at 15:17

## 2020-01-01 RX ADMIN — Medication 2: at 17:48

## 2020-01-01 RX ADMIN — Medication 100 MILLIGRAM(S): at 06:30

## 2020-01-01 RX ADMIN — Medication 7.07 MICROGRAM(S)/KG/MIN: at 22:00

## 2020-01-01 RX ADMIN — Medication 60 MILLIGRAM(S): at 06:36

## 2020-01-01 RX ADMIN — CHLORHEXIDINE GLUCONATE 15 MILLILITER(S): 213 SOLUTION TOPICAL at 17:39

## 2020-01-01 RX ADMIN — HEPARIN SODIUM 5000 UNIT(S): 5000 INJECTION INTRAVENOUS; SUBCUTANEOUS at 22:48

## 2020-01-01 RX ADMIN — INSULIN HUMAN 10 UNIT(S): 100 INJECTION, SOLUTION SUBCUTANEOUS at 21:14

## 2020-01-01 RX ADMIN — CHLORHEXIDINE GLUCONATE 1 APPLICATION(S): 213 SOLUTION TOPICAL at 05:12

## 2020-01-01 RX ADMIN — CEFTRIAXONE 100 MILLIGRAM(S): 500 INJECTION, POWDER, FOR SOLUTION INTRAMUSCULAR; INTRAVENOUS at 13:17

## 2020-01-01 RX ADMIN — AZITHROMYCIN 250 MILLIGRAM(S): 500 TABLET, FILM COATED ORAL at 14:28

## 2020-01-01 RX ADMIN — SODIUM ZIRCONIUM CYCLOSILICATE 10 GRAM(S): 10 POWDER, FOR SUSPENSION ORAL at 20:32

## 2020-01-01 RX ADMIN — CEFTRIAXONE 100 MILLIGRAM(S): 500 INJECTION, POWDER, FOR SOLUTION INTRAMUSCULAR; INTRAVENOUS at 12:32

## 2020-01-01 RX ADMIN — Medication 4: at 12:32

## 2020-01-01 RX ADMIN — Medication 100 MILLIGRAM(S): at 18:19

## 2020-01-01 RX ADMIN — Medication 4: at 13:30

## 2020-01-01 RX ADMIN — Medication 60 MILLIGRAM(S): at 19:13

## 2020-01-01 RX ADMIN — SODIUM ZIRCONIUM CYCLOSILICATE 10 GRAM(S): 10 POWDER, FOR SUSPENSION ORAL at 18:32

## 2020-01-01 RX ADMIN — CHLORHEXIDINE GLUCONATE 15 MILLILITER(S): 213 SOLUTION TOPICAL at 06:25

## 2020-01-01 RX ADMIN — HEPARIN SODIUM 5000 UNIT(S): 5000 INJECTION INTRAVENOUS; SUBCUTANEOUS at 05:30

## 2020-01-01 RX ADMIN — CEFTRIAXONE 100 MILLIGRAM(S): 500 INJECTION, POWDER, FOR SOLUTION INTRAMUSCULAR; INTRAVENOUS at 14:53

## 2020-01-01 RX ADMIN — SODIUM CHLORIDE 75 MILLILITER(S): 9 INJECTION INTRAMUSCULAR; INTRAVENOUS; SUBCUTANEOUS at 14:21

## 2020-01-01 RX ADMIN — CHLORHEXIDINE GLUCONATE 15 MILLILITER(S): 213 SOLUTION TOPICAL at 18:24

## 2020-01-01 RX ADMIN — Medication 60 MILLIGRAM(S): at 17:38

## 2020-01-01 RX ADMIN — Medication 10 UNIT(S): at 06:35

## 2020-01-01 RX ADMIN — CHLORHEXIDINE GLUCONATE 15 MILLILITER(S): 213 SOLUTION TOPICAL at 05:02

## 2020-01-01 RX ADMIN — Medication 200 MILLIGRAM(S): at 00:13

## 2020-01-01 RX ADMIN — Medication 60 MILLIGRAM(S): at 06:16

## 2020-01-01 RX ADMIN — QUETIAPINE FUMARATE 50 MILLIGRAM(S): 200 TABLET, FILM COATED ORAL at 11:14

## 2020-01-01 RX ADMIN — HEPARIN SODIUM 5000 UNIT(S): 5000 INJECTION INTRAVENOUS; SUBCUTANEOUS at 13:49

## 2020-01-01 RX ADMIN — CEFTRIAXONE 100 MILLIGRAM(S): 500 INJECTION, POWDER, FOR SOLUTION INTRAMUSCULAR; INTRAVENOUS at 13:48

## 2020-01-01 RX ADMIN — CHLORHEXIDINE GLUCONATE 1 APPLICATION(S): 213 SOLUTION TOPICAL at 05:02

## 2020-01-01 RX ADMIN — Medication 60 MILLIGRAM(S): at 05:13

## 2020-01-01 RX ADMIN — HEPARIN SODIUM 5000 UNIT(S): 5000 INJECTION INTRAVENOUS; SUBCUTANEOUS at 23:10

## 2020-01-01 RX ADMIN — CHLORHEXIDINE GLUCONATE 15 MILLILITER(S): 213 SOLUTION TOPICAL at 05:34

## 2020-01-01 RX ADMIN — HEPARIN SODIUM 5000 UNIT(S): 5000 INJECTION INTRAVENOUS; SUBCUTANEOUS at 04:20

## 2020-01-01 RX ADMIN — MIDAZOLAM HYDROCHLORIDE 3.77 MG/KG/HR: 1 INJECTION, SOLUTION INTRAMUSCULAR; INTRAVENOUS at 14:29

## 2020-03-25 NOTE — ED PROVIDER NOTE - PATIENT PORTAL LINK FT
You can access the FollowMyHealth Patient Portal offered by [facility] by registering at the following website: [email or web address]. By joining [facility]’s FollowMyHealth portal, you will also be able to view your health information using other applications (apps) compatible with our system.

## 2020-03-25 NOTE — ED PROVIDER NOTE - OBJECTIVE STATEMENT
59 year old male with no PMHx presenting with fever and cough xfew days. Patient denies sore throat, vomiting, or any other acute complaints.

## 2020-03-25 NOTE — ED PROVIDER NOTE - NSFOLLOWUPINSTRUCTIONS_ED_ALL_ED_FT
You may have Coronavirus    COVID-19 testing are currently being prioritized at [facility] for admitted patients.     Patients that are stable for discharged from the ED will have COVID-19 testing performed within 7-10 days at which time most symptoms should improve.  Please follow instruction on provided COVID-19 discharge educational forms and self quarantine for 14 days.     In addition, you have been placed on our surveillance tracker. Return to the ED immediately if you have *shortness of breath*, fever, pain, weakness, vomiting any concerns.    1. STAY HOME for 14 DAYS,  Minimize Human contact to ONLY ESSENTIAL  2. Every time you wash your hands, sing the HAPPY BIRTHDAY Song so you know you're washing long enough.  Make sure to scrub the webspace between your fingers.  3. DRINK 1-2 Liters of Pedialyte or Sugarfree Gatorade per day x at least 5 days.  The more you drink, the more energy you will have.  Your fatigue, lightheadedness, and body aches will decrease and your fever has a better chance of breaking if you are well hydrated.    4. For your Fever and Body aches takes TYLENOL 650 mg every 6 hours   5. Use an inhaler for mild shortness of breath and cough.  6 AVOID MOTRIN ADVIL IBUPROFEN ALEEVE NAPROXEN  7. Buy a Pulse Oximeter to check your Oxygen  8. Stay away from anyone that is elderly, even if you live with them.  9. Even if you feel better, you must stay isolated for at least 3 days after your symptoms resolve without taking tylenol.    RETURN TO THE ER IMMEDIATELY IF YOU HAVE WORSENING SHORTNESS OF BREATH OR Oxygen < 93%    Consider Using [employer] NOW To get Pedialyte AND CAPSULE FOR Medicine to be delivered to your home.

## 2020-03-31 NOTE — H&P ADULT - NSHPPHYSICALEXAM_GEN_ALL_CORE
Vital Signs Last 24 Hrs  T(C): 36.7 (31 Mar 2020 11:00), Max: 36.8 (31 Mar 2020 09:36)  T(F): 98 (31 Mar 2020 11:00), Max: 98.2 (31 Mar 2020 09:36)  HR: 126 (31 Mar 2020 11:00) (125 - 126)  BP: 124/89 (31 Mar 2020 11:00) (124/89 - 144/87)  BP(mean): --  RR: 22 (31 Mar 2020 11:00) (22 - 24)  SpO2: 94% (31 Mar 2020 11:00) (84% - 94%)    PHYSICAL EXAM:  GENERAL: NAD  HEENT: Normocephalic;  conjunctivae and sclerae clear; moist mucous membranes;   NECK : supple  CHEST/LUNG: Clear to auscultation bilaterally with good air entry   HEART: S1 S2  regular; no murmurs, gallops or rubs  ABDOMEN: Soft, Nontender, Nondistended; Bowel sounds present  EXTREMITIES: no cyanosis; no edema; no calf tenderness  SKIN: warm and dry; no rash  NERVOUS SYSTEM:  Awake and alert; Oriented  to place, person and time ; no new deficits

## 2020-03-31 NOTE — H&P ADULT - PROBLEM SELECTOR PLAN 2
IMPROVE VTE Individual Risk Assessment  RISK                                                                Points  [  ] Previous VTE                                                  3  [  ] Thrombophilia                                               2  [  ] Lower limb paralysis                                      2        (unable to hold up >15 seconds)    [  ] Current Cancer                                              2         (within 6 months)  [x  ] Immobilization > 24 hrs                                1  [  ] ICU/CCU stay > 24 hours                              1  [x  ] Age > 60                                                      1  IMPROVE VTE Score _________2, lovenox for DVT proph

## 2020-03-31 NOTE — H&P ADULT - ATTENDING COMMENTS
Patient was seen and examined by myself. Case was discussed with house staff in details. I have reviewed and agree with the plan as outlined above with edits where appropriate.      MEDICAL ATTENDING NOTE    Patient is a 59y old  Male who presents with a chief complaint of     REVIEW OF SYSTEMS: As in HPI    Vital Signs Last 24 Hrs  T(C): 38.1 (31 Mar 2020 22:14), Max: 38.1 (31 Mar 2020 22:14)  T(F): 100.6 (31 Mar 2020 22:14), Max: 100.6 (31 Mar 2020 22:14)  HR: 114 (31 Mar 2020 22:14) (91 - 126)  BP: 115/88 (31 Mar 2020 22:14) (115/88 - 144/87)  BP(mean): --  RR: 20 (31 Mar 2020 23:54) (20 - 24)  SpO2: 94% (31 Mar 2020 23:54) (84% - 100%)    _________________  PHYSICAL EXAM:  ---------------------------   NAD; Normocephalic;   LUNGS - no wheezing  HEART: S1 S2+   ABDOMEN: Soft, Nontender, non distended  EXTREMITIES: no cyanosis; no edema  NERVOUS SYSTEM:  Awake and alert; no new deficits  PSYCH: Normal affect and behavior  VASC: + distal peripheral pulses  MSK: normal ROM,  normal shape  SKIN: warm and dry    _________________________________________________  LABS:                        14.3   6.98  )-----------( 256      ( 31 Mar 2020 11:08 )             41.6     03-31    140  |  111<H>  |  19<H>  ----------------------------<  129<H>  3.8   |  21<L>  |  1.10    Ca    9.1      31 Mar 2020 11:08    TPro  8.6<H>  /  Alb  2.9<L>  /  TBili  0.6  /  DBili  x   /  AST  88<H>  /  ALT  78<H>  /  AlkPhos  66  03-31                Impression and plan : Pneumonia due to novel corona virus with possible superimposed bacterial infection + sepsis  Empiric antibiotics   continue to monitor closely  supplemental oxygen as need  Supportive measures  Tylenol for fever  Robitussin for cough  Continue Isolation precautions based on COVID-19 infection control protocol        Patient 's test results, diagnostic and therapeutic options and overall plan of care were discussed. Patient was seen and examined by myself. Case was discussed with house staff in details. I have reviewed and agree with the plan as outlined above with edits where appropriate.      MEDICAL ATTENDING NOTE    Patient is a 59y old  Male who presents with a chief complaint of cough and SOB    REVIEW OF SYSTEMS: As in HPI    Vital Signs Last 24 Hrs  T(C): 38.1 (31 Mar 2020 22:14), Max: 38.1 (31 Mar 2020 22:14)  T(F): 100.6 (31 Mar 2020 22:14), Max: 100.6 (31 Mar 2020 22:14)  HR: 114 (31 Mar 2020 22:14) (91 - 126)  BP: 115/88 (31 Mar 2020 22:14) (115/88 - 144/87)  RR: 20 (31 Mar 2020 23:54) (20 - 24)  SpO2: 94% (31 Mar 2020 23:54) (84% - 100%)    _________________  PHYSICAL EXAM:  ---------------------------   NAD; Normocephalic;   LUNGS - no wheezing  HEART: S1 S2+   ABDOMEN: Soft, Nontender, non distended  EXTREMITIES: no cyanosis; no edema  NERVOUS SYSTEM:  Awake and alert; no new deficits  PSYCH: Normal affect and behavior  VASC: + distal peripheral pulses  MSK: normal ROM,  normal shape  SKIN: warm and dry    _________________________________________________  LABS:                        14.3   6.98  )-----------( 256      ( 31 Mar 2020 11:08 )             41.6     03-31    140  |  111<H>  |  19<H>  ----------------------------<  129<H>  3.8   |  21<L>  |  1.10    Ca    9.1      31 Mar 2020 11:08    TPro  8.6<H>  /  Alb  2.9<L>  /  TBili  0.6  /  DBili  x   /  AST  88<H>  /  ALT  78<H>  /  AlkPhos  66  03-31        Impression and plan :   - Pneumonia due to novel corona virus with possible superimposed bacterial infection  with hypoxia  Empiric antibiotics   Follow up COVID -19 PCR  Follow up cultures  continue to monitor closely  supplemental oxygen as need  Supportive measures  Tylenol for fever  Robitussin for cough  Continue Isolation precautions based on COVID-19 infection control protocol        Patient 's test results, diagnostic and therapeutic options and overall plan of care were discussed.

## 2020-03-31 NOTE — PATIENT PROFILE ADULT - NSPROEDALEARNPREF_GEN_A_NUR
Eileen Harrison is a 23year old male who presents for a complete physical exam.   HPI:   Work: Works as a  for [employer]. Social: Lives with family. Diet: eats home cooked meals and take out.   Sometimes more takeout since he is on the r glasses  HEENT: denies nasal congestion  LUNGS: denies shortness of breath with exertion, denies orthopnea  CARDIOVASCULAR: denies chest pain on exertion  GI: denies abdominal pain,denies heartburn  : denies nocturia or changes in stream  MUSCULOSKELETAL verbal instruction/written material

## 2020-03-31 NOTE — CHART NOTE - NSCHARTNOTEFT_GEN_A_CORE
Pt received from the [facility]. Pt comes with chief c/o Cough and SOB.   COVID swab sent.   Follow up morning labs and CXR.

## 2020-03-31 NOTE — H&P ADULT - PROBLEM SELECTOR PLAN 1
p/w Shortness of breath, cough   - Respiratory status- Not in distress, S02 99% on NRB on my evaluation  - T- afebrile  - WBC: WNL, lymphopenia 0.61   - CXR -- There are bilateral diffuse patchy airspace opacities.   - Ed course; Rocephin and Azithro, IV NS 1 lt.   - Will c/w Rocephin and Azithro to cover for CAP.   - Tylenol, Robitussin PRN  - Will rule out covid 19 with contact and airborne isolation precaution   - LDH, Procalcitonin, ferritin   - Blood culture , COVID, RVP, FLU

## 2020-03-31 NOTE — ED ADULT NURSE NOTE - OBJECTIVE STATEMENT
Pt c/o Increasing Diff to breathe, cough. Also c/o diarrhea. Seen in ED x last Thursday. Upon assessment, pt on O2 non-rebreather at this time, denies difficulty breathing at this time. Denies chest pain. Safety maintained.

## 2020-03-31 NOTE — ED PROVIDER NOTE - OBJECTIVE STATEMENT
59 year old male with no pertinent PMHx or PSHx presents to the ED with complaints of one week of a cough and shortness of breath. Patient reports some associated nausea, vomiting, diarrhea, generalized weakness, and body aches with his symptoms. Patient describes his symptoms as moderate to severe with gradual onset. Patient denies all other acute complaints. Patient denies any recent known sick contact. NKDA.

## 2020-03-31 NOTE — H&P ADULT - ASSESSMENT
59M from home, lives with family, with no pertinent PMHx or PSHx presents to the ED with complaints of one week of a cough and shortness of breath. Admitted for pneumonia, R/O COVID.

## 2020-03-31 NOTE — ED ADULT NURSE NOTE - NSIMPLEMENTINTERV_GEN_ALL_ED
Implemented All Fall Risk Interventions:  [place] to call system. Call bell, personal items and telephone within reach. Instruct patient to call for assistance. Room bathroom lighting operational. Non-slip footwear when patient is off stretcher. Physically safe environment: no spills, clutter or unnecessary equipment. Stretcher in lowest position, wheels locked, appropriate side rails in place. Provide visual cue, wrist band, yellow gown, etc. Monitor gait and stability. Monitor for mental status changes and reorient to person, place, and time. Review medications for side effects contributing to fall risk. Reinforce activity limits and safety measures with patient and family.

## 2020-04-01 NOTE — CONSULT NOTE ADULT - SUBJECTIVE AND OBJECTIVE BOX
AMAYA ORTEGA  59y, Male  Allergy: No Known Allergies      All historical available data reviewed.    HPI:       59y old  Male who presents with a chief complaint of COVID+ (01 Apr 2020 14:12), has no PMx  Patient on NRB, initially 15l, O2Sat 90%.    .  FAMILY HISTORY:    PAST MEDICAL & SURGICAL HISTORY:  No significant past surgical history        VITALS:  T(F): 96.9, Max: 101.3 (04-01-20 @ 08:00)  HR: 104  BP: 117/72  RR: 22Vital Signs Last 24 Hrs  T(C): 36.1 (01 Apr 2020 12:00), Max: 38.5 (01 Apr 2020 08:00)  T(F): 96.9 (01 Apr 2020 12:00), Max: 101.3 (01 Apr 2020 08:00)  HR: 104 (01 Apr 2020 12:00) (91 - 114)  BP: 117/72 (01 Apr 2020 12:00) (111/69 - 147/76)  BP(mean): --  RR: 22 (01 Apr 2020 12:00) (20 - 22)  SpO2: 95% (01 Apr 2020 12:00) (90% - 100%)    TESTS & MEASUREMENTS:                        13.6   7.18  )-----------( 279      ( 01 Apr 2020 05:42 )             40.2     04-01    144  |  107  |  17  ----------------------------<  125<H>  4.7   |  24  |  1.0    Ca    8.8      01 Apr 2020 05:42  Mg     2.2     04-01    TPro  7.1  /  Alb  3.4<L>  /  TBili  0.7  /  DBili  x   /  AST  58<H>  /  ALT  52<H>  /  AlkPhos  60  04-01    LIVER FUNCTIONS - ( 01 Apr 2020 05:42 )  Alb: 3.4 g/dL / Pro: 7.1 g/dL / ALK PHOS: 60 U/L / ALT: 52 U/L / AST: 58 U/L / GGT: x             Culture - Blood (collected 03-31-20 @ 13:59)  Source: .Blood  Preliminary Report (04-01-20 @ 14:03):    No growth to date.    Culture - Blood (collected 03-31-20 @ 13:59)  Source: .Blood  Preliminary Report (04-01-20 @ 14:03):    No growth to date.            RADIOLOGY & ADDITIONAL TESTS:  Personal review of radiological diagnostics performed  Echo and EKG results noted when applicable.     MEDICATIONS:  acetaminophen   Tablet .. 650 milliGRAM(s) Oral every 6 hours PRN  chlorhexidine 4% Liquid 1 Application(s) Topical <User Schedule>  enoxaparin Injectable 40 milliGRAM(s) SubCutaneous at bedtime  hydroxychloroquine   Oral   hydroxychloroquine 200 milliGRAM(s) Oral every 12 hours  influenza   Vaccine 0.5 milliLiter(s) IntraMuscular once      ANTIBIOTICS:  hydroxychloroquine   Oral   hydroxychloroquine 200 milliGRAM(s) Oral every 12 hours

## 2020-04-01 NOTE — PROGRESS NOTE ADULT - SUBJECTIVE AND OBJECTIVE BOX
AMAYA ORTEGA  59y  [facility] [id number] [address]      Patient is a 59y old  Male who presents with a chief complaint of COVID+ (01 Apr 2020 14:12), has no PMx      INTERVAL HPI/OVERNIGHT EVENTS: Patient on NRB, initially 15l, O2Sat 95%, unable to tolerate decrease to 10L, dropped to 90%. Now, back on 15L, not able to rest in prone position, now on the left side, able to speak short sentences.     REVIEW OF SYSTEMS: 10 systems reviewed, all unremarkable, except as above.   FAMILY HISTORY:    T(C): 36.1 (04-01-20 @ 12:00), Max: 38.5 (04-01-20 @ 08:00)  HR: 104 (04-01-20 @ 12:00) (91 - 114)  BP: 117/72 (04-01-20 @ 12:00) (111/69 - 147/76)  RR: 22 (04-01-20 @ 12:00) (20 - 22)  SpO2: 95% (04-01-20 @ 12:00) (90% - 100%)  Wt(kg): --Vital Signs Last 24 Hrs  T(C): 36.1 (01 Apr 2020 12:00), Max: 38.5 (01 Apr 2020 08:00)  T(F): 96.9 (01 Apr 2020 12:00), Max: 101.3 (01 Apr 2020 08:00)  HR: 104 (01 Apr 2020 12:00) (91 - 114)  BP: 117/72 (01 Apr 2020 12:00) (111/69 - 147/76)  BP(mean): --  RR: 22 (01 Apr 2020 12:00) (20 - 22)  SpO2: 95% (01 Apr 2020 12:00) (90% - 100%)    PHYSICAL EXAM:  GENERAL: well-developed, AAOx3, able to speak in short sentences  HEAD:  Atraumatic, normocephalic  ENT: EOMI, conjunctiva and sclera clear  NECK: Supple, no JVD, no thyroidmegaly noted  NEURO:  Motor strength B/L upper and lower extremities symmetrical; no focal deficits  PULM: tachypneic, no wheezing   CARDIAC: Regular rate and rhythm, S1/S2  GI: Soft, Nontender, nondistended; obese  EXTREMITIES:  no edema      LABS:                      13.6   7.18  )-----------( 279      ( 01 Apr 2020 05:42 )             40.2   04-01    144  |  107  |  17  ----------------------------<  125<H>  4.7   |  24  |  1.0    Ca    8.8      01 Apr 2020 05:42  Mg     2.2     04-01    TPro  7.1  /  Alb  3.4<L>  /  TBili  0.7  /  DBili  x   /  AST  58<H>  /  ALT  52<H>  /  AlkPhos  60  04-01      Culture - Blood (collected 31 Mar 2020 13:59)  Source: .Blood  Preliminary Report (01 Apr 2020 14:03):    No growth to date.    Culture - Blood (collected 31 Mar 2020 13:59)  Source: .Blood  Preliminary Report (01 Apr 2020 14:03):    No growth to date.      Inpatient Medications:  acetaminophen   Tablet .. 650 milliGRAM(s) Oral every 6 hours PRN  chlorhexidine 4% Liquid 1 Application(s) Topical <User Schedule>  enoxaparin Injectable 40 milliGRAM(s) SubCutaneous at bedtime  hydroxychloroquine   Oral   hydroxychloroquine 200 milliGRAM(s) Oral every 12 hours  influenza   Vaccine 0.5 milliLiter(s) IntraMuscular once        < from: Xray Chest 1 View-PORTABLE IMMEDIATE (04.01.20 @ 10:37) >  Impression:      Diffuse patchy bilateral pulmonary space opacities, similar to prior.       < end of copied text >

## 2020-04-01 NOTE — CONSULT NOTE ADULT - ASSESSMENT
59M from home, lives with family, with no pertinent PMHx or PSHx presents to the ED with complaints of one week of a cough and shortness of breath.    IMPRESSION;  COVID 19 with Hypoxemia O2 <93% and CXR with b/l opacities with high probability of evolving into cytokine response leading to resp failure and MV    RECOMMENDATIONS;   PLAQUENIL 400mg PO q12h x 2 doses, then 200mg BID PO x 4 days   - Monitor QTc   - Supportive care   - Azithro 500mg PO x1, then 250mg PO daily x 4 days   -ferritin, LDH, D Dimers, procal  -prognosis is very poor

## 2020-04-01 NOTE — PROGRESS NOTE ADULT - ASSESSMENT
Acute respiratory failure 2ndary to Covid19 pneumonia- on Plaquenil and NRB 15l/min, Sat 95%, transaminitis due likely due to Covid19, other offending agents  - high risk for intubation  - check CRP, troponins, LDH, follow up LFTs  - monitor EKG every other day for QT prolongation,  no contributing electrolyte abnormalities at this time  - supportive therapy, avoid IV fluids, oral hydration only at this time.

## 2020-04-01 NOTE — PROGRESS NOTE ADULT - SUBJECTIVE AND OBJECTIVE BOX
AMAYA ORTEGA 59y Male  MRN#: [id number]   CODE STATUS: FULL      SUBJECTIVE  Patient is a 59y old Male who presents with a chief complaint of diarrhea and SOB  Currently admitted to medicine with the primary diagnosis of Sars Cov2 pneumonia    Today is hospital day 1d,   INTERVAL HPI/OVERNIGHT EVENTS: patient still has loose stools and he feels comfortable on NRB unable to tolerate NC    Present Today:           Patino Catheter (x)No/ ()Yes?   Indication:             Central Line (x)No/ ()Yes?   Indication:          IV Fluids (x)No/ ()Yes? Type:  Rate:  Indication:    OBJECTIVE  PAST MEDICAL & SURGICAL HISTORY  No significant past surgical history    ALLERGIES:  No Known Allergies    HOME MEDICATIONS:  Home Medications:  Vitamin D3 50,000 intl units (1250 mcg) oral capsule: 1 cap(s) orally once a week (31 Mar 2020 23:18)    MEDICATIONS:  STANDING MEDICATIONS  chlorhexidine 4% Liquid 1 Application(s) Topical <User Schedule>  enoxaparin Injectable 40 milliGRAM(s) SubCutaneous at bedtime  hydroxychloroquine   Oral   hydroxychloroquine 200 milliGRAM(s) Oral every 12 hours  influenza   Vaccine 0.5 milliLiter(s) IntraMuscular once    PRN MEDICATIONS  acetaminophen   Tablet .. 650 milliGRAM(s) Oral every 6 hours PRN    VITAL SIGNS: Last 24 Hours  T(C): 36.1 (01 Apr 2020 12:00), Max: 38.5 (01 Apr 2020 08:00)  T(F): 96.9 (01 Apr 2020 12:00), Max: 101.3 (01 Apr 2020 08:00)  HR: 104 (01 Apr 2020 12:00) (91 - 114)  BP: 117/72 (01 Apr 2020 12:00) (111/69 - 147/76)  RR: 22 (01 Apr 2020 12:00) (20 - 22)  SpO2: 95% (01 Apr 2020 12:00) (90% - 100%)    LABS:                        13.6   7.18  )-----------( 279      ( 01 Apr 2020 05:42 )             40.2     04-01    144  |  107  |  17  ----------------------------<  125<H>  4.7   |  24  |  1.0    Ca    8.8      01 Apr 2020 05:42  Mg     2.2     04-01    TPro  7.1  /  Alb  3.4<L>  /  TBili  0.7  /  DBili  x   /  AST  58<H>  /  ALT  52<H>  /  AlkPhos  60  04-01    CARDIAC MARKERS ( 31 Mar 2020 11:08 )  <0.015 ng/mL / x     / x     / x     / x        COVID-19 PCR: Positive (03.31.20 @ 13:08)    Culture - Blood (collected 31 Mar 2020 13:59)  Source: .Blood  Preliminary Report (01 Apr 2020 14:03):    No growth to date.    Culture - Blood (collected 31 Mar 2020 13:59)  Source: .Blood  Preliminary Report (01 Apr 2020 14:03):    No growth to date.      RADIOLOGY:     Xray Chest 1 View-PORTABLE IMMEDIATE (04.01.20 @ 10:37) >  Diffuse patchy bilateral pulmonary space opacities, similar to prior.     Xray Chest 1 View-PORTABLE IMMEDIATE (03.31.20 @ 10:59) >  There are bilateral diffuse patchy airspace opacities. Heart size appears enlarged, likely exaggerated by projection. There are degenerative changes of the thoracic spine.    ECHO:      PHYSICAL EXAM: Examined patient in full PPE    GENERAL: NAD, well-developed, AAOx3, obese  HEENT:  Atraumatic, Normocephalic. EOMI, PERRLA, conjunctiva and sclera clear, No JVD  PULMONARY: Clear to auscultation bilaterally; No wheeze  CARDIOVASCULAR: Regular rate and rhythm; No murmurs, rubs, or gallops  GASTROINTESTINAL: Soft, Nontender, Nondistended; Bowel sounds present  MUSCULOSKELETAL:  2+ Peripheral Pulses, No clubbing, cyanosis, or edema  NEUROLOGY: non-focal  SKIN: No rashes or lesions    ASSESSMENT & PLAN  59M from home, lives with family, with no pertinent PMHx or PSHx presents to the ED with complaints of one week of a cough and shortness of breath.    #Acute hypoxic respiratory failure due to  SARS COV2 pneumonia  - saturating  95% on  10L O2, NRB' afebrile,   - CXR on admission: diffuse patchy opacities  - CXR 4/1: unchanged from prior  - lymphopenia+  - Procalcitonin:  CRP:   LDH:     - EKG on admission: 422ms  - BCX negative  - On plaquaneil   - ID consulted  - Supportive care: tylenol  pain, antussive, antiemetics  - montior for hypoxia , upgrade to ICU if deteriorates  - EP consulted for patch placement for qtc monitoring    #Transaminitis  - monitor CMP daily  - trending down    #Diarrhea  - likely due to COVID  - GI stool, pcr sent    #Obesity  - adviced weight loss    ACTIVITY: Increase as tolerated (within room)  DVT PPX: lovenox  GI PPX:  DIET: regular  CODE:  DIsposition: from home;   Pending:

## 2020-04-02 NOTE — PROGRESS NOTE ADULT - SUBJECTIVE AND OBJECTIVE BOX
AMAYA ORTEGA  59y  [facility] [id number]    Patient is a 59y old  Male who presents with a chief complaint of COVID 19 (02 Apr 2020 11:26)      INTERVAL HPI/ OVERNIGHT EVENTS: Patient is de-sating, on 15l/min NRB, tachypneic, no mental status change      T(C): 37.2 (04-02-20 @ 13:21), Max: 39.4 (04-01-20 @ 21:00)  HR: 119 (04-02-20 @ 11:38) (81 - 119)  BP: 131/67 (04-02-20 @ 07:45) (110/73 - 131/67)  RR: 36 (04-02-20 @ 11:38) (20 - 36)  SpO2: 80% (04-02-20 @ 11:38) (80% - 98%)  Wt(kg): --Vital Signs Last 24 Hrs  T(C): 37.2 (02 Apr 2020 13:21), Max: 39.4 (01 Apr 2020 21:00)  T(F): 98.9 (02 Apr 2020 13:21), Max: 103 (01 Apr 2020 21:00)  HR: 119 (02 Apr 2020 11:38) (81 - 119)  BP: 131/67 (02 Apr 2020 07:45) (110/73 - 131/67)  BP(mean): --  RR: 36 (02 Apr 2020 11:38) (20 - 36)  SpO2: 80% (02 Apr 2020 11:38) (80% - 98%)    PHYSICAL EXAM:  GENERAL: well-developed, AAOx3  ENT: EOMI, conjunctiva and sclera clear  NEURO:  Motor strength B/L upper and lower extremities symmetrical; no focal deficits  PULM: Clear to auscultation bilaterally  CARDIAC: Regular rate and rhythm  GI: Soft, Nontender, nondistended  EXTREMITIES:  no edema      LABS:                        13.2   7.81  )-----------( 307      ( 02 Apr 2020 05:06 )             38.8   04-02    143  |  105  |  19  ----------------------------<  114<H>  5.0   |  21  |  1.0    Ca    8.9      02 Apr 2020 05:06  Mg     2.4     04-02    TPro  7.1  /  Alb  3.2<L>  /  TBili  0.7  /  DBili  x   /  AST  59<H>  /  ALT  49<H>  /  AlkPhos  70  04-02    C-Reactive Protein, Serum (04.01.20 @ 05:42)    C-Reactive Protein, Serum: 32.58 mg/dL  Procalcitonin, Serum (04.01.20 @ 05:42)    Procalcitonin, Serum: 1.53    Culture - Blood (collected 31 Mar 2020 13:59)  Source: .Blood  Preliminary Report (01 Apr 2020 14:03):    No growth to date.    Culture - Blood (collected 31 Mar 2020 13:59)  Source: .Blood  Preliminary Report (01 Apr 2020 14:03):    No growth to date.    Inpatient Medications:  acetaminophen   Tablet .. 650 milliGRAM(s) Oral every 6 hours PRN  azithromycin   Tablet 250 milliGRAM(s) Oral daily  cefTRIAXone   IVPB 2000 milliGRAM(s) IV Intermittent every 24 hours  chlorhexidine 4% Liquid 1 Application(s) Topical <User Schedule>  enoxaparin Injectable 40 milliGRAM(s) SubCutaneous at bedtime  hydroxychloroquine 200 milliGRAM(s) Oral every 12 hours  influenza   Vaccine 0.5 milliLiter(s) IntraMuscular once  methylPREDNISolone sodium succinate Injectable 60 milliGRAM(s) IV Push two times a day  pantoprazole    Tablet 40 milliGRAM(s) Oral before breakfast    < from: Xray Chest 1 View- PORTABLE-Routine (04.02.20 @ 09:24) >  PROCEDURE DATE:  04/02/2020        INTERPRETATION:  Clinical History / Reason for exam: Shortness of Breath    Comparison : Chest radiograph 4/1/2020    Technique/Positioning: Frontal view of the chest    Findings:    Cardiac/mediastinum/hilum: Stable.    Lung parenchyma/Pleura: Stable bilateral opacities.     Skeleton/soft tissues: Stable    Impression:      Stable bilateral opacities.

## 2020-04-02 NOTE — PROGRESS NOTE ADULT - ASSESSMENT
Acute respiratory failure 2ndary to Covid19 pneumonia- on Plaquenil and NRB 15l/min, Sat 95%, transaminitis due likely due to Covid19, other offending agents  - high risk for intubation  -  troponins, LDH, ferratin, CRP, D-dimer, follow up LFTs  - monitor EKG every other day for QT prolongation,  no contributing electrolyte abnormalities at this time  - supportive therapy, avoid IV fluids, oral hydration if still tolerated  -IV antibiotics and Solumedrol as per ID and pulmonary, would add Anakinra 1.2-2 mg/kg in 2 divided dose for 3 days as this is an impending cytokine storm, will discuss with the team

## 2020-04-02 NOTE — CHART NOTE - NSCHARTNOTEFT_GEN_A_CORE
Floor UPGRADE NOTE:    59y Male transferred from floor to ICU    Patient is a 59y old Male who presents with a chief complaint of SOB  (02 Apr 2020 11:26)    The patient is currently admitted for the primary diagnosis of  Acute hypoxic respiratory failure secondary to SARS -COv2 pneumonia requiring supplemental oxygen      Indwelling vascular catheters: peripheral IV    Urinary Catheter:  none    59M from home, lives with family, with no pertinent PMHx or PSHx presents to the ED with complaints of one week of a cough and shortness of breath.    ASSESSMENT AND PLAN:   #Acute hypoxic respiratory failure due to  SARS COV2 pneumonia with high probability of evolving into cytokine response along with bacterial process  - saturating  91% on  15L O2, NRB' ; febrile  - CXR on admission: diffuse patchy opacities  - CXR 4/1: unchanged from prior  - CXR 4/2: stable opacities  - lymphopenia+  - Procalcitonin: 1.53 - indicating bacterial infection as well ; CRP: 32.58 fibrinogen>700  - EKG on admission: 422ms  - BCX negative  - On plaquaneil and azithromycin  - ID consulted, recommended IV ceftriaxone 2g q24h   - started solumedrol IV 60mg q12h  - Supportive care: tylenol  pain, antussive, antiemetics  - montior for hypoxia , upgrade to ICU if deteriorates  - EP consulted for patch placement for qtc monitoring  - ICU consulted for evaluation; upgrade order placed  - low threshold for intubation    #Transaminitis - trending down  - monitor CMP daily  - trending down    #Diarrhea- resolving  - likely due to COVID  - GI stool, pcr sent    #Obesity  - adviced weight loss  - sent A1c , TFT    ACTIVITY: Increase as tolerated (within room)  DVT PPX: lovenox  GI PPX: protonix  DIET: regular  CODE: Full  DIsposition: from home;   Pending: clinical imprvement

## 2020-04-02 NOTE — PROGRESS NOTE ADULT - ASSESSMENT
59M from home, lives with family, with no pertinent PMHx or PSHx presents to the ED with complaints of one week of a cough and shortness of breath.    IMPRESSION;  COVID 19 with Hypoxemia O2 <93% and CXR with b/l opacities with high probability of evolving into cytokine response leading to resp failure and MV  procal of 1.5 suggestive of a bacterial coinfection  BCx NG    RECOMMENDATIONS;  -Rocephin 2 gm iv q24h   PLAQUENIL 200mg BID PO x 4 days   - Monitor QTc   - Supportive care   - Azithro  250mg PO daily x 4 days   -ferritin, LDH, D Dimers, procal  -prognosis is very poor

## 2020-04-02 NOTE — CONSULT NOTE ADULT - SUBJECTIVE AND OBJECTIVE BOX
Patient is a 59y old  Male who presents with a chief complaint of transfer for Covid19 (02 Apr 2020 14:13)      HPI:  Patient is a 59y old Male who presents with a chief complaint of diarrhea and SOB. Currently admitted to medicine with the primary diagnosis of Sars Cov2 pneumonia      PAST MEDICAL & SURGICAL HISTORY:  No significant past surgical history      SOCIAL HX:   Smoking   -ve                      ETOH         -ve                   Other  -ve    FAMILY HISTORY:  :  No known cardiovacular family hisotry     ROS:  See HPI     Allergies    No Known Allergies    Intolerances          PHYSICAL EXAM    ICU Vital Signs Last 24 Hrs  T(C): 37.2 (02 Apr 2020 13:21), Max: 39.4 (01 Apr 2020 21:00)  T(F): 98.9 (02 Apr 2020 13:21), Max: 103 (01 Apr 2020 21:00)  HR: 119 (02 Apr 2020 11:38) (81 - 119)  BP: 131/67 (02 Apr 2020 07:45) (110/73 - 131/67)  RR: 36 (02 Apr 2020 11:38) (20 - 36)  SpO2: 88-92% on 100% NRM (02 Apr 2020 15:03) (80% - 98%)      General: speaks in sentences, in mild distress   HEENT:  JUDY              Lungs: Bilateral Crackles  Cardiovascular: tachy  Gastrointestinal: Soft, Positive BS  Musculoskeletal: No clubbing.  Moves all extremities.  Full range of motion   Skin: Warm.  Intact  Neurological: No motor or sensory deficit         LABS:                          13.2   7.81  )-----------( 307      ( 02 Apr 2020 05:06 )             38.8                                               04-02    143  |  105  |  19  ----------------------------<  114<H>  5.0   |  21  |  1.0    Ca    8.9      02 Apr 2020 05:06  Mg     2.4     04-02    TPro  7.1  /  Alb  3.2<L>  /  TBili  0.7  /  DBili  x   /  AST  59<H>  /  ALT  49<H>  /  AlkPhos  70  04-02                                                                                           LIVER FUNCTIONS - ( 02 Apr 2020 05:06 )  Alb: 3.2 g/dL / Pro: 7.1 g/dL / ALK PHOS: 70 U/L / ALT: 49 U/L / AST: 59 U/L / GGT: x                                                  Culture - Blood (collected 31 Mar 2020 13:59)  Source: .Blood  Preliminary Report (01 Apr 2020 14:03):    No growth to date.    Culture - Blood (collected 31 Mar 2020 13:59)  Source: .Blood  Preliminary Report (01 Apr 2020 14:03):    No growth to date.                                                                                           X-Rays                                                                                     ECHO    MEDICATIONS  (STANDING):  azithromycin   Tablet 250 milliGRAM(s) Oral daily  cefTRIAXone   IVPB 2000 milliGRAM(s) IV Intermittent every 24 hours  chlorhexidine 4% Liquid 1 Application(s) Topical <User Schedule>  enoxaparin Injectable 40 milliGRAM(s) SubCutaneous at bedtime  hydroxychloroquine   Oral   hydroxychloroquine 200 milliGRAM(s) Oral every 12 hours  influenza   Vaccine 0.5 milliLiter(s) IntraMuscular once  methylPREDNISolone sodium succinate Injectable 60 milliGRAM(s) IV Push two times a day  pantoprazole    Tablet 40 milliGRAM(s) Oral before breakfast    MEDICATIONS  (PRN):  acetaminophen   Tablet .. 650 milliGRAM(s) Oral every 6 hours PRN Temp greater or equal to 38C (100.4F) Patient is a 59y old  Male who presents with a chief complaint of transfer for Covid19 (02 Apr 2020 14:13)      HPI:  59M from home, lives with family, with no pertinent PMHx or PSHx presents to the ED with complaints of one week of a cough and shortness of breath. Patient reports some associated nausea, vomiting, diarrhea, generalized weakness, and body aches with his symptoms. Patient denies any recent known sick contact, travel, fever, chills, urinary complaints. admitted to floor COVID +, today worsening SOB, oxygenation on NRM, called to evaluate    PAST MEDICAL & SURGICAL HISTORY:  No significant past surgical history      SOCIAL HX:   Smoking   -ve                      ETOH         -ve                   Other  -ve    FAMILY HISTORY:  :  No known cardiovacular family hisotry     ROS:  See HPI     Allergies    No Known Allergies    Intolerances          PHYSICAL EXAM    ICU Vital Signs Last 24 Hrs  T(C): 37.2 (02 Apr 2020 13:21), Max: 39.4 (01 Apr 2020 21:00)  T(F): 98.9 (02 Apr 2020 13:21), Max: 103 (01 Apr 2020 21:00)  HR: 119 (02 Apr 2020 11:38) (81 - 119)  BP: 131/67 (02 Apr 2020 07:45) (110/73 - 131/67)  RR: 36 (02 Apr 2020 11:38) (20 - 36)  SpO2: 88-92% on 100% NRM (02 Apr 2020 15:03) (80% - 98%)      General: speaks in sentences, in mild distress   HEENT:  JUDY              Lungs: Bilateral Crackles  Cardiovascular: tachy  Gastrointestinal: Soft, Positive BS  Musculoskeletal: No clubbing.  Moves all extremities.  Full range of motion   Skin: Warm.  Intact  Neurological: No motor or sensory deficit         LABS:                          13.2   7.81  )-----------( 307      ( 02 Apr 2020 05:06 )             38.8                                               04-02    143  |  105  |  19  ----------------------------<  114<H>  5.0   |  21  |  1.0    Ca    8.9      02 Apr 2020 05:06  Mg     2.4     04-02    TPro  7.1  /  Alb  3.2<L>  /  TBili  0.7  /  DBili  x   /  AST  59<H>  /  ALT  49<H>  /  AlkPhos  70  04-02                                                                                           LIVER FUNCTIONS - ( 02 Apr 2020 05:06 )  Alb: 3.2 g/dL / Pro: 7.1 g/dL / ALK PHOS: 70 U/L / ALT: 49 U/L / AST: 59 U/L / GGT: x                                                  Culture - Blood (collected 31 Mar 2020 13:59)  Source: .Blood  Preliminary Report (01 Apr 2020 14:03):    No growth to date.    Culture - Blood (collected 31 Mar 2020 13:59)  Source: .Blood  Preliminary Report (01 Apr 2020 14:03):    No growth to date.                                                                                           X-Rays   reviewed    MEDICATIONS  (STANDING):  azithromycin   Tablet 250 milliGRAM(s) Oral daily  cefTRIAXone   IVPB 2000 milliGRAM(s) IV Intermittent every 24 hours  chlorhexidine 4% Liquid 1 Application(s) Topical <User Schedule>  enoxaparin Injectable 40 milliGRAM(s) SubCutaneous at bedtime  hydroxychloroquine   Oral   hydroxychloroquine 200 milliGRAM(s) Oral every 12 hours  influenza   Vaccine 0.5 milliLiter(s) IntraMuscular once  methylPREDNISolone sodium succinate Injectable 60 milliGRAM(s) IV Push two times a day  pantoprazole    Tablet 40 milliGRAM(s) Oral before breakfast    MEDICATIONS  (PRN):  acetaminophen   Tablet .. 650 milliGRAM(s) Oral every 6 hours PRN Temp greater or equal to 38C (100.4F)

## 2020-04-02 NOTE — CHART NOTE - NSCHARTNOTEFT_GEN_A_CORE
I called the wife Estrella Zhang [phone], was updated on current plan of care. She was aware of the patient needing oxygen and his oxygenation status not improving on cannula and mask. Wife is informed that medical team would be getting the patient intubated. Wife verbalized understanding, would inform if any updates.

## 2020-04-02 NOTE — AIRWAY PLACEMENT NOTE ADULT - POST AIRWAY PLACEMENT ASSESSMENT:
breath sounds equal/chest excursion noted/positive end tidal CO2 noted/skin color improved/CXR pending/breath sounds bilateral

## 2020-04-02 NOTE — CONSULT NOTE ADULT - ASSESSMENT
Assessment:    Acute hypoxemic respiratory failure secondary to SARS-COV-2  SARS-COV-2 PNA.  Low Threshold for intubation    PLAN:    CNS: Avoid sedation    HEENT:  Oral care    PULMONARY:  HOB @ 45 degrees, FluA/FLUB trend procalcitonin, LDH/CK/ferritin/CRP  Start solumedrol 40q12(monitor FS).     CARDIOVASCULAR: trend trops, NT-probnp, Check QTC.     GI: GI prophylaxis Protonix                                         Feeding: NPO for now    RENAL:  F/u  lytes.  Correct as needed. accurate I/O    INFECTIOUS DISEASE: IV abx   rocephine/azithro, Continue HCQ(monitor Qtc), FluA/FLUB trend procalcitonin, LDH/CK/ferritin/CRP. Check nasal MRSA,urine legionella, urine strept.     HEMATOLOGICAL:  DVT prophylaxis.    ENDOCRINE:  Follow up FS.  Insulin protocol if needed.    MUSCULOSKELETAL: Bed rest for now    CODE STATUS: FULL CODE    DISPOSITION: Patient require continued monitoring in MICU. Assessment:    Acute hypoxemic respiratory failure secondary to SARS-COV-2  SARS-COV-2 PNA worsening status      PLAN:    CNS: Avoid sedation    HEENT:  Oral care    PULMONARY:  HOB @ 45 degrees, trend procalcitonin, LDH/CK/ferritin/CRP, low treshhold for intubation    CARDIOVASCULAR: trend trops, NT-probnp, Check QTC.     GI: GI prophylaxis Protonix                                         Feeding: NPO for now    RENAL:  F/u  lytes.  Correct as needed. accurate I/O    INFECTIOUS DISEASE: IV abx   rocephine/azithro, Continue HCQ(monitor Qtc), FluA/FLUB trend procalcitonin, LDH/CK/ferritin/CRP. Check nasal MRSA,urine legionella, urine strept.     HEMATOLOGICAL:  DVT prophylaxis.    ENDOCRINE:  Follow up FS.  Insulin protocol if needed.    MUSCULOSKELETAL: Bed rest for now    CODE STATUS: FULL CODE    DISPOSITION: Patient require continued monitoring in MICU.  poor prognosis

## 2020-04-02 NOTE — PROGRESS NOTE ADULT - SUBJECTIVE AND OBJECTIVE BOX
SHANNON, AMAYA  59y, Male    All available historical data reviewed    OVERNIGHT EVENTS:  face mask    ROS:  General: Denies rigors, nightsweats  HEENT: Denies headache, rhinorrhea, sore throat, eye pain  CV: Denies CP, palpitations  PULM: SOB, no cough  GI: Denies hematemesis, hematochezia, melena  : Denies discharge, hematuria  MSK: Denies arthralgias, myalgias  SKIN: Denies rash, lesions  NEURO: Denies paresthesias, weakness  PSYCH: Denies depression, anxiety    VITALS:  T(F): 98.2, Max: 103 (04-01-20 @ 21:00)  HR: 103  BP: 118/65  RR: 20Vital Signs Last 24 Hrs  T(C): 36.8 (02 Apr 2020 06:00), Max: 39.4 (01 Apr 2020 21:00)  T(F): 98.2 (02 Apr 2020 06:00), Max: 103 (01 Apr 2020 21:00)  HR: 103 (02 Apr 2020 06:00) (81 - 117)  BP: 118/65 (02 Apr 2020 06:00) (110/73 - 130/88)  BP(mean): --  RR: 20 (02 Apr 2020 06:00) (20 - 22)  SpO2: 98% (02 Apr 2020 06:00) (92% - 98%)    TESTS & MEASUREMENTS:                        13.2   7.81  )-----------( 307      ( 02 Apr 2020 05:06 )             38.8     04-02    143  |  105  |  19  ----------------------------<  114<H>  5.0   |  21  |  1.0    Ca    8.9      02 Apr 2020 05:06  Mg     2.4     04-02    TPro  7.1  /  Alb  3.2<L>  /  TBili  0.7  /  DBili  x   /  AST  59<H>  /  ALT  49<H>  /  AlkPhos  70  04-02    LIVER FUNCTIONS - ( 02 Apr 2020 05:06 )  Alb: 3.2 g/dL / Pro: 7.1 g/dL / ALK PHOS: 70 U/L / ALT: 49 U/L / AST: 59 U/L / GGT: x             Culture - Blood (collected 03-31-20 @ 13:59)  Source: .Blood  Preliminary Report (04-01-20 @ 14:03):    No growth to date.    Culture - Blood (collected 03-31-20 @ 13:59)  Source: .Blood  Preliminary Report (04-01-20 @ 14:03):    No growth to date.            RADIOLOGY & ADDITIONAL TESTS:  Personal review of radiological diagnostics performed  Echo and EKG results noted when applicable.     MEDICATIONS:  acetaminophen   Tablet .. 650 milliGRAM(s) Oral every 6 hours PRN  azithromycin   Tablet 250 milliGRAM(s) Oral daily  chlorhexidine 4% Liquid 1 Application(s) Topical <User Schedule>  enoxaparin Injectable 40 milliGRAM(s) SubCutaneous at bedtime  hydroxychloroquine   Oral   hydroxychloroquine 200 milliGRAM(s) Oral every 12 hours  influenza   Vaccine 0.5 milliLiter(s) IntraMuscular once      ANTIBIOTICS:  azithromycin   Tablet 250 milliGRAM(s) Oral daily  hydroxychloroquine   Oral   hydroxychloroquine 200 milliGRAM(s) Oral every 12 hours

## 2020-04-02 NOTE — CHART NOTE - NSCHARTNOTEFT_GEN_A_CORE
Event monitor placed 4/2 and instructions provided.  Please re-call EP when monitoring is no longer indicated or patient is being discharged.    EP spectra [phone]

## 2020-04-02 NOTE — PROGRESS NOTE ADULT - SUBJECTIVE AND OBJECTIVE BOX
AMAYA ORTEGA 59y Male  MRN#: [id number]   CODE STATUS: FULL      SUBJECTIVE  Patient is a 59y old Male who presents with a chief complaint of SOB (01 Apr 2020 14:31)    Currently admitted to medicine with the primary diagnosis of     Today is hospital day 2d,   INTERVAL HPI/OVERNIGHT EVENTS:    This morning he is laying in bed comfortably .   Denies chest pain, shortness of breath, abdoimal pain, nausea, vomiting or changes in bowel habits.   has no complaints today.     Urinating and stooling appropriately.    Present Today:           Patino Catheter (x)No/ ()Yes?   Indication:             Central Line (x)No/ ()Yes?   Indication:          IV Fluids (x)No/ ()Yes? Type:  Rate:  Indication:    OBJECTIVE  PAST MEDICAL & SURGICAL HISTORY  No significant past surgical history    ALLERGIES:  No Known Allergies    HOME MEDICATIONS:  Home Medications:  Vitamin D3 50,000 intl units (1250 mcg) oral capsule: 1 cap(s) orally once a week (31 Mar 2020 23:18)    MEDICATIONS:  STANDING MEDICATIONS  azithromycin   Tablet 250 milliGRAM(s) Oral daily  chlorhexidine 4% Liquid 1 Application(s) Topical <User Schedule>  enoxaparin Injectable 40 milliGRAM(s) SubCutaneous at bedtime  hydroxychloroquine   Oral   hydroxychloroquine 200 milliGRAM(s) Oral every 12 hours  influenza   Vaccine 0.5 milliLiter(s) IntraMuscular once    PRN MEDICATIONS  acetaminophen   Tablet .. 650 milliGRAM(s) Oral every 6 hours PRN      VITAL SIGNS: Last 24 Hours  T(C): 38.8 (02 Apr 2020 07:45), Max: 39.4 (01 Apr 2020 21:00)  T(F): 101.8 (02 Apr 2020 07:45), Max: 103 (01 Apr 2020 21:00)  HR: 110 (02 Apr 2020 07:45) (81 - 117)  BP: 131/67 (02 Apr 2020 07:45) (110/73 - 131/67)  RR: 26 (02 Apr 2020 07:45) (20 - 26)  SpO2: 91% (02 Apr 2020 07:45) (91% - 98%)    LABS:                        13.2   7.81  )-----------( 307      ( 02 Apr 2020 05:06 )             38.8     04-02    143  |  105  |  19  ----------------------------<  114<H>  5.0   |  21  |  1.0    Ca    8.9      02 Apr 2020 05:06  Mg     2.4     04-02    TPro  7.1  /  Alb  3.2<L>  /  TBili  0.7  /  DBili  x   /  AST  59<H>  /  ALT  49<H>  /  AlkPhos  70  04-02    Culture - Blood (collected 31 Mar 2020 13:59)  Source: .Blood  Preliminary Report (01 Apr 2020 14:03):    No growth to date.    Culture - Blood (collected 31 Mar 2020 13:59)  Source: .Blood  Preliminary Report (01 Apr 2020 14:03):    No growth to date.      RADIOLOGY:          ECHO:      PHYSICAL EXAM:    GENERAL: NAD, well-developed, AAOx3  HEENT:  Atraumatic, Normocephalic. EOMI, PERRLA, conjunctiva and sclera clear, No JVD  PULMONARY: Clear to auscultation bilaterally; No wheeze  CARDIOVASCULAR: Regular rate and rhythm; No murmurs, rubs, or gallops  GASTROINTESTINAL: Soft, Nontender, Nondistended; Bowel sounds present  MUSCULOSKELETAL:  2+ Peripheral Pulses, No clubbing, cyanosis, or edema  NEUROLOGY: non-focal  SKIN: No rashes or lesions    ASSESSMENT & PLAN    #Acute hypoxic respiratory failure likely SARS COV2 pneumonia  - saturating  % on  L O2, febrile,   - CXR on admission:  - Procalcitonin:  CRP:   LDH:     - repeat CXR:   - EKG on admission:  - On plaquaneil and azithro  - ID consulted  - Supportive care: tylenol  pain, antussive, antiemetics  - montior for hypoxia , upgrade to ICU if deteriorates      PT/REHAB   ACTIVITY: Increase as tolerated   DVT PPX:  GI PPX:  DIET:  CODE:  DIsposition: from home;   Pending: AMAYA ORTEGA 59y Male  MRN#: [id number]   CODE STATUS: FULL      SUBJECTIVE  Patient is a 59y old Male who presents with a chief complaint of SOB (01 Apr 2020 14:31)    Currently admitted to medicine with the primary diagnosis of     Today is hospital day 2d,   INTERVAL HPI/OVERNIGHT EVENTS: patient has slight SOB+ using NRM,     Examined patient in full PPE  Present Today:           Patino Catheter (x)No/ ()Yes?   Indication:             Central Line (x)No/ ()Yes?   Indication:          IV Fluids (x)No/ ()Yes? Type:  Rate:  Indication:    OBJECTIVE  PAST MEDICAL & SURGICAL HISTORY  No significant past surgical history    ALLERGIES:  No Known Allergies    HOME MEDICATIONS:  Home Medications:  Vitamin D3 50,000 intl units (1250 mcg) oral capsule: 1 cap(s) orally once a week (31 Mar 2020 23:18)    MEDICATIONS:  STANDING MEDICATIONS  azithromycin   Tablet 250 milliGRAM(s) Oral daily  chlorhexidine 4% Liquid 1 Application(s) Topical <User Schedule>  enoxaparin Injectable 40 milliGRAM(s) SubCutaneous at bedtime  hydroxychloroquine   Oral   hydroxychloroquine 200 milliGRAM(s) Oral every 12 hours  influenza   Vaccine 0.5 milliLiter(s) IntraMuscular once    PRN MEDICATIONS  acetaminophen   Tablet .. 650 milliGRAM(s) Oral every 6 hours PRN      VITAL SIGNS: Last 24 Hours  T(C): 38.8 (02 Apr 2020 07:45), Max: 39.4 (01 Apr 2020 21:00)  T(F): 101.8 (02 Apr 2020 07:45), Max: 103 (01 Apr 2020 21:00)  HR: 110 (02 Apr 2020 07:45) (81 - 117)  BP: 131/67 (02 Apr 2020 07:45) (110/73 - 131/67)  RR: 26 (02 Apr 2020 07:45) (20 - 26)  SpO2: 91% (02 Apr 2020 07:45) (91% - 98%)    LABS:                        13.2   7.81  )-----------( 307      ( 02 Apr 2020 05:06 )             38.8     04-02    143  |  105  |  19  ----------------------------<  114<H>  5.0   |  21  |  1.0    Ca    8.9      02 Apr 2020 05:06  Mg     2.4     04-02    TPro  7.1  /  Alb  3.2<L>  /  TBili  0.7  /  DBili  x   /  AST  59<H>  /  ALT  49<H>  /  AlkPhos  70  04-02    Culture - Blood (collected 31 Mar 2020 13:59)  Source: .Blood  Preliminary Report (01 Apr 2020 14:03):    No growth to date.    Culture - Blood (collected 31 Mar 2020 13:59)  Source: .Blood  Preliminary Report (01 Apr 2020 14:03):    No growth to date.      RADIOLOGY:  Xray Chest 1 View- PORTABLE-Routine (04.02.20 @ 09:24) >  Stable bilateral opacities.     ECHO:  none in this admission    PHYSICAL EXAM: Examined patient in full PPE    GENERAL: NAD, obese, AAOx3  HEENT:  Atraumatic, Normocephalic. EOMI, PERRLA, conjunctiva and sclera clear, No JVD  PULMONARY: Clear to auscultation bilaterally; No wheeze  CARDIOVASCULAR: Regular rate and rhythm; No murmurs, rubs, or gallops  GASTROINTESTINAL: Soft, Nontender, Nondistended; Bowel sounds present  MUSCULOSKELETAL:  2+ Peripheral Pulses, No clubbing, cyanosis, or edema  NEUROLOGY: non-focal  SKIN: No rashes or lesions    ASSESSMENT & PLAN    59M from home, lives with family, with no pertinent PMHx or PSHx presents to the ED with complaints of one week of a cough and shortness of breath.    #Acute hypoxic respiratory failure due to  SARS COV2 pneumonia with high probability of evolving into cytokine response along with bacterial process  - saturating  91% on  15L O2, NRB' ; febrile  - CXR on admission: diffuse patchy opacities  - CXR 4/1: unchanged from prior  - CXR 4/2: stable opacities  - lymphopenia+  - Procalcitonin: 1.53 - indicating bacterial infection as well ; CRP: 32.58 fibrinogen>700  - EKG on admission: 422ms  - BCX negative  - On plaquaneil and azithromycin  - ID consulted, recommended IV ceftriaxone 2g q24h   - Supportive care: tylenol  pain, antussive, antiemetics  - montior for hypoxia , upgrade to ICU if deteriorates  - EP consulted for patch placement for qtc monitoring  - ICU consulted for evaluation    #Transaminitis - trending down  - monitor CMP daily  - trending down    #Diarrhea- resolving  - likely due to COVID  - GI stool, pcr sent    #Obesity  - adviced weight loss  - sent A1c     ACTIVITY: Increase as tolerated (within room)  DVT PPX: lovenox  GI PPX: protonix  DIET: regular  CODE: Full  DIsposition: from home;   Pending: clinical imprvement

## 2020-04-03 NOTE — PROGRESS NOTE ADULT - ASSESSMENT
Assessment:    Acute hypoxemic respiratory failure secondary to SARS-COV-2  SARS-COV-2 PNA worsening status      PLAN:    CNS: continue sedation     HEENT:  Oral care    PULMONARY:  HOB @ 45 degrees, trend procalcitonin, LDH/CK/ferritin/CRP  lower FIo2 to 50%  increase to 24     CARDIOVASCULAR: trend trops, NT-probnp, Check QTC.     GI: GI prophylaxis Protonix                                         Feeding: NG feed     RENAL:  F/u  lytes.  Correct as needed. accurate I/O    INFECTIOUS DISEASE: IV abx   rocephine/azithro, Continue HCQ(monitor Qtc), FluA/FLUB trend procalcitonin, LDH/CK/ferritin/CRP. Check nasal MRSA,urine legionella, urine strept.   on HCQ   HEMATOLOGICAL:  DVT prophylaxis.    ENDOCRINE:  Follow up FS.  Insulin protocol if needed.    MUSCULOSKELETAL: Bed rest for now    CODE STATUS: FULL CODE    DISPOSITION: Patient require continued monitoring in MICU.  poor prognosis

## 2020-04-03 NOTE — PROGRESS NOTE ADULT - SUBJECTIVE AND OBJECTIVE BOX
Patient is a 59y old  Male who presents with a chief complaint of transfer for Covid19 (02 Apr 2020 14:13)      Over Night Events:  Patient seen and examined s/p intubation for worsening hypoxia on sedation     ROS:  See HPI    PHYSICAL EXAM    ICU Vital Signs Last 24 Hrs  T(C): 36.1 (03 Apr 2020 02:00), Max: 39.1 (02 Apr 2020 11:38)  T(F): 97 (03 Apr 2020 02:00), Max: 102.4 (02 Apr 2020 11:38)  HR: 64 (03 Apr 2020 07:08) (64 - 132)  BP: 115/62 (03 Apr 2020 07:08) (88/52 - 167/91)  BP(mean): --  ABP: --  ABP(mean): --  RR: 22 (03 Apr 2020 07:08) (20 - 36)  SpO2: 100% (03 Apr 2020 07:08) (78% - 100%)      General: on sedation   HEENT:  et tube              Lymph Nodes: NO cervical LN   Lungs: Bilateral BS  Cardiovascular: Regular   Abdomen: Soft, Positive BS  Extremities: No clubbing   Skin: warm   Neurological: no focal deficit   Musculoskeletal: move all ext     I&O's Detail    02 Apr 2020 07:01  -  03 Apr 2020 07:00  --------------------------------------------------------  IN:  Total IN: 0 mL    OUT:    Indwelling Catheter - Urethral: 100 mL  Total OUT: 100 mL    Total NET: -100 mL          LABS:                          12.7   11.76 )-----------( 298      ( 03 Apr 2020 08:25 )             39.0         02 Apr 2020 05:06    143    |  105    |  19     ----------------------------<  114    5.0     |  21     |  1.0      Ca    8.9        02 Apr 2020 05:06  Mg     2.4       02 Apr 2020 05:06                                                                                          Lactate, Blood: 1.6 mmol/L (03-31-20 @ 11:08)                                                          Culture - Blood (collected 31 Mar 2020 13:59)  Source: .Blood  Preliminary Report (01 Apr 2020 14:03):    No growth to date.    Culture - Blood (collected 31 Mar 2020 13:59)  Source: .Blood  Preliminary Report (01 Apr 2020 14:03):    No growth to date.                                                   Mode: PCV  RR (machine): 20  FiO2: 100  PEEP: 10  ITime: 1  PC: 30  PIP: 30                                      ABG - ( 03 Apr 2020 08:08 )  pH, Arterial: 7.30  pH, Blood: x     /  pCO2: 44    /  pO2: 322   / HCO3: 21    / Base Excess: -4.9  /  SaO2: 100                 MEDICATIONS  (STANDING):  azithromycin   Tablet 250 milliGRAM(s) Oral daily  cefTRIAXone   IVPB 2000 milliGRAM(s) IV Intermittent every 24 hours  chlorhexidine 0.12% Liquid 15 milliLiter(s) Oral Mucosa every 12 hours  chlorhexidine 4% Liquid 1 Application(s) Topical <User Schedule>  enoxaparin Injectable 40 milliGRAM(s) SubCutaneous at bedtime  hydroxychloroquine   Oral   hydroxychloroquine 200 milliGRAM(s) Oral every 12 hours  influenza   Vaccine 0.5 milliLiter(s) IntraMuscular once  methylPREDNISolone sodium succinate Injectable 60 milliGRAM(s) IV Push two times a day  midazolam Infusion 0.05 mG/kG/Hr (3.77 mL/Hr) IV Continuous <Continuous>  norepinephrine Infusion 0.05 MICROgram(s)/kG/Min (7.07 mL/Hr) IV Continuous <Continuous>  pantoprazole    Tablet 40 milliGRAM(s) Oral before breakfast  propofol Infusion 5 MICROgram(s)/kG/Min (2.26 mL/Hr) IV Continuous <Continuous>    MEDICATIONS  (PRN):  acetaminophen   Tablet .. 650 milliGRAM(s) Oral every 6 hours PRN Temp greater or equal to 38C (100.4F)          Xrays:  TLC:  OG:  ET tube:                                                                                    mild b/l opacity     ECHO:  [facility] ICU:    I

## 2020-04-03 NOTE — PROGRESS NOTE ADULT - ASSESSMENT
59y old Male who presents with a chief complaint of ARF (03 Apr 2020 09:59)    Currently admitted to medicine with the primary diagnosis of acute hypoxic respiratory failure. Patient was intubated yesterday for respiratory distress.  Today is hospital day 3d. This morning he is resting intubated in bed and reports no new issues or overnight events. He is sedated on propofol and versed and receiving levophed for blood pressure support. ABG this am showed pH 7.3 , PCO2 44, PO2 322, HCO3 21, lowered FIo2 to 50%.    COVID 19 with Hypoxemia, CXR with b/l opacities, cytokine response leading to resp failure and MV  -INTUBATED sedated, started on hydroxychloroquine qtc 418  -ABG this am showed pH 7.3 , PCO2 44, PO2 322, HCO3 21, lowered FIo2 to 50%.  -On levophed 0.1 for pressure support  -procal of 1.5 suggestive of a bacterial coinfection, on ceftriaxone and azithromycin, trend procalcitonin ferritin and ldh, f/u am labs, troponins  -fibrinogen assay more than 700, D-dimers more than 50k, rising creatinine in setting of shock  -BCx NG  -f/u nasal mrsa, urine strep and legionella     #Transaminitis  -down trending, f/u am cmp    #Diarrhea  -resolving, will sent stool pcr if there is a bowel movement    #Obesity  -high TGsl, low LDL  -Hba1c pending    CODE: FULL  DIET: NPO WITH TUBE FEED  DVT PROPHYLAXIS  FAMILY UPDATED

## 2020-04-03 NOTE — PROGRESS NOTE ADULT - SUBJECTIVE AND OBJECTIVE BOX
SUBJECTIVE:    Patient is a 59y old Male who presents with a chief complaint of ARF (03 Apr 2020 09:59)    Currently admitted to medicine with the primary diagnosis of acute hypoxic respiratory failure. Patient was intubated yesterday for respiratory distress.  Today is hospital day 3d. This morning he is resting intubated in bed and reports no new issues or overnight events. He is sedated on propofol and versed and receiving levophed for blood pressure support. ABG this am showed pH 7.3 , PCO2 44, PO2 322, HCO3 21, lowered FIo2 to 50%.    PAST MEDICAL & SURGICAL HISTORY  No significant past surgical history    SOCIAL HISTORY:    ALLERGIES:  No Known Allergies    MEDICATIONS:  STANDING MEDICATIONS  azithromycin   Tablet 250 milliGRAM(s) Oral daily  cefTRIAXone   IVPB 2000 milliGRAM(s) IV Intermittent every 24 hours  chlorhexidine 0.12% Liquid 15 milliLiter(s) Oral Mucosa every 12 hours  chlorhexidine 4% Liquid 1 Application(s) Topical <User Schedule>  enoxaparin Injectable 40 milliGRAM(s) SubCutaneous at bedtime  hydroxychloroquine   Oral   hydroxychloroquine 200 milliGRAM(s) Oral every 12 hours  influenza   Vaccine 0.5 milliLiter(s) IntraMuscular once  methylPREDNISolone sodium succinate Injectable 60 milliGRAM(s) IV Push two times a day  midazolam Infusion 0.05 mG/kG/Hr IV Continuous <Continuous>  norepinephrine Infusion 0.05 MICROgram(s)/kG/Min IV Continuous <Continuous>  pantoprazole    Tablet 40 milliGRAM(s) Oral before breakfast  propofol Infusion 5 MICROgram(s)/kG/Min IV Continuous <Continuous>    PRN MEDICATIONS  acetaminophen   Tablet .. 650 milliGRAM(s) Oral every 6 hours PRN    VITALS:   T(F): 95  HR: 60  BP: 110/66  RR: 20  SpO2: 100%    LABS:                        12.7   11.76 )-----------( 298      ( 03 Apr 2020 08:25 )             39.0     04-03    144  |  105  |  41<H>  ----------------------------<  246<H>  4.5   |  18  |  2.0<H>    Ca    8.9      03 Apr 2020 08:25  Mg     2.8     04-03    TPro  7.3  /  Alb  3.0<L>  /  TBili  0.7  /  DBili  x   /  AST  46<H>  /  ALT  46<H>  /  AlkPhos  77  04-03        ABG - ( 03 Apr 2020 08:08 )  pH, Arterial: 7.30  pH, Blood: x     /  pCO2: 44    /  pO2: 322   / HCO3: 21    / Base Excess: -4.9  /  SaO2: 100               Sedimentation Rate, Erythrocyte: 126 mm/Hr <H> (04-03-20 @ 08:25)      Culture - Blood (collected 31 Mar 2020 13:59)  Source: .Blood  Preliminary Report (01 Apr 2020 14:03):    No growth to date.    Culture - Blood (collected 31 Mar 2020 13:59)  Source: .Blood  Preliminary Report (01 Apr 2020 14:03):    No growth to date.          Serum Pro-Brain Natriuretic Peptide: 375 pg/mL (04-03-20 @ 08:25)  Troponin I, Serum: <0.015 ng/mL (03-31-20 @ 11:08)  Serum Pro-Brain Natriuretic Peptide: 161 pg/mL (03-31-20 @ 11:08)      RADIOLOGY:    PHYSICAL EXAM:  GENERAL: NAD,ET +VE  HEAD:  NCAT  NECK: Supple, No JVD, Normal thyroid  NERVOUS SYSTEM: AAOXO, NAD,ET +VE  CHEST/LUNG: DECTREASED BREATH SOUNDS  HEART: +s1s2 RRR no m/g/r  ABDOMEN: soft, NT/ND (+) bs, no HSM  EXTREMITIES:  2+ Peripheral Pulses, No c/c/e

## 2020-04-04 NOTE — DIETITIAN INITIAL EVALUATION ADULT. - OTHER INFO
Pt adm for SOB. COVID 19 with Hypoxemia, CXR with b/l opacities, cytokine response leading to resp failure and MV. Transaminitis-down trending. TAMIKA-Cr worsening. Hyperglycemia - insulin ordered today.

## 2020-04-04 NOTE — DIETITIAN INITIAL EVALUATION ADULT. - FACTORS AFF FOOD INTAKE
Intubated/sedated. On OGT feeds. Unsure if pt is receiving/tolerating TF- unable to reach RN via phone. Unable to obtain nutrition hx at this time.

## 2020-04-04 NOTE — PROGRESS NOTE ADULT - SUBJECTIVE AND OBJECTIVE BOX
CTU Attending Progress Daily Note     04 Apr 2020 12:48  Admited 03-31-20, Hospital Day 4d  HPI: - SOB    Home Medications:  Vitamin D3 50,000 intl units (1250 mcg) oral capsule: 1 cap(s) orally once a week (31 Mar 2020 23:18)    FAMILY HISTORY:    PAST MEDICAL & SURGICAL HISTORY:  No significant past surgical history    Interval event for past 24 hr:  AMAYA ORTEGA  59y had no event. - remains intubated   Current Complains:  AMAYA ORTEGA has no new complains  Allergies    No Known Allergies    Intolerances      OBJECTIVE:  Vitals last 24 hrs  T(C): 34.1 (04-04-20 @ 04:00), Max: 34.9 (04-03-20 @ 17:00)  T(F): 93.4 (04-04-20 @ 04:00), Max: 94.8 (04-03-20 @ 17:00)  HR: 58 (04-04-20 @ 07:30) (55 - 606)  BP: 151/71 (04-04-20 @ 07:30) (116/62 - 151/71)  ABP: --  ABP(mean): --  RR: 24 (04-04-20 @ 07:30) (20 - 28)  SpO2: 97% (04-04-20 @ 09:48) (95% - 99%)  CVP(mm Hg): --  CI: --  PA: --  PA(direct): --  PCWP: --  SVR: --  PVR: --    04-03-20 @ 07:01  -  04-04-20 @ 07:00  --------------------------------------------------------  IN:  Total IN: 0 mL    OUT:    Indwelling Catheter - Urethral: 425 mL  Total OUT: 425 mL    Total NET: -425 mL        Mode: AC/ CMV (Assist Control/ Continuous Mandatory Ventilation)  RR (machine): 24  FiO2: 50  PEEP: 10  ITime: 1  PC: 30  PIP: 30    CAPILLARY BLOOD GLUCOSE      POCT Blood Glucose.: 476 mg/dL (04 Apr 2020 11:47)    LABS:  ABG - ( 04 Apr 2020 07:36 )  pH, Arterial: 7.32  pH, Blood: x     /  pCO2: 38    /  pO2: 125   / HCO3: 20    / Base Excess: -5.9  /  SaO2: 98              Blood Gas Arterial, Lactate: 2.0 mmoL/L <H> (04-04-20 @ 07:36)                          12.9   12.92 )-----------( 360      ( 04 Apr 2020 07:43 )             38.7     Hemoglobin: 12.9 g/dL (04-04 @ 07:43)  Hemoglobin: 12.7 g/dL (04-03 @ 08:25)  Hemoglobin: 13.2 g/dL (04-02 @ 05:06)    04-04    141  |  104  |  66<HH>  ----------------------------<  515<HH>  4.4   |  15<L>  |  3.1<H>    Ca    8.8      04 Apr 2020 07:43  Mg     2.8     04-03    TPro  6.9  /  Alb  3.0<L>  /  TBili  0.5  /  DBili  x   /  AST  25  /  ALT  36  /  AlkPhos  74  04-04    Creatinine, Serum: 3.1 mg/dL (04-04 @ 07:43)  Creatinine, Serum: 2.0 mg/dL (04-03 @ 08:25)  Creatinine, Serum: 1.0 mg/dL (04-02 @ 05:06)  Creatinine, Serum: 1.0 mg/dL (04-01 @ 05:42)          HOSPITAL MEDICATIONS:  MEDICATIONS  (STANDING):  anakinra Injectable 100 milliGRAM(s) SubCutaneous every 6 hours  azithromycin   Tablet 250 milliGRAM(s) Oral daily  cefTRIAXone   IVPB 2000 milliGRAM(s) IV Intermittent every 24 hours  chlorhexidine 0.12% Liquid 15 milliLiter(s) Oral Mucosa every 12 hours  chlorhexidine 4% Liquid 1 Application(s) Topical <User Schedule>  dextrose 5%. 1000 milliLiter(s) (50 mL/Hr) IV Continuous <Continuous>  dextrose 50% Injectable 12.5 Gram(s) IV Push once  dextrose 50% Injectable 25 Gram(s) IV Push once  dextrose 50% Injectable 25 Gram(s) IV Push once  heparin  Injectable 5000 Unit(s) SubCutaneous every 8 hours  hydroxychloroquine   Oral   hydroxychloroquine 200 milliGRAM(s) Oral every 12 hours  influenza   Vaccine 0.5 milliLiter(s) IntraMuscular once  insulin glargine Injectable (LANTUS) 20 Unit(s) SubCutaneous at bedtime  insulin lispro (HumaLOG) corrective regimen sliding scale   SubCutaneous three times a day before meals  insulin lispro Injectable (HumaLOG) 7 Unit(s) SubCutaneous three times a day before meals  lactated ringers. 1000 milliLiter(s) (75 mL/Hr) IV Continuous <Continuous>  methylPREDNISolone sodium succinate Injectable 60 milliGRAM(s) IV Push two times a day  midazolam Infusion 0.05 mG/kG/Hr (3.77 mL/Hr) IV Continuous <Continuous>  norepinephrine Infusion 0.05 MICROgram(s)/kG/Min (7.07 mL/Hr) IV Continuous <Continuous>  propofol Infusion 5 MICROgram(s)/kG/Min (2.26 mL/Hr) IV Continuous <Continuous>  vancomycin  IVPB 1000 milliGRAM(s) IV Intermittent every 24 hours    MEDICATIONS  (PRN):  acetaminophen   Tablet .. 650 milliGRAM(s) Oral every 6 hours PRN Temp greater or equal to 38C (100.4F)  dextrose 40% Gel 15 Gram(s) Oral once PRN Blood Glucose LESS THAN 70 milliGRAM(s)/deciliter  glucagon  Injectable 1 milliGRAM(s) IntraMuscular once PRN Glucose LESS THAN 70 milligrams/deciliter      REVIEW OF SYSTEMS:    [ x ] Unable to assess ROS because sedation     PHYSICAL EXAM:          CONSTITUTIONAL: Well-developed; well-nourished; in no acute distress.   	SKIN: warm, dry, no rashes or lesions  	HEENT: Atraumatic. Normocephalic. PERRL. Moist membranes, no conjunctival injection, sclera clear  	NECK: Supple; non tender.  No JVD. No lymphadenopathy.  	CARD: Normal S1, S2. Rate and Rhythm are regular. No murmurs.  	RESP: Good air entry bilaterally, no wheezes, +rales no rhonchi.  	ABD: Soft, not tender, not distended, no CVA tendernass, no rebound no guarding, bowel sounds present  	EXT: Normal ROM.  No clubbing, no cyanosis, no pedal edema, no calf pain b/l, Peripheral pulses intact.  	LYMPH: No acute cervical adenopathy.  	NEURO: Alert, awake, motor 5/5 R, 5/5 L, sensation intact bilat, CN 2-12 intact,          PSYCH: Cooperative, appropriate. Alert & oriented x 3    RADIOLOGY:  X Reviewed and interpreted by me  CxR from 04-04-20 shows bilateral interstitial infitrates, no pneumothorax, no effusion, no cardiomegaly, - no change from yesterday  ETT above sudheer in good position, NGT in place,

## 2020-04-04 NOTE — DIETITIAN INITIAL EVALUATION ADULT. - ENERGY NEEDS
CALORIES: ~1405 kcals/day ([id number])  PROTEIN: 90 - 113 gms/day (1.2 - 1.5 gm/kg CBW - will aim for lower end given TAMIKA)  FLUID: per LIP

## 2020-04-04 NOTE — DIETITIAN INITIAL EVALUATION ADULT. - PERTINENT MEDS FT
heparin. abx, LR, humalog, lantus, solumedrol, versed, norepineprhine, propofol (active in meds but intake not documented in flowsheets)

## 2020-04-04 NOTE — PROGRESS NOTE ADULT - SUBJECTIVE AND OBJECTIVE BOX
SUBJECTIVE:    Patient is a 59y old Male who presents with a chief complaint of cough and shortness of breath (03 Apr 2020 10:51)    Currently admitted to medicine with the primary diagnosis of  pneumonia due to covid 19.  Today is hospital day 4d. This morning he is resting intubated in bed and reports no new issues or overnight events. He is sedated on propofol and versed. He has been on levophed for pressure support which will be titrated down. ESR, WBC still high, AG 22, worsening TAMIKA with an improving abg.    PAST MEDICAL & SURGICAL HISTORY  No significant past surgical history    SOCIAL HISTORY:    ALLERGIES:  No Known Allergies    MEDICATIONS:  STANDING MEDICATIONS  azithromycin   Tablet 250 milliGRAM(s) Oral daily  cefTRIAXone   IVPB 2000 milliGRAM(s) IV Intermittent every 24 hours  chlorhexidine 0.12% Liquid 15 milliLiter(s) Oral Mucosa every 12 hours  chlorhexidine 4% Liquid 1 Application(s) Topical <User Schedule>  enoxaparin Injectable 40 milliGRAM(s) SubCutaneous at bedtime  hydroxychloroquine   Oral   hydroxychloroquine 200 milliGRAM(s) Oral every 12 hours  influenza   Vaccine 0.5 milliLiter(s) IntraMuscular once  lactated ringers. 1000 milliLiter(s) IV Continuous <Continuous>  methylPREDNISolone sodium succinate Injectable 60 milliGRAM(s) IV Push two times a day  midazolam Infusion 0.05 mG/kG/Hr IV Continuous <Continuous>  norepinephrine Infusion 0.05 MICROgram(s)/kG/Min IV Continuous <Continuous>  propofol Infusion 5 MICROgram(s)/kG/Min IV Continuous <Continuous>  vancomycin  IVPB 1000 milliGRAM(s) IV Intermittent every 24 hours    PRN MEDICATIONS  acetaminophen   Tablet .. 650 milliGRAM(s) Oral every 6 hours PRN    VITALS:   T(F): 93.4  HR: 58  BP: 151/71  RR: 24  SpO2: 97%    LABS:                        12.9   12.92 )-----------( 360      ( 04 Apr 2020 07:43 )             38.7     04-04    141  |  104  |  66<HH>  ----------------------------<  515<HH>  4.4   |  15<L>  |  3.1<H>    Ca    8.8      04 Apr 2020 07:43  Mg     2.8     04-03    TPro  6.9  /  Alb  3.0<L>  /  TBili  0.5  /  DBili  x   /  AST  25  /  ALT  36  /  AlkPhos  74  04-04        ABG - ( 04 Apr 2020 07:36 )  pH, Arterial: 7.32  pH, Blood: x     /  pCO2: 38    /  pO2: 125   / HCO3: 20    / Base Excess: -5.9  /  SaO2: 98                Sedimentation Rate, Erythrocyte: 115 mm/Hr <H> (04-04-20 @ 07:43)  Troponin T, Serum: <0.01 ng/mL (04-04-20 @ 07:43)  Troponin T, Serum: <0.01 ng/mL (04-03-20 @ 17:13)      CARDIAC MARKERS ( 04 Apr 2020 07:43 )  x     / <0.01 ng/mL / x     / x     / x      CARDIAC MARKERS ( 03 Apr 2020 17:13 )  x     / <0.01 ng/mL / x     / x     / x          Troponin T, Serum: <0.01 ng/mL (04-04-20 @ 07:43)  Troponin T, Serum: <0.01 ng/mL (04-03-20 @ 17:13)  Serum Pro-Brain Natriuretic Peptide: 375 pg/mL (04-03-20 @ 08:25)      RADIOLOGY:    PHYSICAL EXAM:  GENERAL: NAD,ET +VE  HEAD:  NCAT  NECK: Supple, No JVD, Normal thyroid  NERVOUS SYSTEM: AAOXO, NAD,ET +VE  CHEST/LUNG: DECTREASED BREATH SOUNDS  HEART: +s1s2 RRR no m/g/r  ABDOMEN: soft, NT/ND (+) bs, no HSM  EXTREMITIES:  2+ Peripheral Pulses, No c/c/e

## 2020-04-04 NOTE — PROGRESS NOTE ADULT - ASSESSMENT
PROBLEMS:  I spent 35 minutes of critical care time examining patient, reviewing vitals, labs, medications, imaging and discussing with the team goals of care to prevent life-threatening in this patient who is at high risk for deterioration or death due to:    1.	COVID + pneumonia. Drop FIO2 to 40 %, weaning trials - continue Plaquenil   2.	rising WBC, elevated procalcitonin, hypotermia - continue current Abx  3.	TAMIKA - due to shock  4.	Shock - on Levophed- will consider IV fluids  5.	TSH low - will get TFT    PLAN  Neuro: move all 4 extremities. no sensory or motor deficits  Pain management.   acetaminophen   Tablet .. 650 milliGRAM(s) Oral every 6 hours PRN  midazolam Infusion 0.05 mG/kG/Hr IV Continuous <Continuous>  propofol Infusion 5 MICROgram(s)/kG/Min IV Continuous <Continuous>    Pulm: Wean off supplemental oxygen as able. Daily CXR. Encourage coughing, deep breathing and use of incentive spirometry.     Cardio: Monitor telemetry/alarms. Continue supportive care   norepinephrine Infusion 0.05 MICROgram(s)/kG/Min IV Continuous <Continuous>    GI: Continue stool softeners.      Nutrition: Continue present diet  Endocrine and glucose control:   dextrose 40% Gel 15 Gram(s) Oral once PRN  dextrose 50% Injectable 12.5 Gram(s) IV Push once  dextrose 50% Injectable 25 Gram(s) IV Push once  dextrose 50% Injectable 25 Gram(s) IV Push once  glucagon  Injectable 1 milliGRAM(s) IntraMuscular once PRN  insulin glargine Injectable (LANTUS) 20 Unit(s) SubCutaneous at bedtime  insulin lispro (HumaLOG) corrective regimen sliding scale   SubCutaneous three times a day before meals  insulin lispro Injectable (HumaLOG) 7 Unit(s) SubCutaneous three times a day before meals  methylPREDNISolone sodium succinate Injectable 60 milliGRAM(s) IV Push two times a day    Renal: monitor urine output, supplement electrolytes as needed,     Vasc: Heparin SC and/or SCDs for DVT prophylaxis  heparin  Injectable 5000 Unit(s) SubCutaneous every 8 hours    ID: Stable, no fever , no chills.  azithromycin   Tablet 250 milliGRAM(s) Oral daily  cefTRIAXone   IVPB 2000 milliGRAM(s) IV Intermittent every 24 hours  hydroxychloroquine   Oral   hydroxychloroquine 200 milliGRAM(s) Oral every 12 hours  vancomycin  IVPB 1000 milliGRAM(s) IV Intermittent every 24 hours    Pertinent clinical, laboratory, radiographic, hemodynamic, echocardiographic, respiratory data, microbiologic data and chart were reviewed and analyzed frequently throughout the course of the day and night. GI and DVT prophylaxis, glycemic control, head of bed elevation and skin care issues were addressed.  Patient seen, examined and plan discussed with team    [x ] The patient remains in critical and unstable condition, and requires ICU care and monitoring  [ ] The patient is improving but requires continued monitoring and adjustment of therapy

## 2020-04-04 NOTE — PROGRESS NOTE ADULT - ASSESSMENT
59y old Male who presents with a chief complaint of cough and shortness of breath (03 Apr 2020 10:51)    Currently admitted to medicine with the primary diagnosis of  pneumonia due to covid 19.  Today is hospital day 4d. This morning he is resting intubated in bed and reports no new issues or overnight events. He is sedated on propofol and versed. He has been on levophed for pressure support which will be titrated down. ESR, WBC still high, AG 22, worsening TAMIKA with an improving abg.  Warming blanket ordered for hypothermia.      COVID 19 with Hypoxemia, CXR with b/l opacities, cytokine response leading to resp failure and MV  -INTUBATED sedated, started on hydroxychloroquine qtc 418  -ABG this am showed pH 7.3 , PCO2 38, PO2 125, HCO3 20, lowered FIo2 to 50%.  -On levophed 0.1 for pressure support, will wean off today  -procal of 1.5 suggestive of a bacterial coinfection, on ceftriaxone and azithromycin, trend procalcitonin ferritin and ldh 643, f/u am labs, troponin NG  -fibrinogen assay more than 700, D-dimers more than 50k, rising creatinine in setting of shock  -BCx NG  -f/u nasal mrsa, urine strep and legionella   -Hypothermia this am, warming blanket ordered, follow up TSH, T3, T4 in am  -High leukocytes, vancomycin renally dosed added, follow up trough in am      #Transaminitis  -down trending, f/u am cmp    #TAMIKA  -Cr worsening, lovenox changed to heparin    #Hyperglycemia on BMP,Obesity  -high TGsl, low LDL  -Hba1c 6.2  -basal, bolus regimen added with sliding scale and strict finger sticks    CODE: FULL  DIET: NPO WITH TUBE FEED  DVT PROPHYLAXIS  FAMILY UPDATED by communication team

## 2020-04-04 NOTE — DIETITIAN INITIAL EVALUATION ADULT. - ADD RECOMMEND
Change feeds to low carb formula - Peptamen AF @ 270ml q6hr (provides 1296 kcals, 86 gms protein, 875 ml free water) Flushes per LIP.

## 2020-04-05 NOTE — PROGRESS NOTE ADULT - SUBJECTIVE AND OBJECTIVE BOX
59 M from home, lives with family, with no pertinent PMHx or PSHx presents to the ED with complaints of one week of a cough and shortness of breath. Patient reports some associated nausea, vomiting, diarrhea, generalized weakness, and body aches with his symptoms. Patient denies any recent known sick contact, travel, fever, chills, urinary complaint.    Intubated on 4/2 - PC, PEEP 10, FiO2 35%  Sedated - Propofol, Versed  COVID + 3/31    Vital Signs Last 24 Hrs  T(C): 35.5 (05 Apr 2020 00:00), Max: 35.5 (05 Apr 2020 00:00)  T(F): 95.9 (05 Apr 2020 00:00), Max: 95.9 (05 Apr 2020 00:00)  HR: 58 (05 Apr 2020 10:00) (54 - 78)  BP: 110/68 (05 Apr 2020 00:00) (109/68 - 115/64)  RR: 24 (05 Apr 2020 00:00) (24 - 24)  SpO2: 99% (05 Apr 2020 10:00) (98% - 99%)    Sedated  S1S2 reg rate  soft abdomen, non distended    7.4/29/128 - on above settings  WBC 11.7  K 5.6 (hemolyzed), BUN 88, Cr 3.1  AST 54/ALT 49  Vavnco randmom 24    CXR - ET tube and NGT in place, b/l air space disease    a/p:    SARs-CoV2 + with ARDS - Acute Hypoxic Resp failure  TAMIKA    Stop Versed, wean off propofol  Wean vent to PS  repeat labs today  NS @ 75 ml/hr  Hold Vanco IV today, repeat level in am  Cont with Ceftx, Zithro and HCQ  Anakinra for 3 days SC TID  IV methylpred   NGT feeds with glycemic control  DVT and GI proph      45 minutes of critical care time spent providing medical care for patient's acute illness/conditions that impairs at least one vital organ system and/or poses a high risk of imminent or life threatening deterioration in the patient's condition. It includes time spent evaluating and treating the patient's acute illness as well as time spent reviewing labs, radiology, discussing goals of care with patient and/or patient's family, and discussing the case with a multidisciplinary team in an effort to prevent further life threatening deterioration or end organ damage. This time is independent of any procedures performed.

## 2020-04-06 NOTE — PROGRESS NOTE ADULT - SUBJECTIVE AND OBJECTIVE BOX
Patient is a 59y old  Male who presents with a chief complaint of Transferred from [facility] where he presented with Cough and Shortness of breath (05 Apr 2020 15:03)      Over Night Events:  Patient seen and examined still vented on sedation     ROS:  See HPI    PHYSICAL EXAM    ICU Vital Signs Last 24 Hrs  T(C): 35.7 (06 Apr 2020 08:13), Max: 37.1 (06 Apr 2020 04:00)  T(F): 96.3 (06 Apr 2020 08:13), Max: 98.7 (06 Apr 2020 04:00)  HR: 80 (06 Apr 2020 08:13) (58 - 128)  BP: 108/66 (06 Apr 2020 10:00) (108/66 - 187/90)  BP(mean): --  ABP: --  ABP(mean): --  RR: 20 (06 Apr 2020 08:13) (20 - 24)  SpO2: 100% (06 Apr 2020 08:13) (94% - 100%)      General: on sedation   HEENT:     et tube            Lymph Nodes: NO cervical LN   Lungs: Bilateral BS  Cardiovascular: Regular   Abdomen: Soft, Positive BS  Extremities: No clubbing   Skin: warm   Neurological:  no focal deficit   Musculoskeletal: move all ext     I&O's Detail    05 Apr 2020 07:01  -  06 Apr 2020 07:00  --------------------------------------------------------  IN:    Osmolite: 240 mL    sodium chloride 0.9%: 450 mL  Total IN: 690 mL    OUT:    Indwelling Catheter - Urethral: 2000 mL  Total OUT: 2000 mL    Total NET: -1310 mL          LABS:                          13.3   11.72 )-----------( 260      ( 05 Apr 2020 13:08 )             38.7         05 Apr 2020 13:08    143    |  110    |  88     ----------------------------<  212    5.6     |  19     |  3.1      Ca    8.7        05 Apr 2020 13:08    TPro  6.4    /  Alb  2.5    /  TBili  0.3    /  DBili  x      /  AST  54     /  ALT  49     /  AlkPhos  71     05 Apr 2020 13:08  Amylase x     lipase x                                                 PT/INR - ( 05 Apr 2020 13:08 )   PT: 12.90 sec;   INR: 1.12 ratio         PTT - ( 05 Apr 2020 13:08 )  PTT:25.6 sec                                                                                                                                                Mode: AC/ CMV (Assist Control/ Continuous Mandatory Ventilation)  RR (machine): 20  FiO2: 35  Epap : 15  IPAP 30                                      ABG - ( 06 Apr 2020 07:48 )  pH, Arterial: 7.32  pH, Blood: x     /  pCO2: 39    /  pO2: 234   / HCO3: 20    / Base Excess: -5.7  /  SaO2: 99                  MEDICATIONS  (STANDING):  anakinra Injectable 100 milliGRAM(s) SubCutaneous every 6 hours  cefTRIAXone   IVPB 2000 milliGRAM(s) IV Intermittent every 24 hours  chlorhexidine 0.12% Liquid 15 milliLiter(s) Oral Mucosa every 12 hours  chlorhexidine 4% Liquid 1 Application(s) Topical <User Schedule>  dextrose 5%. 1000 milliLiter(s) (50 mL/Hr) IV Continuous <Continuous>  dextrose 50% Injectable 12.5 Gram(s) IV Push once  dextrose 50% Injectable 25 Gram(s) IV Push once  dextrose 50% Injectable 25 Gram(s) IV Push once  heparin  Injectable 5000 Unit(s) SubCutaneous every 8 hours  influenza   Vaccine 0.5 milliLiter(s) IntraMuscular once  insulin glargine Injectable (LANTUS) 30 Unit(s) SubCutaneous at bedtime  insulin lispro (HumaLOG) corrective regimen sliding scale   SubCutaneous three times a day before meals  insulin lispro Injectable (HumaLOG) 10 Unit(s) SubCutaneous every 6 hours  methylPREDNISolone sodium succinate Injectable 60 milliGRAM(s) IV Push two times a day  midazolam Infusion 0.05 mG/kG/Hr (3.77 mL/Hr) IV Continuous <Continuous>  propofol Infusion 5 MICROgram(s)/kG/Min (2.26 mL/Hr) IV Continuous <Continuous>  sodium chloride 0.45%. 1000 milliLiter(s) (75 mL/Hr) IV Continuous <Continuous>  vancomycin  IVPB 750 milliGRAM(s) IV Intermittent every 24 hours    MEDICATIONS  (PRN):  acetaminophen   Tablet .. 650 milliGRAM(s) Oral every 6 hours PRN Temp greater or equal to 38C (100.4F)  dextrose 40% Gel 15 Gram(s) Oral once PRN Blood Glucose LESS THAN 70 milliGRAM(s)/deciliter  glucagon  Injectable 1 milliGRAM(s) IntraMuscular once PRN Glucose LESS THAN 70 milligrams/deciliter          Xrays:  TLC:  OG:  ET tube:                                                                                    b/l  opacity    ECHO:  CAM ICU:

## 2020-04-06 NOTE — PROGRESS NOTE ADULT - ASSESSMENT
Assessment:    Acute hypoxemic respiratory failure secondary to SARS-COV-2  SARS-COV-2 PNA worsening status      PLAN:    CNS: continue sedation     HEENT:  Oral care    PULMONARY:  HOB @ 45 degrees, trend procalcitonin, LDH/CK/ferritin/CRP  lower FIo2 to35  lower Ipap to 20   Epap 10   pulling good TV   ABG in 2 hrs if remain stable will do weaning trial      CARDIOVASCULAR: trend trops, NT-probnp, Check QTC.     GI: GI prophylaxis Protonix                                         Feeding: NG feed     RENAL:  F/u  lytes.  Correct as needed. accurate I/O  IV fluid   INFECTIOUS DISEASE on abx    on HCQ   HEMATOLOGICAL:  DVT prophylaxis.    ENDOCRINE:  Follow up FS.  Insulin protocol if needed.    MUSCULOSKELETAL: Bed rest for now    CODE STATUS: FULL CODE    DISPOSITION: Patient require continued monitoring in MICU.  poor prognosis

## 2020-04-06 NOTE — PROGRESS NOTE ADULT - SUBJECTIVE AND OBJECTIVE BOX
SUBJECTIVE:    Patient is a 59y old Male who presents with a chief complaint of Transferred from [facility] where he presented with Cough and Shortness of breath (05 Apr 2020 15:03)    Currently admitted to medicine with the primary diagnosis of    Today is hospital day 6d. intubated, sedated   INTERVAL EVENTS: afebrile,     PAST MEDICAL & SURGICAL HISTORY  No significant past surgical history      ALLERGIES:  No Known Allergies    MEDICATIONS:  STANDING MEDICATIONS  anakinra Injectable 100 milliGRAM(s) SubCutaneous every 6 hours  cefTRIAXone   IVPB 2000 milliGRAM(s) IV Intermittent every 24 hours  chlorhexidine 0.12% Liquid 15 milliLiter(s) Oral Mucosa every 12 hours  chlorhexidine 4% Liquid 1 Application(s) Topical <User Schedule>  dextrose 5%. 1000 milliLiter(s) IV Continuous <Continuous>  dextrose 50% Injectable 12.5 Gram(s) IV Push once  dextrose 50% Injectable 25 Gram(s) IV Push once  dextrose 50% Injectable 25 Gram(s) IV Push once  heparin  Injectable 5000 Unit(s) SubCutaneous every 8 hours  influenza   Vaccine 0.5 milliLiter(s) IntraMuscular once  insulin glargine Injectable (LANTUS) 30 Unit(s) SubCutaneous at bedtime  insulin lispro (HumaLOG) corrective regimen sliding scale   SubCutaneous three times a day before meals  insulin lispro Injectable (HumaLOG) 10 Unit(s) SubCutaneous every 6 hours  methylPREDNISolone sodium succinate Injectable 60 milliGRAM(s) IV Push two times a day  midazolam Infusion 0.05 mG/kG/Hr IV Continuous <Continuous>  propofol Infusion 5 MICROgram(s)/kG/Min IV Continuous <Continuous>  sodium chloride 0.45%. 1000 milliLiter(s) IV Continuous <Continuous>  vancomycin  IVPB 750 milliGRAM(s) IV Intermittent every 24 hours    PRN MEDICATIONS  acetaminophen   Tablet .. 650 milliGRAM(s) Oral every 6 hours PRN  dextrose 40% Gel 15 Gram(s) Oral once PRN  glucagon  Injectable 1 milliGRAM(s) IntraMuscular once PRN    VITALS:   T(F): 96.3  HR: 80  BP: 108/66  RR: 20  SpO2: 100%    LABS:                        13.3   11.72 )-----------( 260      ( 05 Apr 2020 13:08 )             38.7     04-05    143  |  110  |  88<HH>  ----------------------------<  212<H>  5.6<H>   |  19  |  3.1<H>    Ca    8.7      05 Apr 2020 13:08    TPro  6.4  /  Alb  2.5<L>  /  TBili  0.3  /  DBili  x   /  AST  54<H>  /  ALT  49<H>  /  AlkPhos  71  04-05    PT/INR - ( 05 Apr 2020 13:08 )   PT: 12.90 sec;   INR: 1.12 ratio         PTT - ( 05 Apr 2020 13:08 )  PTT:25.6 sec    ABG - ( 06 Apr 2020 07:48 )  pH, Arterial: 7.32  pH, Blood: x     /  pCO2: 39    /  pO2: 234   / HCO3: 20    / Base Excess: -5.7  /  SaO2: 99          RADIOLOGY:  < from: Xray Chest 1 View- PORTABLE-Routine (04.05.20 @ 06:28) >  Bilateral airspace opacities, unchanged.  Stable support lines and tubes      Patino Catheter:   Indwelling Urethral Catheter:     Connect To:  Straight Drainage/Gravity    Indication:  Urine Output Monitoring in Critically Ill (04-02-20 @ 20:42) (not performed) [active]    I&O's Detail  05 Apr 2020 07:01  -  06 Apr 2020 07:00  --------------------------------------------------------  IN:    Osmolite: 240 mL    sodium chloride 0.9%: 450 mL  Total IN: 690 mL    OUT:    Indwelling Catheter - Urethral: 2000 mL  Total OUT: 2000 mL    Total NET: -1310 mL SUBJECTIVE:    Patient is a 59y old Male who presents with a chief complaint of Transferred from [facility] where he presented with Cough and Shortness of breath (05 Apr 2020 15:03)    Currently admitted to medicine with the primary diagnosis of    Today is hospital day 6d. intubated, sedated   INTERVAL EVENTS: afebrile, attempted to extubate 4/5 to CPAP, failed     PAST MEDICAL & SURGICAL HISTORY  No significant past surgical history      ALLERGIES:  No Known Allergies    MEDICATIONS:  STANDING MEDICATIONS  anakinra Injectable 100 milliGRAM(s) SubCutaneous every 6 hours  cefTRIAXone   IVPB 2000 milliGRAM(s) IV Intermittent every 24 hours  chlorhexidine 0.12% Liquid 15 milliLiter(s) Oral Mucosa every 12 hours  chlorhexidine 4% Liquid 1 Application(s) Topical <User Schedule>  dextrose 5%. 1000 milliLiter(s) IV Continuous <Continuous>  dextrose 50% Injectable 12.5 Gram(s) IV Push once  dextrose 50% Injectable 25 Gram(s) IV Push once  dextrose 50% Injectable 25 Gram(s) IV Push once  heparin  Injectable 5000 Unit(s) SubCutaneous every 8 hours  influenza   Vaccine 0.5 milliLiter(s) IntraMuscular once  insulin glargine Injectable (LANTUS) 30 Unit(s) SubCutaneous at bedtime  insulin lispro (HumaLOG) corrective regimen sliding scale   SubCutaneous three times a day before meals  insulin lispro Injectable (HumaLOG) 10 Unit(s) SubCutaneous every 6 hours  methylPREDNISolone sodium succinate Injectable 60 milliGRAM(s) IV Push two times a day  midazolam Infusion 0.05 mG/kG/Hr IV Continuous <Continuous>  propofol Infusion 5 MICROgram(s)/kG/Min IV Continuous <Continuous>  sodium chloride 0.45%. 1000 milliLiter(s) IV Continuous <Continuous>  vancomycin  IVPB 750 milliGRAM(s) IV Intermittent every 24 hours    PRN MEDICATIONS  acetaminophen   Tablet .. 650 milliGRAM(s) Oral every 6 hours PRN  dextrose 40% Gel 15 Gram(s) Oral once PRN  glucagon  Injectable 1 milliGRAM(s) IntraMuscular once PRN    VITALS:   T(F): 96.3  HR: 80  BP: 108/66  RR: 20  SpO2: 100%    LABS:                        13.3   11.72 )-----------( 260      ( 05 Apr 2020 13:08 )             38.7     04-05    143  |  110  |  88<HH>  ----------------------------<  212<H>  5.6<H>   |  19  |  3.1<H>    Ca    8.7      05 Apr 2020 13:08    TPro  6.4  /  Alb  2.5<L>  /  TBili  0.3  /  DBili  x   /  AST  54<H>  /  ALT  49<H>  /  AlkPhos  71  04-05    PT/INR - ( 05 Apr 2020 13:08 )   PT: 12.90 sec;   INR: 1.12 ratio         PTT - ( 05 Apr 2020 13:08 )  PTT:25.6 sec    ABG - ( 06 Apr 2020 07:48 )  pH, Arterial: 7.32  pH, Blood: x     /  pCO2: 39    /  pO2: 234   / HCO3: 20    / Base Excess: -5.7  /  SaO2: 99          RADIOLOGY:  < from: Xray Chest 1 View- PORTABLE-Routine (04.05.20 @ 06:28) >  Bilateral airspace opacities, unchanged.  Stable support lines and tubes    Patino Catheter:   Indwelling Urethral Catheter:     Connect To:  Straight Drainage/Gravity    Indication:  Urine Output Monitoring in Critically Ill (04-02-20 @ 20:42) (not performed) [active]    I&O's Detail  05 Apr 2020 07:01  -  06 Apr 2020 07:00  --------------------------------------------------------  IN:    Osmolite: 240 mL    sodium chloride 0.9%: 450 mL  Total IN: 690 mL    OUT:    Indwelling Catheter - Urethral: 2000 mL  Total OUT: 2000 mL    Total NET: -1310 mL

## 2020-04-06 NOTE — PROGRESS NOTE ADULT - ASSESSMENT
59y old Male who presents with a chief complaint of cough and shortness of breath (03 Apr 2020 10:51). Currently admitted to medicine with the primary diagnosis of  pneumonia due to covid 19.    #COVID 19 with Hypoxemia, CXR with b/l opacities, cytokine response leading to resp failure and MV, likely with bacterial pna co-infection   -INTUBATED sedated, not on pressors  -< from: Xray Chest 1 View- PORTABLE-Routine (04.06.20 @ 06:04) Stable bilateral opacities (right greater than left). Support devices as described above.  -s/p azithromycin and hydroxychloroquine, continue with ceftriaxone 2g q24, vanc 1g, decreased to 750 q24 today due to TAMIKA, f/u 1600 random vanc level   -PEEP 10, RR 20, 70% FiO2, decrease to 40%  -procal elevated, suggestive of a bacterial coinfection, on ceftriaxone and vanc 750 q24, vanc trough before new 4th dose   -C-Reactive Protein, Serum (04.04.20 @ 07:43)S nadeem: 35.09  -Procalcitonin, Serum (04.04.20 @ 07:43) Procalcitonin, Serum: 4.01  -BCx NG  -f/u nasal mrsa, urine strep and legionella PENDING collection   -c/w methylpred 60 IV BID, anakinra 100 q6 for 3 days     #Transaminitis  -down trending, f/u 1600 CMP    #TAMIKA, stable   -good UOP, Total NET: -1310 mL  -switch NS to 1/2 NS 75cc/hour    #hyperkalemia  -f/u 1600 CMP    #hyperglycemia, no known hx of DM  -high TGsl, low LDL  -Hba1c 6.2  -30/10/10/10     CODE: FULL  DIET: NPO WITH TUBE FEED  DVT PROPHYLAXIS: heparin   FAMILY UPDATED by communication team 59y old Male who presents with a chief complaint of cough and shortness of breath (03 Apr 2020 10:51). Currently admitted to medicine with the primary diagnosis of  pneumonia due to covid 19.    #COVID 19 with Hypoxemia, CXR with b/l opacities, cytokine response leading to resp failure and MV, likely with bacterial pna co-infection   -INTUBATED sedated, not on pressors, attempted to extubate 4/5 to CPAP, failed   -< from: Xray Chest 1 View- PORTABLE-Routine (04.06.20 @ 06:04) Stable bilateral opacities (right greater than left). Support devices as described above.  -s/p azithromycin and hydroxychloroquine, continue with ceftriaxone 2g q24, vanc 1g, decreased to 750 q24 today due to TAMIKA, f/u 1600 random vanc level   -PEEP 10, RR 20, 70% FiO2,  lower FIO2 to35, lower IPAP to 20, Epap 10, pulling good TV, ABG in 2 hrs if remain stable will do weaning trial  (repeat ABG ~ 2pm)  -procal elevated, suggestive of a bacterial coinfection, on ceftriaxone and vanc 750 q24, vanc trough before new 4th dose   -C-Reactive Protein, Serum (04.04.20 @ 07:43)S nadeem: 35.09  -Procalcitonin, Serum (04.04.20 @ 07:43) Procalcitonin, Serum: 4.01  -BCx NG  -f/u nasal mrsa, urine strep and legionella PENDING collection   -c/w methylpred 60 IV BID, anakinra 100 q6 for 3 days     #Transaminitis  -down trending, f/u AM CMP (lab mistake, resulting ~12pm)     #TAMIKA, stable   -good UOP, Total NET: -1310 mL  -switch NS to 1/2 NS 75cc/hour    #hyperkalemia  -f/u AM CMP (lab made a mistake w tubes, labs pending, will result ~ noon)     #hyperglycemia, no known hx of DM  -high TGsl, low LDL  -Hba1c 6.2  -30/10/10/10     CODE: FULL  DIET: NPO WITH TUBE FEED  DVT PROPHYLAXIS: heparin   FAMILY UPDATED by communication team 59y old Male who presents with a chief complaint of cough and shortness of breath (03 Apr 2020 10:51). Currently admitted to medicine with the primary diagnosis of  pneumonia due to covid 19.    #COVID 19 with Hypoxemia, CXR with b/l opacities, cytokine response leading to resp failure and MV, likely with bacterial pna co-infection   -INTUBATED sedated, not on pressors, attempted to extubate 4/5 to CPAP, failed   -< from: Xray Chest 1 View- PORTABLE-Routine (04.06.20 @ 06:04) Stable bilateral opacities (right greater than left). Support devices as described above.  -s/p azithromycin and hydroxychloroquine, continue with ceftriaxone 2g q24, vanc 1g, decreased to 750 q24 today due to TAMIKA, f/u 1600 random vanc level   -PEEP 10, RR 20, 70% FiO2,  lower FIO2 to35, lower IPAP to 20, Epap 8, pulling good TV (315), ABG in 2 hrs if remain stable will do weaning trial  (repeat ABG ~ 2pm)  -procal elevated, suggestive of a bacterial coinfection, on ceftriaxone and vanc 750 q24, vanc trough before new 4th dose   -C-Reactive Protein, Serum (04.04.20 @ 07:43)S nadeem: 35.09  -Procalcitonin, Serum (04.04.20 @ 07:43) Procalcitonin, Serum: 4.01  -BCx NG  -f/u nasal mrsa, urine strep and legionella PENDING collection   -c/w methylpred 60 IV BID, anakinra 100 q6 for 3 days     #Transaminitis  -down trending, f/u AM CMP (lab mistake, resulting ~12pm)     #hyperkalemia  -x1 kayexelate now, BMP 8pm    #TAMIKA, worsening   -good UOP, Total NET: -1310 mL  -switch NS to 1/2 NS 75cc/hour    #hyperglycemia, no known hx of DM  -high TGsl, low LDL  -Hba1c 6.2  -30/10/10/10     CODE: FULL  DIET: NPO WITH TUBE FEED  DVT PROPHYLAXIS: heparin   FAMILY UPDATED by communication team

## 2020-04-07 NOTE — PROGRESS NOTE ADULT - SUBJECTIVE AND OBJECTIVE BOX
Patient is a 59y old  Male who presents with a chief complaint of Arf (06 Apr 2020 11:33)      Over Night Events:  Patient seen and examined still vented off pressor   failed weaning trial has secretion     ROS:  See HPI    PHYSICAL EXAM    ICU Vital Signs Last 24 Hrs  T(C): 35.7 (07 Apr 2020 08:17), Max: 36.7 (07 Apr 2020 04:00)  T(F): 96.3 (07 Apr 2020 08:17), Max: 98 (07 Apr 2020 04:00)  HR: 73 (07 Apr 2020 08:17) (54 - 104)  BP: 147/83 (07 Apr 2020 08:17) (116/69 - 147/83)  BP(mean): --  ABP: --  ABP(mean): --  RR: 20 (07 Apr 2020 08:17) (20 - 20)  SpO2: 100% (07 Apr 2020 08:17) (95% - 100%)      General:  on sedation   HEENT:    et tube             Lymph Nodes: NO cervical LN   Lungs: Bilateral BS  Cardiovascular: Regular   Abdomen: Soft, Positive BS  Extremities: No clubbing   Skin: warm   Neurological: no focal deficit    Musculoskeletal: move all ext     I&O's Detail    06 Apr 2020 07:01  -  07 Apr 2020 07:00  --------------------------------------------------------  IN:    Osmolite: 240 mL  Total IN: 240 mL    OUT:    Indwelling Catheter - Urethral: 1000 mL    Voided: 1400 mL  Total OUT: 2400 mL    Total NET: -2160 mL          LABS:                          10.6   5.49  )-----------( 266      ( 07 Apr 2020 06:14 )             31.8         07 Apr 2020 06:14    151    |  122    |  95     ----------------------------<  133    5.1     |  17     |  2.7      Ca    7.1        07 Apr 2020 06:14  Mg     2.8       07 Apr 2020 06:14    TPro  4.9    /  Alb  2.0    /  TBili  <0.2   /  DBili  x      /  AST  42     /  ALT  73     /  AlkPhos  52     07 Apr 2020 06:14  Amylase x     lipase x                                                 PT/INR - ( 05 Apr 2020 13:08 )   PT: 12.90 sec;   INR: 1.12 ratio         PTT - ( 05 Apr 2020 13:08 )  PTT:25.6 sec                                                                                                                                                Mode: AC/ CMV (Assist Control/ Continuous Mandatory Ventilation)  RR (machine): 20  FiO2: 40  20/10                                      ABG - ( 07 Apr 2020 07:56 )  pH, Arterial: 7.37  pH, Blood: x     /  pCO2: 37    /  pO2: 111   / HCO3: 20    / Base Excess: -4.9  /  SaO2: 98                  MEDICATIONS  (STANDING):  cefTRIAXone   IVPB 2000 milliGRAM(s) IV Intermittent every 24 hours  chlorhexidine 0.12% Liquid 15 milliLiter(s) Oral Mucosa every 12 hours  chlorhexidine 4% Liquid 1 Application(s) Topical <User Schedule>  dexMEDEtomidine Infusion 0.2 MICROgram(s)/kG/Hr (3.77 mL/Hr) IV Continuous <Continuous>  dextrose 5%. 1000 milliLiter(s) (60 mL/Hr) IV Continuous <Continuous>  dextrose 5%. 1000 milliLiter(s) (50 mL/Hr) IV Continuous <Continuous>  dextrose 50% Injectable 12.5 Gram(s) IV Push once  dextrose 50% Injectable 25 Gram(s) IV Push once  dextrose 50% Injectable 25 Gram(s) IV Push once  dextrose 50% Injectable 50 milliLiter(s) IV Push once  heparin  Injectable 5000 Unit(s) SubCutaneous every 8 hours  influenza   Vaccine 0.5 milliLiter(s) IntraMuscular once  insulin glargine Injectable (LANTUS) 30 Unit(s) SubCutaneous at bedtime  insulin lispro (HumaLOG) corrective regimen sliding scale   SubCutaneous three times a day before meals  insulin lispro Injectable (HumaLOG) 10 Unit(s) SubCutaneous every 6 hours  insulin regular  human recombinant. 10 Unit(s) IV Push once  methylPREDNISolone sodium succinate Injectable 60 milliGRAM(s) IV Push two times a day  midazolam Infusion 0.05 mG/kG/Hr (3.77 mL/Hr) IV Continuous <Continuous>  propofol Infusion 5 MICROgram(s)/kG/Min (2.26 mL/Hr) IV Continuous <Continuous>  vancomycin  IVPB 750 milliGRAM(s) IV Intermittent every 24 hours    MEDICATIONS  (PRN):  acetaminophen   Tablet .. 650 milliGRAM(s) Oral every 6 hours PRN Temp greater or equal to 38C (100.4F)  dextrose 40% Gel 15 Gram(s) Oral once PRN Blood Glucose LESS THAN 70 milliGRAM(s)/deciliter  glucagon  Injectable 1 milliGRAM(s) IntraMuscular once PRN Glucose LESS THAN 70 milligrams/deciliter          Xrays:  TLC:  OG:  ET tube:                                                                                    b/l oapcity    ECHO:  [facility] ICU:

## 2020-04-07 NOTE — CHART NOTE - NSCHARTNOTEFT_GEN_A_CORE
Registered Dietitian Follow-Up     Patient Profile Reviewed                           Yes [x]   No []     Nutrition History Previously Obtained        Yes []  No [x]  intubated     Pertinent Subjective Information: intubated/sedated, TF at goal     Pertinent Medical Interventions:  attempted SBT yesterday. COVID 19 with Hypoxemia, CXR with b/l opacities, cytokine response leading to resp failure and MV. hyperkalemia, improving. hypernatremia. TAMIKA, improving. hyperglycemia.      Diet order: Osmolite 1.0 @ 120ml q 6hr (509 kcal 21 g protein)     Anthropometrics:  - Ht. 162.6 cm   - Wt. 75.4 kg (3/31  - %wt change  - BMI 28.5   - IBW     Pertinent Lab Data: (4/7) H/h 10.6 /31.8  POCT glucose 170  Na 151 K 5.1  Cl 122 BUN 95  Cr 2.7  Mg 2.8      Pertinent Meds: heparin, lantus.humalog, precedex, versed.      Physical Findings:  - Appearance: intubated   - GI function: ? LBM  - Tubes: OGT  - Oral/Mouth cavity: NPO  - Skin: intact     Nutrition Requirements  Weight Used: 75.4 kg, needs cont from last RD note      CALORIES: ~1405 kcals/day ([id number])  PROTEIN: 90 - 113 gms/day (1.2 - 1.5 gm/kg CBW - will aim for lower end given TAMIKA)  FLUID: per LIP     Nutrient Intake: 509 kcal 21 g protein        [] Previous Nutrition Diagnosis: Inadequate protein energy intake.             [x] Ongoing          [] Resolved    [] No active nutrition diagnosis identified at this time    Nutrition Intervention  EN      Goal/Expected Outcome: Pt to meet % of needs in 3 days      Indicator/Monitoring: RD to monitor energy intake, diet order, body comp, NFPF, renal/glucose/lytes profile     Recommendation: Change feeds to Osmolite 1.5 @ 240ml q8hr + no carb prosource TF q6hr (this provides 1225 kcals, 89 gms protein, 547 ml free water, 1271 mg K+) Flushes per LIP

## 2020-04-07 NOTE — CHART NOTE - NSCHARTNOTEFT_GEN_A_CORE
Communications team:    Spoke with wife. Updated her on patient's status, answered questions, addressed concerns.

## 2020-04-07 NOTE — PROGRESS NOTE ADULT - ASSESSMENT
59M from home, lives with family, with no pertinent PMHx or PSHx presents to the ED with complaints of one week of a cough and shortness of breath.    IMPRESSION;   COVID19 with resolving septic shock, off  pressors, mechanical ventilation with ARDS with FiO2 60%, secondary to Cytokine Release Syndrome  -end organ damage with renal failure and mild transaminitis  -CXR with b/l opacities  -inflammatory markers significantly elevated  -elevated Ddimer and Ferritin are poor prognostic indicators and differentiate survivors from non survivors  procal of 1.5 suggestive of a bacterial coinfection  BCx NG    RECOMMENDATIONS;  -Rocephin 2 gm iv q24h   s/p PLAQUENIL  Consider Lovenox .  -indication: ddimer>1000.   -exclusion: active bleeding, H/o HIT, recent hemorrhagic stroke   -prognosis is very poor

## 2020-04-07 NOTE — PROGRESS NOTE ADULT - ASSESSMENT
59y old Male who presents with a chief complaint of cough and shortness of breath (03 Apr 2020 10:51). Currently admitted to medicine with the primary diagnosis of  pneumonia due to covid 19.    #COVID 19 with Hypoxemia, CXR with b/l opacities, cytokine response leading to resp failure and MV, likely with bacterial pna co-infection   -INTUBATED sedated, not on pressors, attempted to extubate 4/5 to CPAP, failed   -< from: Xray Chest 1 View- PORTABLE-Routine (04.06.20 @ 06:04) Stable bilateral opacities (right greater than left). Support devices as described above.  -s/p azithromycin and hydroxychloroquine, continue with ceftriaxone 2g q24, vanc 1g, decreased to 750 q24 today due to TAMIKA, f/u 1600 random vanc level   -PEEP 10, RR 20, 70% FiO2,  lower FIO2 to35, lower IPAP to 20, Epap 8, pulling good TV (315), ABG in 2 hrs if remain stable will do weaning trial  (repeat ABG ~ 2pm)  -procal elevated, suggestive of a bacterial coinfection, on ceftriaxone and vanc 750 q24, vanc trough before new 4th dose   -C-Reactive Protein, Serum (04.04.20 @ 07:43)S nadeem: 35.09  -Procalcitonin, Serum (04.04.20 @ 07:43) Procalcitonin, Serum: 4.01  -BCx NG  -f/u nasal mrsa, urine strep and legionella PENDING collection   -c/w methylpred 60 IV BID, anakinra 100 q6 for 3 days     #Transaminitis  -down trending, f/u AM CMP (lab mistake, resulting ~12pm)     #hyperkalemia, improving     #TAMIKA, improving   -good UOP, Total NET: -1310 mL  -switch D5     #hyperglycemia, no known hx of DM  -high TGsl, low LDL  -Hba1c 6.2  -30/10/10/10     CODE: FULL  DIET: NPO WITH TUBE FEED  DVT PROPHYLAXIS: heparin   FAMILY UPDATED by communication team 59y old Male who presents with a chief complaint of cough and shortness of breath (03 Apr 2020 10:51). Currently admitted to medicine with the primary diagnosis of  pneumonia due to covid 19.    #COVID 19 with Hypoxemia, CXR with b/l opacities, cytokine response leading to resp failure and MV, likely with bacterial pna co-infection   -INTUBATED sedated, not on pressors, attempted to extubate 4/5 to CPAP, failed   -< from: Xray Chest 1 View- PORTABLE-Routine (04.06.20 @ 06:04) Stable bilateral opacities (right greater than left). Support devices as described above.  -s/p azithromycin and hydroxychloroquine, continue with ceftriaxone 2g q24, vanc 1g, decreased to 750 q24 today due to TAMIKA, 1st dose 4/7   -fiO2 40%, 25/10 TV 400s, SAT today, can add precedex, hold propofol and versed   -procal elevated, suggestive of a bacterial coinfection, on ceftriaxone and vanc 750 q24, vanc trough before new 4th dose (AM of 4/10)   -C-Reactive Protein, Serum (04.04.20 @ 07:43)S nadeem: 35.09  -Procalcitonin, Serum (04.04.20 @ 07:43) Procalcitonin, Serum: 4.01  -BCx NG  -f/u nasal mrsa, urine strep and legionella PENDING collection   -c/w methylpred 60 IV BID, anakinra 100 q6 for 3 days     #Transaminitis    #hyperkalemia, improving     #hypernatremia  D5W @100cc/hr     #TAMIKA, improving   -good UOP, Total NET: -1310 mL  -switch D5W     #hyperglycemia, no known hx of DM  -high TGsl, low LDL  -Hba1c 6.2  -30/10/10/10     CODE: FULL  DIET: NPO WITH TUBE FEED  DVT PROPHYLAXIS: heparin   FAMILY UPDATED by communication team  Dispo: SAT 59y old Male who presents with a chief complaint of cough and shortness of breath (03 Apr 2020 10:51). Currently admitted to medicine with the primary diagnosis of  pneumonia due to covid 19.    #COVID 19 with Hypoxemia, CXR with b/l opacities, cytokine response leading to resp failure and MV, likely with bacterial pna co-infection   -INTUBATED sedated, not on pressors, attempted to extubate 4/5 to CPAP, failed   -< from: Xray Chest 1 View- PORTABLE-Routine (04.06.20 @ 06:04) Stable bilateral opacities (right greater than left). Support devices as described above.  -s/p azithromycin and hydroxychloroquine, continue with ceftriaxone 2g q24, d/c vanc, send MRSA nares, bronchial culture    -fiO2 40%, 25/10 TV 400s, SAT today, can add precedex, hold propofol and versed   -procal elevated, suggestive of a bacterial coinfection, on ceftriaxone   -C-Reactive Protein, Serum (04.04.20 @ 07:43)S nadeem: 35.09  -Procalcitonin, Serum (04.04.20 @ 07:43) Procalcitonin, Serum: 4.01  -BCx NG  -f/u nasal mrsa, urine strep and legionella PENDING collection   -c/w methylpred 60 IV BID, anakinra 100 q6 for 3 days     #Transaminitis  -trend CMP    #hyperkalemia, improving     #hypernatremia  D5W @100cc/hr     #TAMIKA, improving   -good UOP, Total NET: -1310 mL  -switch to D5W     #hyperglycemia, no known hx of DM  -high TGsl, low LDL  -Hba1c 6.2  -30/10/10/10     CODE: FULL  DIET: NPO WITH TUBE FEED  DVT PROPHYLAXIS: heparin   FAMILY UPDATED by communication team  Dispo: SAT/SBT 59y old Male who presents with a chief complaint of cough and shortness of breath (03 Apr 2020 10:51). Currently admitted to medicine with the primary diagnosis of  pneumonia due to covid 19.    #COVID 19 with Hypoxemia, CXR with b/l opacities, cytokine response leading to resp failure and MV, likely with bacterial pna co-infection   -INTUBATED sedated, not on pressors, attempted to extubate 4/5 to CPAP, failed   -< from: Xray Chest 1 View- PORTABLE-Routine (04.06.20 @ 06:04) Stable bilateral opacities (right greater than left). Support devices as described above.  -s/p azithromycin and hydroxychloroquine, continue with ceftriaxone 2g q24, d/c vanc, send MRSA nares, bronchial culture    -fiO2 40%, 25/10 TV 400s, SAT today, can add precedex, hold propofol and versed   -procal elevated, suggestive of a bacterial coinfection, on ceftriaxone   -C-Reactive Protein, Serum (04.04.20 @ 07:43)S nadeem: 35.09  -Procalcitonin, Serum (04.04.20 @ 07:43) Procalcitonin, Serum: 4.01  -BCx NG  -f/u nasal mrsa, urine strep and legionella PENDING collection   -c/w methylpred 60 IV BID  -s/p anakinra 100 q6 for 3 days, azithro, and plaquenil     #Transaminitis  -trend CMP    #hyperkalemia, improving     #hypernatremia  D5W @100cc/hr     #TAMIKA, improving   -good UOP, Total NET: -1310 mL  -switch to D5W     #hyperglycemia, no known hx of DM  -high TGsl, low LDL  -Hba1c 6.2  -30/10/10/10     CODE: FULL  DIET: NPO WITH TUBE FEED  DVT PROPHYLAXIS: heparin   FAMILY UPDATED by communication team  Dispo: SAT/SBT

## 2020-04-07 NOTE — PROGRESS NOTE ADULT - SUBJECTIVE AND OBJECTIVE BOX
AMAYA ORTEGA  59y, Male    All available historical data reviewed    OVERNIGHT EVENTS:  fio2 60%  no pressors    ROS:  unable to obtain history secondary to patient's mental status and/or sedation     VITALS:  T(F): 96.3, Max: 98 (04-07-20 @ 04:00)  HR: 73  BP: 147/83  RR: 20Vital Signs Last 24 Hrs  T(C): 35.7 (07 Apr 2020 08:17), Max: 36.7 (07 Apr 2020 04:00)  T(F): 96.3 (07 Apr 2020 08:17), Max: 98 (07 Apr 2020 04:00)  HR: 73 (07 Apr 2020 08:17) (54 - 104)  BP: 147/83 (07 Apr 2020 08:17) (108/66 - 147/83)  BP(mean): --  RR: 20 (07 Apr 2020 08:17) (20 - 20)  SpO2: 100% (07 Apr 2020 08:17) (95% - 100%)    TESTS & MEASUREMENTS:                        10.6   5.49  )-----------( 266      ( 07 Apr 2020 06:14 )             31.8     04-07    151<H>  |  122<H>  |  95<HH>  ----------------------------<  133<H>  5.1<H>   |  17  |  2.7<H>    Ca    7.1<L>      07 Apr 2020 06:14  Mg     2.8     04-07    TPro  4.9<L>  /  Alb  2.0<L>  /  TBili  <0.2  /  DBili  x   /  AST  42<H>  /  ALT  73<H>  /  AlkPhos  52  04-07    LIVER FUNCTIONS - ( 07 Apr 2020 06:14 )  Alb: 2.0 g/dL / Pro: 4.9 g/dL / ALK PHOS: 52 U/L / ALT: 73 U/L / AST: 42 U/L / GGT: x             Culture - Blood (collected 03-31-20 @ 13:59)  Source: .Blood  Final Report (04-04-20 @ 14:00):    No Growth Final    Culture - Blood (collected 03-31-20 @ 13:59)  Source: .Blood  Final Report (04-04-20 @ 14:00):    No Growth Final            RADIOLOGY & ADDITIONAL TESTS:  Personal review of radiological diagnostics performed  Echo and EKG results noted when applicable.     MEDICATIONS:  acetaminophen   Tablet .. 650 milliGRAM(s) Oral every 6 hours PRN  cefTRIAXone   IVPB 2000 milliGRAM(s) IV Intermittent every 24 hours  chlorhexidine 0.12% Liquid 15 milliLiter(s) Oral Mucosa every 12 hours  chlorhexidine 4% Liquid 1 Application(s) Topical <User Schedule>  dexMEDEtomidine Infusion 0.2 MICROgram(s)/kG/Hr IV Continuous <Continuous>  dextrose 40% Gel 15 Gram(s) Oral once PRN  dextrose 5%. 1000 milliLiter(s) IV Continuous <Continuous>  dextrose 5%. 1000 milliLiter(s) IV Continuous <Continuous>  dextrose 50% Injectable 12.5 Gram(s) IV Push once  dextrose 50% Injectable 25 Gram(s) IV Push once  dextrose 50% Injectable 25 Gram(s) IV Push once  dextrose 50% Injectable 50 milliLiter(s) IV Push once  glucagon  Injectable 1 milliGRAM(s) IntraMuscular once PRN  heparin  Injectable 5000 Unit(s) SubCutaneous every 8 hours  influenza   Vaccine 0.5 milliLiter(s) IntraMuscular once  insulin glargine Injectable (LANTUS) 30 Unit(s) SubCutaneous at bedtime  insulin lispro (HumaLOG) corrective regimen sliding scale   SubCutaneous three times a day before meals  insulin lispro Injectable (HumaLOG) 10 Unit(s) SubCutaneous every 6 hours  insulin regular  human recombinant. 10 Unit(s) IV Push once  methylPREDNISolone sodium succinate Injectable 60 milliGRAM(s) IV Push two times a day  midazolam Infusion 0.05 mG/kG/Hr IV Continuous <Continuous>  propofol Infusion 5 MICROgram(s)/kG/Min IV Continuous <Continuous>  vancomycin  IVPB 750 milliGRAM(s) IV Intermittent every 24 hours      ANTIBIOTICS:  cefTRIAXone   IVPB 2000 milliGRAM(s) IV Intermittent every 24 hours  vancomycin  IVPB 750 milliGRAM(s) IV Intermittent every 24 hours

## 2020-04-07 NOTE — PROGRESS NOTE ADULT - SUBJECTIVE AND OBJECTIVE BOX
SUBJECTIVE:    Patient is a 59y old Male who presents with a chief complaint of Arf (06 Apr 2020 11:33)    Currently admitted to medicine with the primary diagnosis of    Today is hospital day 7d. intubated, sedated     INTERVAL EVENTS: attempted SBT yesterday, was tachycardic and sedation not held     PAST MEDICAL & SURGICAL HISTORY  No significant past surgical history      ALLERGIES:  No Known Allergies    MEDICATIONS:  STANDING MEDICATIONS  cefTRIAXone   IVPB 2000 milliGRAM(s) IV Intermittent every 24 hours  chlorhexidine 0.12% Liquid 15 milliLiter(s) Oral Mucosa every 12 hours  chlorhexidine 4% Liquid 1 Application(s) Topical <User Schedule>  dextrose 5%. 1000 milliLiter(s) IV Continuous <Continuous>  dextrose 50% Injectable 12.5 Gram(s) IV Push once  dextrose 50% Injectable 25 Gram(s) IV Push once  dextrose 50% Injectable 25 Gram(s) IV Push once  dextrose 50% Injectable 50 milliLiter(s) IV Push once  heparin  Injectable 5000 Unit(s) SubCutaneous every 8 hours  influenza   Vaccine 0.5 milliLiter(s) IntraMuscular once  insulin glargine Injectable (LANTUS) 30 Unit(s) SubCutaneous at bedtime  insulin lispro (HumaLOG) corrective regimen sliding scale   SubCutaneous three times a day before meals  insulin lispro Injectable (HumaLOG) 10 Unit(s) SubCutaneous every 6 hours  insulin regular  human recombinant. 10 Unit(s) IV Push once  methylPREDNISolone sodium succinate Injectable 60 milliGRAM(s) IV Push two times a day  midazolam Infusion 0.05 mG/kG/Hr IV Continuous <Continuous>  propofol Infusion 5 MICROgram(s)/kG/Min IV Continuous <Continuous>  sodium chloride 0.45%. 1000 milliLiter(s) IV Continuous <Continuous>  vancomycin  IVPB 750 milliGRAM(s) IV Intermittent every 24 hours    PRN MEDICATIONS  acetaminophen   Tablet .. 650 milliGRAM(s) Oral every 6 hours PRN  dextrose 40% Gel 15 Gram(s) Oral once PRN  glucagon  Injectable 1 milliGRAM(s) IntraMuscular once PRN    VITALS:   T(F): 96.3  HR: 73  BP: 147/83  RR: 20  SpO2: 100%    LABS:                        10.6   5.49  )-----------( 266      ( 07 Apr 2020 06:14 )             31.8     04-07    151<H>  |  122<H>  |  95<HH>  ----------------------------<  133<H>  5.1<H>   |  17  |  2.7<H>    Ca    7.1<L>      07 Apr 2020 06:14  Mg     2.8     04-07    TPro  4.9<L>  /  Alb  2.0<L>  /  TBili  <0.2  /  DBili  x   /  AST  42<H>  /  ALT  73<H>  /  AlkPhos  52  04-07    PT/INR - ( 05 Apr 2020 13:08 )   PT: 12.90 sec;   INR: 1.12 ratio    PTT - ( 05 Apr 2020 13:08 )  PTT:25.6 sec    ABG - ( 06 Apr 2020 15:04 )  pH, Arterial: 7.21  pH, Blood: x     /  pCO2: 52    /  pO2: 86    / HCO3: 21    / Base Excess: -7.3  /  SaO2: 94          RADIOLOGY:  f/u official read     PHYSICAL EXAM:  GEN: intubated, sedated   Patino Catheter:   Indwelling Urethral Catheter:     Connect To:  Straight Drainage/Gravity    Indication:  Urine Output Monitoring in Critically Ill (04-02-20 @ 20:42) (not performed) [active]    I&O's Detail    06 Apr 2020 07:01  -  07 Apr 2020 07:00  --------------------------------------------------------  IN:    Osmolite: 240 mL  Total IN: 240 mL    OUT:    Indwelling Catheter - Urethral: 1000 mL    Voided: 1400 mL  Total OUT: 2400 mL    Total NET: -2160 mL

## 2020-04-08 NOTE — PROGRESS NOTE ADULT - ASSESSMENT
Assessment:    Acute hypoxemic respiratory failure secondary to SARS-COV-2  SARS-COV-2 PNA worsening status  Betsey improving     PLAN:    CNS: hold propofol only      HEENT:  Oral care    PULMONARY:  HOB @ 45 degrees, trend procalcitonin, LDH/CK/ferritin/CRP   again weaning trail on precedex        CARDIOVASCULAR:     GI: GI prophylaxis Protonix                                         Feeding: NG feed     RENAL:  F/u  lytes.  Correct as needed. accurate I/O  IV fluid   INFECTIOUS DISEASE s/p HCQ      bld cx      on rocephine     HEMATOLOGICAL:  DVT prophylaxis.    ENDOCRINE:  Follow up FS.  Insulin protocol if needed.    MUSCULOSKELETAL: Bed rest for now    CODE STATUS: FULL CODE    DISPOSITION: Patient require continued monitoring in MICU.  poor prognosis

## 2020-04-08 NOTE — PROGRESS NOTE ADULT - SUBJECTIVE AND OBJECTIVE BOX
SHANNON, AMAYA  59y, Male    All available historical data reviewed    OVERNIGHT EVENTS:  fio2 40%    ROS:  General: Denies rigors, nightsweats  HEENT: Denies headache, rhinorrhea, sore throat, eye pain  CV: Denies CP, palpitations  PULM: Denies wheezing, hemoptysis  GI: Denies hematemesis, hematochezia, melena  : Denies discharge, hematuria  MSK: Denies arthralgias, myalgias  SKIN: Denies rash, lesions  NEURO: Denies paresthesias, weakness  PSYCH: Denies depression, anxiety    VITALS:  T(F): 95.4, Max: 98.5 (04-08-20 @ 00:00)  HR: 87  BP: 169/90  RR: 22Vital Signs Last 24 Hrs  T(C): 35.2 (08 Apr 2020 09:02), Max: 36.9 (08 Apr 2020 00:00)  T(F): 95.4 (08 Apr 2020 09:02), Max: 98.5 (08 Apr 2020 00:00)  HR: 87 (08 Apr 2020 12:06) (54 - 87)  BP: 169/90 (08 Apr 2020 12:06) (109/62 - 169/90)  BP(mean): --  RR: 22 (08 Apr 2020 12:06) (20 - 23)  SpO2: 97% (08 Apr 2020 12:06) (97% - 100%)    TESTS & MEASUREMENTS:                        11.0   4.89  )-----------( 284      ( 08 Apr 2020 04:30 )             33.7     04-08    149<H>  |  117<H>  |  97<HH>  ----------------------------<  217<H>  5.6<H>   |  20  |  2.5<H>    Ca    7.5<L>      08 Apr 2020 04:30  Mg     2.9     04-08    TPro  5.4<L>  /  Alb  2.0<L>  /  TBili  <0.2  /  DBili  x   /  AST  23  /  ALT  69<H>  /  AlkPhos  52  04-08    LIVER FUNCTIONS - ( 08 Apr 2020 04:30 )  Alb: 2.0 g/dL / Pro: 5.4 g/dL / ALK PHOS: 52 U/L / ALT: 69 U/L / AST: 23 U/L / GGT: x                   RADIOLOGY & ADDITIONAL TESTS:  Personal review of radiological diagnostics performed  Echo and EKG results noted when applicable.     MEDICATIONS:  acetaminophen   Tablet .. 650 milliGRAM(s) Oral every 6 hours PRN  chlorhexidine 0.12% Liquid 15 milliLiter(s) Oral Mucosa every 12 hours  chlorhexidine 4% Liquid 1 Application(s) Topical <User Schedule>  dexMEDEtomidine Infusion 0.2 MICROgram(s)/kG/Hr IV Continuous <Continuous>  dextrose 40% Gel 15 Gram(s) Oral once PRN  dextrose 5%. 1000 milliLiter(s) IV Continuous <Continuous>  dextrose 5%. 1000 milliLiter(s) IV Continuous <Continuous>  dextrose 50% Injectable 12.5 Gram(s) IV Push once  dextrose 50% Injectable 25 Gram(s) IV Push once  dextrose 50% Injectable 25 Gram(s) IV Push once  dextrose 50% Injectable 50 milliLiter(s) IV Push once  glucagon  Injectable 1 milliGRAM(s) IntraMuscular once PRN  heparin  Injectable 5000 Unit(s) SubCutaneous every 8 hours  influenza   Vaccine 0.5 milliLiter(s) IntraMuscular once  insulin glargine Injectable (LANTUS) 30 Unit(s) SubCutaneous at bedtime  insulin lispro (HumaLOG) corrective regimen sliding scale   SubCutaneous three times a day before meals  insulin lispro Injectable (HumaLOG) 10 Unit(s) SubCutaneous every 6 hours  insulin regular  human recombinant. 10 Unit(s) IV Push once  methylPREDNISolone sodium succinate Injectable 60 milliGRAM(s) IV Push two times a day  midazolam Infusion 0.05 mG/kG/Hr IV Continuous <Continuous>  propofol Infusion 5 MICROgram(s)/kG/Min IV Continuous <Continuous>      ANTIBIOTICS:

## 2020-04-08 NOTE — PROGRESS NOTE ADULT - SUBJECTIVE AND OBJECTIVE BOX
SUBJECTIVE:    Patient is a 59y old Male who presents with a chief complaint of ARf (07 Apr 2020 11:00)    Currently admitted to medicine with the primary diagnosis of    Today is hospital day 8d. intubated, sedated  INTERVAL EVENTS: NAEON     PAST MEDICAL & SURGICAL HISTORY  No significant past surgical history    ALLERGIES:  No Known Allergies    MEDICATIONS:  STANDING MEDICATIONS  cefTRIAXone   IVPB 2000 milliGRAM(s) IV Intermittent every 24 hours  chlorhexidine 0.12% Liquid 15 milliLiter(s) Oral Mucosa every 12 hours  chlorhexidine 4% Liquid 1 Application(s) Topical <User Schedule>  dexMEDEtomidine Infusion 0.2 MICROgram(s)/kG/Hr IV Continuous <Continuous>  dextrose 5%. 1000 milliLiter(s) IV Continuous <Continuous>  dextrose 5%. 1000 milliLiter(s) IV Continuous <Continuous>  dextrose 50% Injectable 12.5 Gram(s) IV Push once  dextrose 50% Injectable 25 Gram(s) IV Push once  dextrose 50% Injectable 25 Gram(s) IV Push once  dextrose 50% Injectable 50 milliLiter(s) IV Push once  heparin  Injectable 5000 Unit(s) SubCutaneous every 8 hours  influenza   Vaccine 0.5 milliLiter(s) IntraMuscular once  insulin glargine Injectable (LANTUS) 30 Unit(s) SubCutaneous at bedtime  insulin lispro (HumaLOG) corrective regimen sliding scale   SubCutaneous three times a day before meals  insulin lispro Injectable (HumaLOG) 10 Unit(s) SubCutaneous every 6 hours  insulin regular  human recombinant. 10 Unit(s) IV Push once  methylPREDNISolone sodium succinate Injectable 60 milliGRAM(s) IV Push two times a day  midazolam Infusion 0.05 mG/kG/Hr IV Continuous <Continuous>  propofol Infusion 5 MICROgram(s)/kG/Min IV Continuous <Continuous>    PRN MEDICATIONS  acetaminophen   Tablet .. 650 milliGRAM(s) Oral every 6 hours PRN  dextrose 40% Gel 15 Gram(s) Oral once PRN  glucagon  Injectable 1 milliGRAM(s) IntraMuscular once PRN    VITALS:   T(F): 95.4  HR: 77  BP: 166/99  RR: 23  SpO2: 100%    LABS:                        11.0   4.89  )-----------( 284      ( 08 Apr 2020 04:30 )             33.7     04-08    149<H>  |  117<H>  |  97<HH>  ----------------------------<  217<H>  5.6<H>   |  20  |  2.5<H>    Ca    7.5<L>      08 Apr 2020 04:30  Mg     2.9     04-08    TPro  5.4<L>  /  Alb  2.0<L>  /  TBili  <0.2  /  DBili  x   /  AST  23  /  ALT  69<H>  /  AlkPhos  52  04-08    ABG - ( 08 Apr 2020 07:49 )  pH, Arterial: 7.31  pH, Blood: x     /  pCO2: 41    /  pO2: 138   / HCO3: 21    / Base Excess: -5.3  /  SaO2: 98        RADIOLOGY:  < from: Xray Chest 1 View- PORTABLE-Routine (04.08.20 @ 05:34) >  Unchanged patchy bilateral opacities.    < end of copied text >    PHYSICAL EXAM:  GEN: intubated, sedated    Patino Catheter:   Indwelling Urethral Catheter:     Connect To:  Straight Drainage/Gravity    Indication:  Urine Output Monitoring in Critically Ill (04-02-20 @ 20:42) (not performed) [active]  I&O's Detail    07 Apr 2020 07:01  -  08 Apr 2020 07:00  --------------------------------------------------------  IN:    Osmolite: 240 mL  Total IN: 240 mL    OUT:    Indwelling Catheter - Urethral: 2100 mL  Total OUT: 2100 mL    Total NET: -1860 mL      08 Apr 2020 07:01  -  08 Apr 2020 09:46  --------------------------------------------------------  IN:  Total IN: 0 mL    OUT:    Indwelling Catheter - Urethral: 300 mL  Total OUT: 300 mL    Total NET: -300 mL

## 2020-04-08 NOTE — PROGRESS NOTE ADULT - SUBJECTIVE AND OBJECTIVE BOX
Patient is a 59y old  Male who presents with a chief complaint of ARf (07 Apr 2020 11:00)      Over Night Events:  Patient seen and examined.   on propofol and precedex     ROS:  See HPI    PHYSICAL EXAM    ICU Vital Signs Last 24 Hrs  T(C): 35.2 (08 Apr 2020 09:02), Max: 36.9 (08 Apr 2020 00:00)  T(F): 95.4 (08 Apr 2020 09:02), Max: 98.5 (08 Apr 2020 00:00)  HR: 77 (08 Apr 2020 09:10) (54 - 82)  BP: 166/99 (08 Apr 2020 09:01) (102/56 - 166/99)  BP(mean): --  ABP: --  ABP(mean): --  RR: 23 (08 Apr 2020 09:01) (20 - 23)  SpO2: 100% (08 Apr 2020 09:10) (98% - 100%)      General: on sedation   HEENT: et tube                Lymph Nodes: NO cervical LN   Lungs: Bilateral BS  Cardiovascular: Regular   Abdomen: Soft, Positive BS  Extremities: No clubbing   Skin: warm   Neurological: no focal deficit   Musculoskeletal: move all ext     I&O's Detail    07 Apr 2020 07:01  -  08 Apr 2020 07:00  --------------------------------------------------------  IN:    Osmolite: 240 mL  Total IN: 240 mL    OUT:    Indwelling Catheter - Urethral: 2100 mL  Total OUT: 2100 mL    Total NET: -1860 mL      08 Apr 2020 07:01  -  08 Apr 2020 11:06  --------------------------------------------------------  IN:  Total IN: 0 mL    OUT:    Indwelling Catheter - Urethral: 300 mL  Total OUT: 300 mL    Total NET: -300 mL          LABS:                          11.0   4.89  )-----------( 284      ( 08 Apr 2020 04:30 )             33.7         08 Apr 2020 04:30    149    |  117    |  97     ----------------------------<  217    5.6     |  20     |  2.5      Ca    7.5        08 Apr 2020 04:30  Mg     2.9       08 Apr 2020 04:30    TPro  5.4    /  Alb  2.0    /  TBili  <0.2   /  DBili  x      /  AST  23     /  ALT  69     /  AlkPhos  52     08 Apr 2020 04:30  Amylase x     lipase x                                                                                                                                                                                                 Mode: AC/ CMV (Assist Control/ Continuous Mandatory Ventilation)  RR (machine): 20  FiO2: 40  PEEP: 10  ITime: 1  PC: 20  PIP: 20                                      ABG - ( 08 Apr 2020 07:49 )  pH, Arterial: 7.31  pH, Blood: x     /  pCO2: 41    /  pO2: 138   / HCO3: 21    / Base Excess: -5.3  /  SaO2: 98                  MEDICATIONS  (STANDING):  cefTRIAXone   IVPB 2000 milliGRAM(s) IV Intermittent every 24 hours  chlorhexidine 0.12% Liquid 15 milliLiter(s) Oral Mucosa every 12 hours  chlorhexidine 4% Liquid 1 Application(s) Topical <User Schedule>  dexMEDEtomidine Infusion 0.2 MICROgram(s)/kG/Hr (3.77 mL/Hr) IV Continuous <Continuous>  dextrose 5%. 1000 milliLiter(s) (60 mL/Hr) IV Continuous <Continuous>  dextrose 5%. 1000 milliLiter(s) (50 mL/Hr) IV Continuous <Continuous>  dextrose 50% Injectable 12.5 Gram(s) IV Push once  dextrose 50% Injectable 25 Gram(s) IV Push once  dextrose 50% Injectable 25 Gram(s) IV Push once  dextrose 50% Injectable 50 milliLiter(s) IV Push once  heparin  Injectable 5000 Unit(s) SubCutaneous every 8 hours  influenza   Vaccine 0.5 milliLiter(s) IntraMuscular once  insulin glargine Injectable (LANTUS) 30 Unit(s) SubCutaneous at bedtime  insulin lispro (HumaLOG) corrective regimen sliding scale   SubCutaneous three times a day before meals  insulin lispro Injectable (HumaLOG) 10 Unit(s) SubCutaneous every 6 hours  insulin regular  human recombinant. 10 Unit(s) IV Push once  methylPREDNISolone sodium succinate Injectable 60 milliGRAM(s) IV Push two times a day  midazolam Infusion 0.05 mG/kG/Hr (3.77 mL/Hr) IV Continuous <Continuous>  propofol Infusion 5 MICROgram(s)/kG/Min (2.26 mL/Hr) IV Continuous <Continuous>    MEDICATIONS  (PRN):  acetaminophen   Tablet .. 650 milliGRAM(s) Oral every 6 hours PRN Temp greater or equal to 38C (100.4F)  dextrose 40% Gel 15 Gram(s) Oral once PRN Blood Glucose LESS THAN 70 milliGRAM(s)/deciliter  glucagon  Injectable 1 milliGRAM(s) IntraMuscular once PRN Glucose LESS THAN 70 milligrams/deciliter          Xrays:  TLC:  OG:  ET tube:                                                                                    stable b/l opacity    ECHO:  [facility] ICU:

## 2020-04-08 NOTE — PROGRESS NOTE ADULT - ASSESSMENT
· Assessment		  59M from home, lives with family, with no pertinent PMHx or PSHx presents to the ED with complaints of one week of a cough and shortness of breath.    IMPRESSION;   COVID19 with resolving septic shock, off  pressors, mechanical ventilation with ARDS with FiO2 40%, secondary to Cytokine Release Syndrome  -end organ damage with renal failure and mild transaminitis  -CXR with b/l opacities  -inflammatory markers significantly elevated. d Dimer [id number]  -elevated Ddimer and Ferritin are poor prognostic indicators and differentiate survivors from non survivors  procal of 1.5 suggestive of a bacterial coinfection  BCx NG    RECOMMENDATIONS;  -Rocephin 2 gm iv q24h   s/p PLAQUENIL

## 2020-04-08 NOTE — PROGRESS NOTE ADULT - ASSESSMENT
59y old Male who presents with a chief complaint of cough and shortness of breath (03 Apr 2020 10:51). Currently admitted to medicine with the primary diagnosis of  pneumonia due to covid 19.    #COVID 19 with Hypoxemia, CXR with b/l opacities, cytokine response leading to resp failure and MV, likely with bacterial pna co-infection   -INTUBATED sedated, not on pressors, attempted to extubate 4/5 to CPAP, failed   -< from: Xray Chest 1 View- PORTABLE-Routine (04.06.20 @ 06:04) Stable bilateral opacities (right greater than left). Support devices as described above.  -s/p azithromycin and hydroxychloroquine, continue with ceftriaxone 2g q24, d/c vanc, send MRSA nares, bronchial culture    -fiO2 40%, 25/10 TV 400s, SAT today, can inc precedex, held propofol   -procal elevated, suggestive of a bacterial coinfection, on ceftriaxone   -BCx NG  -f/u nasal mrsa, urine strep and legionella PENDING collection   -c/w methylpred 60 IV BID  -s/p anakinra 100 q6 for 3 days, azithro, and plaquenil     #Transaminitis  -trend CMP    #hyperkalemia  -trend     #hypernatremia  D5W @100cc/hr   -c/w 300cc QID     #TAMIKA, improving   -switch to D5W     #hyperglycemia, no known hx of DM  -high TGsl, low LDL  -Hba1c 6.2  -30/10/10/10     CODE: FULL  DIET: NPO WITH TUBE FEED  DVT PROPHYLAXIS: heparin   FAMILY UPDATED by communication team  Dispo: extubate

## 2020-04-09 NOTE — PROGRESS NOTE ADULT - SUBJECTIVE AND OBJECTIVE BOX
SUBJECTIVE:    Patient is a 59y old Male who presents with a chief complaint of ARF (08 Apr 2020 11:06)    Currently admitted to medicine with the primary diagnosis of    Today is hospital day 9d.   INTERVAL EVENTS: failed SBT again 4/8, hypertensive and hypoxia     PAST MEDICAL & SURGICAL HISTORY  No significant past surgical history    ALLERGIES:  No Known Allergies    MEDICATIONS:  STANDING MEDICATIONS  chlorhexidine 0.12% Liquid 15 milliLiter(s) Oral Mucosa every 12 hours  chlorhexidine 4% Liquid 1 Application(s) Topical <User Schedule>  dexMEDEtomidine Infusion 0.2 MICROgram(s)/kG/Hr IV Continuous <Continuous>  dextrose 5%. 1000 milliLiter(s) IV Continuous <Continuous>  dextrose 5%. 1000 milliLiter(s) IV Continuous <Continuous>  dextrose 50% Injectable 12.5 Gram(s) IV Push once  dextrose 50% Injectable 25 Gram(s) IV Push once  dextrose 50% Injectable 25 Gram(s) IV Push once  dextrose 50% Injectable 50 milliLiter(s) IV Push once  heparin  Injectable 5000 Unit(s) SubCutaneous every 8 hours  influenza   Vaccine 0.5 milliLiter(s) IntraMuscular once  insulin glargine Injectable (LANTUS) 30 Unit(s) SubCutaneous at bedtime  insulin lispro (HumaLOG) corrective regimen sliding scale   SubCutaneous three times a day before meals  insulin lispro Injectable (HumaLOG) 10 Unit(s) SubCutaneous every 6 hours  insulin regular  human recombinant. 10 Unit(s) IV Push once  labetalol Injectable 10 milliGRAM(s) IV Push once  methylPREDNISolone sodium succinate Injectable 60 milliGRAM(s) IV Push two times a day  midazolam Infusion 0.05 mG/kG/Hr IV Continuous <Continuous>  propofol Infusion 5 MICROgram(s)/kG/Min IV Continuous <Continuous>  QUEtiapine 50 milliGRAM(s) Oral daily    PRN MEDICATIONS  acetaminophen   Tablet .. 650 milliGRAM(s) Oral every 6 hours PRN  dextrose 40% Gel 15 Gram(s) Oral once PRN  glucagon  Injectable 1 milliGRAM(s) IntraMuscular once PRN    VITALS:   T(F): 96.4  HR: 103  BP: 193/103  RR: 20  SpO2: 97%    LABS:                        11.4   5.78  )-----------( 292      ( 09 Apr 2020 05:48 )             34.1     04-09    142  |  114<H>  |  92<HH>  ----------------------------<  239<H>  6.0<HH>   |  19  |  2.3<H>    Ca    8.5      09 Apr 2020 05:48  Mg     2.9     04-09    TPro  6.1  /  Alb  2.7<L>  /  TBili  0.2  /  DBili  x   /  AST  36  /  ALT  96<H>  /  AlkPhos  64  04-09  ABG - ( 09 Apr 2020 00:25 )  pH, Arterial: 7.28  pH, Blood: x     /  pCO2: 41    /  pO2: 123   / HCO3: 19    / Base Excess: -6.9  /  SaO2: 98        Creatine Kinase, Serum: 60 U/L (04-09-20 @ 05:48)  CARDIAC MARKERS ( 09 Apr 2020 05:48 )  x     / x     / 60 U/L / x     / x        RADIOLOGY:  < from: Xray Chest 1 View- PORTABLE-Routine (04.09.20 @ 06:26) >  IMPRESSION:   Worsening bilateral opacities.    PHYSICAL EXAM:  GEN: intubated, sedated      I&O's Detail    08 Apr 2020 07:01  -  09 Apr 2020 07:00  --------------------------------------------------------  IN:    Free Water: 300 mL    Osmolite: 120 mL  Total IN: 420 mL    OUT:    Indwelling Catheter - Urethral: 2945 mL  Total OUT: 2945 mL    Total NET: -2525 mL

## 2020-04-09 NOTE — PROGRESS NOTE ADULT - SUBJECTIVE AND OBJECTIVE BOX
Patient is a 59y old  Male who presents with a chief complaint of ARF (08 Apr 2020 11:06)      Over Night Events:  Patient seen and examined still vented failed weaning trial agitation increase BP when off PROPOFOL       ROS:  See HPI    PHYSICAL EXAM    ICU Vital Signs Last 24 Hrs  T(C): 35.4 (09 Apr 2020 09:29), Max: 35.8 (09 Apr 2020 05:00)  T(F): 95.8 (09 Apr 2020 09:29), Max: 96.4 (09 Apr 2020 05:00)  HR: 55 (09 Apr 2020 10:44) (55 - 103)  BP: 159/95 (09 Apr 2020 10:13) (135/86 - 193/103)  BP(mean): 115 (09 Apr 2020 00:00) (115 - 117)  ABP: --  ABP(mean): --  RR: 20 (09 Apr 2020 09:29) (20 - 24)  SpO2: 100% (09 Apr 2020 10:44) (97% - 100%)      General: ON SEDATION OPEN EYES  HEENT:      ET TUBE           Lymph Nodes: NO cervical LN   Lungs: Bilateral BS  Cardiovascular: Regular   Abdomen: Soft, Positive BS  Extremities: No clubbing   Skin: warm   Neurological: NO FOCAL DEFICIT   Musculoskeletal: move all ext     I&O's Detail    08 Apr 2020 07:01  -  09 Apr 2020 07:00  --------------------------------------------------------  IN:    Free Water: 300 mL    Osmolite: 120 mL  Total IN: 420 mL    OUT:    Indwelling Catheter - Urethral: 2945 mL  Total OUT: 2945 mL    Total NET: -2525 mL          LABS:                          11.4   5.78  )-----------( 292      ( 09 Apr 2020 05:48 )             34.1         09 Apr 2020 05:48    142    |  114    |  92     ----------------------------<  239    6.0     |  19     |  2.3      Ca    8.5        09 Apr 2020 05:48  Mg     2.9       09 Apr 2020 05:48    TPro  6.1    /  Alb  2.7    /  TBili  0.2    /  DBili  x      /  AST  36     /  ALT  96     /  AlkPhos  64     09 Apr 2020 05:48  Amylase x     lipase x                                                                                              CARDIAC MARKERS ( 09 Apr 2020 05:48 )  x     / x     / 60 U/L / x     / x                                                                                                         Mode: AC/ CMV (Assist Control/ Continuous Mandatory Ventilation)  RR (machine): 20  FiO2: 40  PEEP: 8  ITime: 1  PC: 20  PIP: 20                                      ABG - ( 09 Apr 2020 00:25 )  pH, Arterial: 7.28  pH, Blood: x     /  pCO2: 41    /  pO2: 123   / HCO3: 19    / Base Excess: -6.9  /  SaO2: 98                  MEDICATIONS  (STANDING):  chlorhexidine 0.12% Liquid 15 milliLiter(s) Oral Mucosa every 12 hours  chlorhexidine 4% Liquid 1 Application(s) Topical <User Schedule>  dexMEDEtomidine Infusion 0.2 MICROgram(s)/kG/Hr (3.77 mL/Hr) IV Continuous <Continuous>  dextrose 5%. 1000 milliLiter(s) (60 mL/Hr) IV Continuous <Continuous>  dextrose 5%. 1000 milliLiter(s) (50 mL/Hr) IV Continuous <Continuous>  dextrose 50% Injectable 12.5 Gram(s) IV Push once  dextrose 50% Injectable 25 Gram(s) IV Push once  dextrose 50% Injectable 25 Gram(s) IV Push once  dextrose 50% Injectable 50 milliLiter(s) IV Push once  heparin  Injectable 5000 Unit(s) SubCutaneous every 8 hours  influenza   Vaccine 0.5 milliLiter(s) IntraMuscular once  insulin glargine Injectable (LANTUS) 30 Unit(s) SubCutaneous at bedtime  insulin lispro (HumaLOG) corrective regimen sliding scale   SubCutaneous three times a day before meals  insulin lispro Injectable (HumaLOG) 10 Unit(s) SubCutaneous every 6 hours  insulin regular  human recombinant. 10 Unit(s) IV Push once  methylPREDNISolone sodium succinate Injectable 60 milliGRAM(s) IV Push two times a day  midazolam Infusion 0.05 mG/kG/Hr (3.77 mL/Hr) IV Continuous <Continuous>  propofol Infusion 5 MICROgram(s)/kG/Min (2.26 mL/Hr) IV Continuous <Continuous>  QUEtiapine 50 milliGRAM(s) Oral daily    MEDICATIONS  (PRN):  acetaminophen   Tablet .. 650 milliGRAM(s) Oral every 6 hours PRN Temp greater or equal to 38C (100.4F)  dextrose 40% Gel 15 Gram(s) Oral once PRN Blood Glucose LESS THAN 70 milliGRAM(s)/deciliter  glucagon  Injectable 1 milliGRAM(s) IntraMuscular once PRN Glucose LESS THAN 70 milligrams/deciliter          Xrays:  TLC:  OG:  ET tube:                                                                                    STABLE B/L OPACITY    ECHO:  CAM ICU:

## 2020-04-09 NOTE — CHART NOTE - NSCHARTNOTEFT_GEN_A_CORE
Was contacted by nurse at approximately 3:45 regarding pt's bradycardia. RN states they moved the patient to his side to change him, when they noticed heavy secretions coming out of his mouth. Nurse started suctioning the pt and noticed HR began to drop precipitously. Pt's HR was ~ 45, SPB in 70s, rapid response was called, pt's HR was then in the 20s. Monitor showed PEA, compressions were then started. ROSC was achieved after 4 rounds of CPR and 3 epinephrines. ET tube was full of thick secretions, he was extubated, bagged and anesthesia stat was called.   Pt became jose, was given atropine x2, levophed and dobutamine. Pt went into V fib and coded again, amio was given, ROSC achieved after 2 rounds of compressions and 2 epis. Femoral line placed.     Plan:  - C/w Levophed, dobutamine drip  - C/w bicarb drip @ 125 cc/hr   - F/u confirmatory cxr  - F/u stat labs   - F/u stat abg Was contacted by nurse at approximately 3:45 regarding pt's bradycardia. RN states they moved the patient to his side to change him, when they noticed heavy secretions coming out of his mouth. Nurse started suctioning the pt and noticed HR began to drop precipitously. Pt's HR was ~ 45, SPB in 70s, rapid response was called, pt's HR was then in the 20s. Monitor showed PEA, compressions were then started. ROSC was achieved after 4 rounds of CPR and 3 epinephrines. ET tube was full of thick secretions, he was extubated, bagged and anesthesia stat was called.   Pt became jose, was given atropine x2, levophed and dobutamine. Pt went into V fib and coded again, amio was given, ROSC achieved after 2 rounds of compressions and 2 epis. Femoral line placed. Family was contacted and updated on current status and prognosis.     Plan:  - C/w Levophed, dobutamine drip  - C/w bicarb drip @ 125 cc/hr   - F/u confirmatory cxr  - F/u stat labs   - F/u stat abg

## 2020-04-09 NOTE — PROGRESS NOTE ADULT - ASSESSMENT
Assessment:    Acute hypoxemic respiratory failure secondary to SARS-COV-2  SARS-COV-2 PNA worsening status  Betsey improving     PLAN:    CNS: START SERAQUIL q 12 HRS PRECEDEX   FOR AGITATION      HEENT:  Oral care    PULMONARY:  HOB @ 45 degrees, trend procalcitonin, LDH/CK/ferritin/CRP  FAILED WEANING TRAIL AGAIN TODAY INCREASE bp , AGITATION        CARDIOVASCULAR:     GI: GI prophylaxis Protonix                                         Feeding: NG feed   FREE WATER   RENAL:  F/u  lytes.  Correct as needed. accurate I/O  IV fluid d5 W  INFECTIOUS DISEASE s/p HCQ      bld cx      on rocephine     HEMATOLOGICAL:  DVT prophylaxis.    ENDOCRINE:  Follow up FS.  Insulin protocol if needed.    MUSCULOSKELETAL: Bed rest for now    CODE STATUS: FULL CODE    DISPOSITION: Patient require continued monitoring in MICU.  poor prognosis

## 2020-04-09 NOTE — CHART NOTE - NSCHARTNOTEFT_GEN_A_CORE
Spoke with spouse, Brigette.   Updated her on patient's status.   Answered questions, addressed concerns.    Discussed family's concerns with IM Resident/team. Spoke with spouse, Brigette.   Updated her on patient's status.   Answered questions, addressed concerns.    Spouse work request letter confirming pt hospitalization and care.  Please fax to 2 places of employment:  #1: [phone] and #2: [phone]  Discussed family's concerns with IM Resident/team

## 2020-04-09 NOTE — PROGRESS NOTE ADULT - ASSESSMENT
59y old Male who presents with a chief complaint of cough and shortness of breath (03 Apr 2020 10:51). Currently admitted to medicine with the primary diagnosis of  pneumonia due to covid 19.    #COVID 19 with Hypoxemia, CXR with b/l opacities, cytokine response leading to resp failure and MV, likely with bacterial pna co-infection   -INTUBATED sedated, not on pressors, failed extubation 4/8, failed SBT this morning, became agitated and hypertensive again, given 1x labetalol 10 and start on seroquel 50QD (EKG tmrw am), will re-attempt tmrw. 40%FiO2, 340 TV, 20/10, RR20   -s/p azithromycin and hydroxychloroquine, ceftriaxone 2g q24 x7 days  -procal elevated, suggestive of a bacterial coinfection, s/p 7 d ceftriaxone   -BCx NG  -f/u nasal mrsa, urine strep and legionella PENDING collection   -c/w methylpred 60 IV BID  -s/p anakinra 100 q6 for 3 days, azithro, and plaquenil     #Transaminitis  -trend CMP    #hyperkalemia  -trend -f/u 11am BMP     #hypernatremia  D5W @100cc/hr   -c/w 300cc QID     #TAMIKA, improving   -switch to D5W     #hyperglycemia, no known hx of DM  -high TGsl, low LDL  -Hba1c 6.2  -30/10/10/10     CODE: FULL  DIET: NPO WITH TUBE FEED  DVT PROPHYLAXIS: heparin   FAMILY UPDATED by communication team  Dispo: extubate

## 2020-04-09 NOTE — PROCEDURE NOTE - NSPOSTCAREGUIDE_GEN_A_CORE
Care for catheter as per unit/ICU protocols
Keep the cast/splint/dressing clean and dry/Care for catheter as per unit/ICU protocols/Instructed patient/caregiver regarding signs and symptoms of infection/Instructed patient/caregiver to follow-up with primary care physician/Verbal/written post procedure instructions were given to patient/caregiver

## 2020-04-10 NOTE — PROGRESS NOTE ADULT - ASSESSMENT
Assessment:    Acute hypoxemic respiratory failure secondary to SARS-COV-2  SARS-COV-2 PNA worsening status  Betsey improving   hyperkalemia   s/p code Blue ??  PLAN:    CNS: on sedation do sedation vacation to assess mental status   HEENT:  Oral care    PULMONARY:  HOB @ 45 degrees, trend procalcitonin,        CARDIOVASCULAR:     GI: GI prophylaxis Protonix                                         Feeding: NG feed   FREE WATER     RENAL:  F/u  lytes.on inuslin   start [facility]   renal consult for hyperkalmia and CKD   check CK leve  l   INFECTIOUS DISEASE s/p HCQ    bld cx    if spike temp start merop   send DTA   bld cx     HEMATOLOGICAL:  DVT prophylaxis.    ENDOCRINE:  Follow up FS.  Insulin protocol if needed.    MUSCULOSKELETAL: Bed rest for now    CODE STATUS: FULL CODE    DISPOSITION: Patient require continued monitoring in MICU.  poor prognosis

## 2020-04-10 NOTE — CHART NOTE - NSCHARTNOTEFT_GEN_A_CORE
Communication Care Team- Attending    Spoke with wife, Brigette ([phone]).   Updated her on patient's status.   Answered questions, addressed concerns.    Confirmed work fax numbers #1: [phone] and #2: [phone] for hospitalization confirmation for work leave of absence  Discussed family's concerns with IM Resident/team.

## 2020-04-10 NOTE — CHART NOTE - NSCHARTNOTEFT_GEN_A_CORE
Registered Dietitian Follow-Up     Patient Profile Reviewed                           Yes [x]   No []     Nutrition History Previously Obtained        Yes []  No [x]  intubated     Pertinent Subjective Information: intubated/sedated, TF at goal     Pertinent Medical Interventions: RR on 4/9 - reintubated. levophed and dobutamine drips. Weaning off propofol. hyperkalemia - started on insulin drip. hypernatremia- water flushes. hyperglycemia. COVID 19 with Hypoxemia.      Diet order: Osmolite 1.0 @ 120ml q 6hr (509 kcal 21 g protein)     Anthropometrics:  - Ht. 162.6 cm   - Wt. 75.4 kg (3/31) no new weights   - %wt change  - BMI 28.5   - IBW     Pertinent Lab Data: (4/10) H/H 11.7/35.4  POCT glucose 218  Na 148  K 7.0  Cl 111    Cr 2.9  GFR 22  Mg 3.3      Pertinent Meds: heparin, insulin, D10 = 85 kcals, lokelma, insulin, dobutamine, levophed,  precedex,      Physical Findings:  - Appearance: intubated   - GI function: LBM 4/5  - Tubes: OGT  - Oral/Mouth cavity: NPO  - Skin: intact      Nutrition Requirements  Weight Used: 75.4 kg, slightly adjusted Ve: 16.1  Tmax: 37.8       CALORIES: ~3992-0282 kcals/day   ~2050 kcals/day ([id number]) vs. 8909-7030 kcals (20-25 kcal/kg CBW)  PROTEIN: 90 - 113 gms/day (1.2 - 1.5 gm/kg CBW)  FLUID: per LIP     Nutrient Intake: 509 kcal 21 g protein        [] Previous Nutrition Diagnosis: Inadequate protein energy intake.             [x] Ongoing          [] Resolved    [] No active nutrition diagnosis identified at this time    Nutrition Intervention  EN      Goal/Expected Outcome: Pt to meet % of needs in 4 days      Indicator/Monitoring: RD to monitor energy intake, diet order, body comp, NFPF, renal/glucose/lytes profile     Recommendation: Change feeds to Osmolite 1.5 @ 240ml q6hr + no carb prosource TF q8hr (this provides 1540 kcals, 93 gms protein, 730 ml free water, 1696 mg K+) Flushes per LIP. Correct lytes.

## 2020-04-10 NOTE — PROGRESS NOTE ADULT - ASSESSMENT
59y old Male who presents with a chief complaint of cough and shortness of breath (03 Apr 2020 10:51). Currently admitted to medicine with the primary diagnosis of  pneumonia due to covid 19.    #COVID 19 with Hypoxemia, CXR with b/l opacities, cytokine response leading to resp failure and MV, likely with bacterial pna co-infection   -INTUBATED sedated, failed multiple SATs this week, coded x2 4/9, pt was tuning pt to give Kayexelate for hyperK+, pt's HR ryanne to 20s,lots of secretions noted, PEA, ROSC was achieved after 4 rounds of CPR and 3 epinephrines. ET tube was full of thick secretions, extubated, bagged and re-intubated, s/p atropine x2, levophed and dobutamine. Pt went into V fib and coded again, amio was given, ROSC achieved after 2 rounds of compressions and 2 epis. L Femoral line placed  -on propofol and precedex, wean off propofol and inc precedex, if agitated off propofol, can start versed   - 100%FiO2, 440 TV, 35/14, RR30 -> changed to FiO2 50%, 30/11   -s/p azithromycin,  anakinra 100 q6 for 3 days, hydroxychloroquine, ceftriaxone 2g q24 x7 days  -procal elevated, suggestive of a bacterial coinfection, s/p 7 d ceftriaxone   -BCx NG, MRSA nares neg   -urine strep and legionella PENDING collection   -c/w methylpred 60 IV BID  - from: Xray Chest 1 View- PORTABLE-Routine (04.10.20 @ 05:53) > Bilateral opacities, slightly improved on the left    #Transaminitis  -trend CMP    #hyperkalemia  -most recent K+ Potassium, Serum: 7.1 (930AM), 1 more round of D50 and insulin push  -repeat CMP at ~12pm   -start on insulin gtt     #hypernatremia  -D5 w 150 bicarbonate  -c/w 300cc QID     #TAMIKA, improving   -D5 w 150 bicarbonate    #hyperglycemia, no known hx of DM  -high TGsl, low LDL  -Hba1c 6.2  -insulin gtt   -f/u repeat TG and CK   -nephro c/s placed, senior spoke to nephro fellow     CODE: FULL  DIET: NPO WITH TUBE FEED  DVT PROPHYLAXIS: heparin   Dispo: acute 59y old Male who presents with a chief complaint of cough and shortness of breath (03 Apr 2020 10:51). Currently admitted to medicine with the primary diagnosis of  pneumonia due to covid 19.    #COVID 19 with Hypoxemia, CXR with b/l opacities, cytokine response leading to resp failure and MV, likely with bacterial pna co-infection   -INTUBATED sedated, failed multiple SATs this week, coded x2 4/9, pt was tuning pt to give Kayexelate for hyperK+, pt's HR ryanne to 20s,lots of secretions noted, PEA, ROSC was achieved after 4 rounds of CPR and 3 epinephrines. ET tube was full of thick secretions, extubated, bagged and re-intubated, s/p atropine x2, levophed and dobutamine. Pt went into V fib and coded again, amio was given, ROSC achieved after 2 rounds of compressions and 2 epis. L Femoral line placed  -on propofol and precedex, wean off propofol and inc precedex, if agitated off propofol, can start versed   -100%FiO2, 440 TV, 35/14, RR30 -> changed to FiO2 50%, 30/11   -s/p azithromycin,  anakinra 100 q6 for 3 days, hydroxychloroquine, ceftriaxone 2g q24 x7 days  -procal elevated, suggestive of a bacterial coinfection, s/p 7 d ceftriaxone   -BCx NG, MRSA nares neg   -urine strep and legionella PENDING collection   -c/w methylpred 60 IV BID  - from: Xray Chest 1 View- PORTABLE-Routine (04.10.20 @ 05:53) > Bilateral opacities, slightly improved on the left  -Triglycerides, Serum (04.10.20 @ 09:30) 375 mg/dL  -Creatine Kinase, Serum (04.10.20 @ 09:30): 117 U/L    #Transaminitis  -trend CMP    #hyperkalemia  -most recent K+ Potassium, Serum: 7.1 (930AM), 1 more round of D50 and insulin push  -repeat CMP at ~12pm   -start on insulin gtt     #hypernatremia  -D5 w 150 bicarbonate  -c/w 300cc QID     #TAMIKA, improving   -D5 w 150 bicarbonate    #hyperglycemia, no known hx of DM  -high TGsl, low LDL  -Hba1c 6.2  -insulin gtt   -f/u repeat TG and CK   -nephro c/s placed, senior spoke to nephro fellow     CODE: FULL  DIET: NPO WITH TUBE FEED  DVT PROPHYLAXIS: heparin   Dispo: acute 59y old Male who presents with a chief complaint of cough and shortness of breath (03 Apr 2020 10:51). Currently admitted to medicine with the primary diagnosis of  pneumonia due to covid 19.    #COVID 19 with Hypoxemia, CXR with b/l opacities, cytokine response leading to resp failure and MV, likely with bacterial pna co-infection   -INTUBATED sedated, failed multiple SATs this week, coded x2 4/9, pt was tuning pt to give Kayexelate for hyperK+, pt's HR ryanne to 20s,lots of secretions noted, PEA, ROSC was achieved after 4 rounds of CPR and 3 epinephrines. ET tube was full of thick secretions, ? mucus plug, extubated, bagged and re-intubated, s/p atropine x2, levophed and dobutamine. Pt went into V fib and coded again, amio was given, ROSC achieved after 2 rounds of compressions and 2 epis. L Femoral line placed  -on propofol and precedex, wean off propofol and inc precedex, if agitated off propofol, can start versed   -100%FiO2, 440 TV, 35/14, RR30 -> changed to FiO2 50%, 30/11   -s/p azithromycin,  anakinra 100 q6 for 3 days, hydroxychloroquine, ceftriaxone 2g q24 x7 days  -procal elevated, suggestive of a bacterial coinfection, s/p 7 d ceftriaxone   -BCx NG, MRSA nares neg   -urine strep and legionella PENDING collection   -c/w methylpred 60 IV BID  - from: Xray Chest 1 View- PORTABLE-Routine (04.10.20 @ 05:53) > Bilateral opacities, slightly improved on the left  -Triglycerides, Serum (04.10.20 @ 09:30) 375 mg/dL  -Creatine Kinase, Serum (04.10.20 @ 09:30): 117 U/L  -if spike fever, start meropenem, send deep tracheal asp (provider to RN, made aware)    #Transaminitis  -trend CMP    #hyperkalemia  -most recent K+ Potassium, Serum: 7.1 (930AM), s/p lactulose, D50 and insulin push x2, give 1x zirconium   -repeat CMP at ~12pm   -start on insulin gtt     #hypernatremia  -D5 w 150 bicarbonate  -c/w 300cc QID     #TAMIKA, improving   -D5 w 150 bicarbonate    #hyperglycemia, no known hx of DM  -high TGsl, low LDL  -Hba1c 6.2  -insulin gtt   -nephro c/s placed, senior spoke to nephro fellow     CODE: FULL  DIET: NPO WITH TUBE FEED  DVT PROPHYLAXIS: heparin   Dispo: acute 59y old Male who presents with a chief complaint of cough and shortness of breath (03 Apr 2020 10:51). Currently admitted to medicine with the primary diagnosis of  pneumonia due to covid 19.    #COVID 19 with Hypoxemia, CXR with b/l opacities, cytokine response leading to resp failure and MV, likely with bacterial pna co-infection   -INTUBATED sedated, failed multiple SATs this week, coded x2 4/9, pt was tuning pt to give Kayexelate for hyperK+, pt's HR ryanne to 20s,lots of secretions noted, PEA, ROSC was achieved after 4 rounds of CPR and 3 epinephrines. ET tube was full of thick secretions, ? mucus plug, extubated, bagged and re-intubated, s/p atropine x2, levophed and dobutamine. Pt went into V fib and coded again, amio was given, ROSC achieved after 2 rounds of compressions and 2 epis. L Femoral line placed  -cardio c/s placed-f/u   -on propofol and precedex, will attempt to wean off propofol and inc precedex, if agitated off propofol, can start versed   -100%FiO2, 440 TV, 35/14, RR30 -> changed to FiO2 50%, 30/11   -s/p azithromycin,  anakinra 100 q6 for 3 days, hydroxychloroquine, ceftriaxone 2g q24 x7 days  -procal elevated, suggestive of a bacterial coinfection, s/p 7 d ceftriaxone   -BCx NG, MRSA nares neg   -urine strep and legionella PENDING collection   -c/w methylpred 60 IV BID  - from: Xray Chest 1 View- PORTABLE-Routine (04.10.20 @ 05:53) > Bilateral opacities, slightly improved on the left  -Triglycerides, Serum (04.10.20 @ 09:30) 375 mg/dL  -Creatine Kinase, Serum (04.10.20 @ 09:30): 117 U/L  -if spike fever, start meropenem, send deep tracheal asp (provider to RN, made aware)    #Transaminitis  -trend CMP    #hyperkalemia  -most recent K+ Potassium, Serum: 7.1 (930AM), s/p lactulose, D50 and insulin push x2, 1x zirconium   -repeat K+ 5.3, f/u repeat BMP at 4pm   -c/w insulin gtt     #hypernatremia  -D5 w 150 bicarbonate  -c/w 300cc QID     #TAMIKA, improving   -good UOP  -OUT:    Indwelling Catheter - Urethral: 800 mL  Total OUT: 800 mL  Total NET: -560 mL  -c/w D5 w 150 bicarbonate    #hyperglycemia, no known hx of DM  -high TGsl, low LDL  -Hba1c 6.2  -insulin gtt   -nephro c/s placed, senior spoke to nephro fellow     CODE: FULL  DIET: NPO WITH TUBE FEED  DVT PROPHYLAXIS: heparin   Dispo: acute, cardio recs, repeat BMP (hyperK) 59y old Male who presents with a chief complaint of cough and shortness of breath (03 Apr 2020 10:51). Currently admitted to medicine with the primary diagnosis of  pneumonia due to covid 19.    #COVID 19 with Hypoxemia, CXR with b/l opacities, cytokine response leading to resp failure and MV, likely with bacterial pna co-infection   -INTUBATED sedated, failed multiple SATs this week, coded x2 4/9, pt was tuning pt to give Kayexelate for hyperK+, pt's HR ryanne to 20s,lots of secretions noted, PEA, ROSC was achieved after 4 rounds of CPR and 3 epinephrines. ET tube was full of thick secretions, ? mucus plug, extubated, bagged and re-intubated, s/p atropine x2, levophed and dobutamine. Pt went into V fib and coded again, amio was given, ROSC achieved after 2 rounds of compressions and 2 epis. L Femoral line placed  -cardio c/s placed-f/u   -on propofol and precedex, will attempt to wean off propofol and inc precedex, if agitated off propofol, can start versed   -100%FiO2, 440 TV, 35/14, RR30 -> changed to FiO2 50%, 30/11   -s/p azithromycin,  anakinra 100 q6 for 3 days, hydroxychloroquine, ceftriaxone 2g q24 x7 days  -procal elevated, suggestive of a bacterial coinfection, s/p 7 d ceftriaxone   -BCx NG, MRSA nares neg   -urine strep and legionella PENDING collection   -c/w methylpred 60 IV BID  - from: Xray Chest 1 View- PORTABLE-Routine (04.10.20 @ 05:53) > Bilateral opacities, slightly improved on the left  -Triglycerides, Serum (04.10.20 @ 09:30) 375 mg/dL  -Creatine Kinase, Serum (04.10.20 @ 09:30): 117 U/L  -if spike fever, start meropenem, send deep tracheal asp (provider to RN, made aware)    #Transaminitis  -trend CMP    #hyperkalemia  -most recent K+ Potassium, Serum: 7.1 (930AM), s/p lactulose, D50 and insulin push x2, 1x zirconium   -repeat K+ 5.3, f/u repeat BMP at 4pm   -c/w insulin gtt     #hypernatremia  -D5 w 150 bicarbonate  -c/w 300cc QID     #TAMIKA, improving   -good UOP  -OUT:    Indwelling Catheter - Urethral: 800 mL  Total OUT: 800 mL  Total NET: -560 mL  -c/w D5 w 150 bicarbonate  -nephro signed off today (4/10)    #hyperglycemia, no known hx of DM  -high TGsl, low LDL  -Hba1c 6.2  -insulin gtt   -nephro c/s placed, senior spoke to nephro fellow     CODE: FULL  DIET: NPO WITH TUBE FEED  DVT PROPHYLAXIS: heparin   Dispo: acute, cardio recs, repeat BMP (hyperK) 59y old Male who presents with a chief complaint of cough and shortness of breath (03 Apr 2020 10:51). Currently admitted to medicine with the primary diagnosis of  pneumonia due to covid 19.    #COVID 19 with Hypoxemia, CXR with b/l opacities, cytokine response leading to resp failure and MV, likely with bacterial pna co-infection   -INTUBATED sedated, failed multiple SATs this week, coded x2 4/9, pt was tuning pt to give Kayexelate for hyperK+, pt's HR ryanne to 20s,lots of secretions noted, PEA, ROSC was achieved after 4 rounds of CPR and 3 epinephrines. ET tube was full of thick secretions, ? mucus plug, extubated, bagged and re-intubated, s/p atropine x2, levophed and dobutamine. Pt went into V fib and coded again, amio was given, ROSC achieved after 2 rounds of compressions and 2 epis. L Femoral line placed  -cardio c/s placed-f/u   -on levophed 0.04, dobutamine 5, propofol 1 and precedex; will attempt to wean off propofol and inc precedex, if agitated off propofol, can start versed   -100%FiO2, 440 TV, 35/14, RR30 -> changed to FiO2 50%, 30/11   -s/p azithromycin,  anakinra 100 q6 for 3 days, hydroxychloroquine, ceftriaxone 2g q24 x7 days  -procal elevated, suggestive of a bacterial coinfection, s/p 7 d ceftriaxone   -BCx NG, MRSA nares neg   -urine strep and legionella PENDING collection   -c/w methylpred 60 IV BID  - from: Xray Chest 1 View- PORTABLE-Routine (04.10.20 @ 05:53) > Bilateral opacities, slightly improved on the left  -Triglycerides, Serum (04.10.20 @ 09:30) 375 mg/dL  -Creatine Kinase, Serum (04.10.20 @ 09:30): 117 U/L  -if spike fever, start meropenem, send deep tracheal asp (provider to RN, made aware)    #Transaminitis  -trend CMP    #hyperkalemia  -most recent K+ Potassium, Serum: 7.1 (930AM), s/p lactulose, D50 and insulin push x2, 1x zirconium   -repeat K+ 5.3, no need for repeat BMP today, trend AM    -c/w insulin gtt     #hypernatremia  -D5 w 150 bicarbonate  -c/w 300cc QID     #TAMIKA, improving   -good UOP  -OUT:    Indwelling Catheter - Urethral: 800 mL  Total OUT: 800 mL  Total NET: -560 mL  -c/w D5 w 150 bicarbonate  -nephro:  check UA, urine creatinine, Na, [place]/Cr ratio (sent), no RRT at this time  - avoid nephrotoxins and hypotension    #hyperglycemia, no known hx of DM  -high TGsl, low LDL  -Hba1c 6.2  -insulin gtt     CODE: FULL  DIET: NPO WITH TUBE FEED  DVT PROPHYLAXIS: heparin   Dispo: acute, cardio recs 59y old Male who presents with a chief complaint of cough and shortness of breath (03 Apr 2020 10:51). Currently admitted to medicine with the primary diagnosis of  pneumonia due to covid 19.    #COVID 19 with Hypoxemia, CXR with b/l opacities, cytokine response leading to resp failure and MV, likely with bacterial pna co-infection   -INTUBATED sedated, failed multiple SATs this week, coded x2 4/9, pt was tuning pt to give Kayexelate for hyperK+, pt's HR ryanne to 20s,lots of secretions noted, PEA, ROSC was achieved after 4 rounds of CPR and 3 epinephrines. ET tube was full of thick secretions, ? mucus plug, extubated, bagged and re-intubated, s/p atropine x2, levophed and dobutamine. Pt went into V fib and coded again, amio was given, ROSC achieved after 2 rounds of compressions and 2 epis. L Femoral line placed  -cardio c/s placed-f/u : fellow Samer rec d/c'ing dobutamine gtt, d/c'ed ~455pm, f/u addl recs  -on levophed 0.04, dobutamine 5, propofol 1 and precedex; will attempt to wean off propofol and inc precedex, if agitated off propofol, can start versed   -100%FiO2, 440 TV, 35/14, RR30 -> changed to FiO2 50%, 30/11   -s/p azithromycin,  anakinra 100 q6 for 3 days, hydroxychloroquine, ceftriaxone 2g q24 x7 days  -procal elevated, suggestive of a bacterial coinfection, s/p 7 d ceftriaxone   -BCx NG, MRSA nares neg   -urine strep and legionella PENDING collection   -c/w methylpred 60 IV BID  - from: Xray Chest 1 View- PORTABLE-Routine (04.10.20 @ 05:53) > Bilateral opacities, slightly improved on the left  -Triglycerides, Serum (04.10.20 @ 09:30) 375 mg/dL  -Creatine Kinase, Serum (04.10.20 @ 09:30): 117 U/L  -if spike fever, start meropenem, send deep tracheal asp (provider to RN, made aware)    #Transaminitis  -trend CMP    #hyperkalemia  -most recent K+ Potassium, Serum: 7.1 (930AM), s/p lactulose, D50 and insulin push x2, 1x zirconium   -repeat K+ 5.3, no need for repeat BMP today, trend AM    -c/w insulin gtt     #hypernatremia  -D5 w 150 bicarbonate  -c/w 300cc QID     #TAMIKA, improving   -good UOP  -OUT:    Indwelling Catheter - Urethral: 800 mL  Total OUT: 800 mL  Total NET: -560 mL  -c/w D5 w 150 bicarbonate  -nephro:  check UA, urine creatinine, Na, [place]/Cr ratio (sent), no RRT at this time  - avoid nephrotoxins and hypotension    #hyperglycemia, no known hx of DM  -high TGsl, low LDL  -Hba1c 6.2  -insulin gtt     CODE: FULL  DIET: NPO WITH TUBE FEED  DVT PROPHYLAXIS: heparin   Dispo: acute, cardio recs 59y old Male who presents with a chief complaint of cough and shortness of breath (03 Apr 2020 10:51). Currently admitted to medicine with the primary diagnosis of  pneumonia due to covid 19.    #COVID 19 with Hypoxemia, CXR with b/l opacities, cytokine response leading to resp failure and MV, likely with bacterial pna co-infection   -INTUBATED sedated, failed multiple SATs this week, coded x2 4/9, pt was tuning pt to give Kayexelate for hyperK+, pt's HR ryanne to 20s,lots of secretions noted, PEA, ROSC was achieved after 4 rounds of CPR and 3 epinephrines. ET tube was full of thick secretions, ? mucus plug, extubated, bagged and re-intubated, s/p atropine x2, levophed and dobutamine. Pt went into V fib and coded again, amio was given, ROSC achieved after 2 rounds of compressions and 2 epis. L Femoral line placed  -cardio c/s placed-f/u: fellow Samer rec d/c'ing dobutamine gtt, d/c'ed ~455pm, f/u addl recs  -on levophed 0.04, dobutamine 5, propofol 1 and precedex; will attempt to wean off propofol and inc precedex, if agitated off propofol, can start versed   -100%FiO2, 440 TV, 35/14, RR30 -> changed to FiO2 50%, 30/11   -s/p azithromycin,  anakinra 100 q6 for 3 days, hydroxychloroquine, ceftriaxone 2g q24 x7 days  -procal elevated, suggestive of a bacterial coinfection, s/p 7 d ceftriaxone   -BCx NG, MRSA nares neg   -urine strep and legionella PENDING collection   -c/w methylpred 60 IV BID  - from: Xray Chest 1 View- PORTABLE-Routine (04.10.20 @ 05:53) > Bilateral opacities, slightly improved on the left  -Triglycerides, Serum (04.10.20 @ 09:30) 375 mg/dL  -Creatine Kinase, Serum (04.10.20 @ 09:30): 117 U/L  -if spike fever, start meropenem, send deep tracheal asp (provider to RN, made aware)    #Transaminitis  -trend CMP    #hyperkalemia  -most recent K+ Potassium, Serum: 7.1 (930AM), s/p lactulose, D50 and insulin push x2, 1x zirconium   -repeat K+ 5.3, no need for repeat BMP today, trend AM    -c/w insulin gtt     #hypernatremia  -D5 w 150 bicarbonate  -c/w 300cc QID     #TAMIKA, improving   -good UOP  -OUT:    Indwelling Catheter - Urethral: 800 mL  Total OUT: 800 mL  Total NET: -560 mL  -c/w D5 w 150 bicarbonate  -nephro: check UA, urine creatinine, Na, [place]/Cr ratio (sent), no RRT at this time  - avoid nephrotoxins and hypotension    #hyperglycemia, no known hx of DM  -high TGsl, low LDL  -Hba1c 6.2  -insulin gtt     CODE: FULL  DIET: NPO WITH TUBE FEED  DVT PROPHYLAXIS: heparin   Dispo: acute, cardio recs 59y old Male who presents with a chief complaint of cough and shortness of breath (03 Apr 2020 10:51). Currently admitted to medicine with the primary diagnosis of  pneumonia due to covid 19.    #COVID 19 with Hypoxemia, CXR with b/l opacities, cytokine response leading to resp failure and MV, likely with bacterial pna co-infection   -INTUBATED sedated, failed multiple SATs this week, coded x2 4/9, pt was tuning pt to give Kayexelate for hyperK+, pt's HR ryanne to 20s,lots of secretions noted, PEA, ROSC was achieved after 4 rounds of CPR and 3 epinephrines. ET tube was full of thick secretions, ? mucus plug, extubated, bagged and re-intubated, s/p atropine x2, levophed and dobutamine. Pt went into V fib and coded again, amio was given, ROSC achieved after 2 rounds of compressions and 2 epis. L Femoral line placed  -cardio c/s placed-f/u: fellow Samer rec d/c'ing dobutamine gtt, d/c'ed ~455pm, f/u addl recs  -on levophed 0.04, dobutamine 5, propofol 1 and precedex; will attempt to wean off propofol and inc precedex, if agitated off propofol, can start versed   -100%FiO2, 440 TV, 35/14, RR30 -> changed to FiO2 50%, 30/11   -s/p azithromycin,  anakinra 100 q6 for 3 days, hydroxychloroquine, ceftriaxone 2g q24 x7 days  -procal elevated, suggestive of a bacterial coinfection, s/p 7 d ceftriaxone   -BCx NG, MRSA nares neg   -urine strep and legionella PENDING collection   -c/w methylpred 60 IV BID  - from: Xray Chest 1 View- PORTABLE-Routine (04.10.20 @ 05:53) > Bilateral opacities, slightly improved on the left  -Triglycerides, Serum (04.10.20 @ 09:30) 375 mg/dL  -Creatine Kinase, Serum (04.10.20 @ 09:30): 117 U/L  -if spike fever, start meropenem, send deep tracheal asp (provider to RN, made aware)    #Transaminitis  -trend CMP    #hyperkalemia  -most recent K+ Potassium, Serum: 7.1 (930AM), s/p lactulose, D50 and insulin push x2, 1x zirconium   -repeat K+ 5.3, repeat 4pm BMPK+ of 6.8, stat 250 cc D10, 10U insulin, Na zirconium, trend 2330 BMP    -c/w insulin gtt     #hypernatremia  -D5 w 150 bicarbonate  -c/w 300cc QID     #TAMIKA, improving   -good UOP  -OUT:    Indwelling Catheter - Urethral: 800 mL  Total OUT: 800 mL  Total NET: -560 mL  -c/w D5 w 150 bicarbonate  -nephro: check UA, urine creatinine, Na, [place]/Cr ratio (sent), no RRT at this time  - avoid nephrotoxins and hypotension    #hyperglycemia, no known hx of DM  -high TGsl, low LDL  -Hba1c 6.2  -insulin gtt     CODE: FULL  DIET: NPO WITH TUBE FEED  DVT PROPHYLAXIS: heparin   Dispo: acute, cardio recs 59y old Male who presents with a chief complaint of cough and shortness of breath (03 Apr 2020 10:51). Currently admitted to medicine with the primary diagnosis of  pneumonia due to covid 19.    #COVID 19 with Hypoxemia, CXR with b/l opacities, cytokine response leading to resp failure and MV, likely with bacterial pna co-infection   -INTUBATED sedated, failed multiple SATs this week, coded x2 4/9, pt was tuning pt to give Kayexelate for hyperK+, pt's HR ryanne to 20s,lots of secretions noted, PEA, ROSC was achieved after 4 rounds of CPR and 3 epinephrines. ET tube was full of thick secretions, ? mucus plug, extubated, bagged and re-intubated, s/p atropine x2, levophed and dobutamine. Pt went into V fib and coded again, amio was given, ROSC achieved after 2 rounds of compressions and 2 epis. L Femoral line placed  -cardio c/s placed-f/u: fellow Samer rec d/c'ing dobutamine gtt, d/c'ed ~455pm, f/u addl recs  -on levophed 0.04, dobutamine 5, propofol 1 and precedex; will attempt to wean off propofol and inc precedex, if agitated off propofol, can start versed   -100%FiO2, 440 TV, 35/14, RR30 -> changed to FiO2 50%, 30/11   -s/p azithromycin,  anakinra 100 q6 for 3 days, hydroxychloroquine, ceftriaxone 2g q24 x7 days  -procal elevated, suggestive of a bacterial coinfection, s/p 7 d ceftriaxone   -BCx NG, MRSA nares neg   -urine strep and legionella PENDING collection   -c/w methylpred 60 IV BID  - from: Xray Chest 1 View- PORTABLE-Routine (04.10.20 @ 05:53) > Bilateral opacities, slightly improved on the left  -Triglycerides, Serum (04.10.20 @ 09:30) 375 mg/dL  -Creatine Kinase, Serum (04.10.20 @ 09:30): 117 U/L  -if spike fever, start meropenem, send deep tracheal asp (provider to RN, made aware)    #Transaminitis  -trend CMP    #hyperkalemia  -most recent K+ Potassium, Serum: 7.1 (930AM), s/p lactulose, D50 and insulin push x2, 1x zirconium   -repeat K+ 5.3, repeat 4pm BMPK+ of 6.8, stat 250 cc D10, 10U insulin, Na zirconium, trend 2330 BMP , diet changed to renal   -c/w insulin gtt     #hypernatremia  -D5 w 150 bicarbonate  -c/w 300cc QID     #TAMIKA, improving   -good UOP  -OUT:    Indwelling Catheter - Urethral: 800 mL  Total OUT: 800 mL  Total NET: -560 mL  -c/w D5 w 150 bicarbonate  -nephro: check UA, urine creatinine, Na, [place]/Cr ratio (sent), no RRT at this time  - avoid nephrotoxins and hypotension    #hyperglycemia, no known hx of DM  -high TGsl, low LDL  -Hba1c 6.2  -insulin gtt     CODE: FULL  DIET: NPO WITH TUBE FEED-renal   DVT PROPHYLAXIS: heparin   Dispo: acute, cardio recs

## 2020-04-10 NOTE — PROGRESS NOTE ADULT - SUBJECTIVE AND OBJECTIVE BOX
Patient is a 59y old  Male who presents with a chief complaint of ARF (09 Apr 2020 10:59)      Over Night Events:  Patient seen and examined.   yesterday as per the team that late evening while the patient turned for cleaning   he start to desaturation , bradycardia   the resp therapist said high peak pressure not able to suction   so anesthesia was called start CPR et tube was removed was full with mucus plug as per resp therapist   s/p reintubation and CPR     ROS:  See HPI    PHYSICAL EXAM    ICU Vital Signs Last 24 Hrs  T(C): 37.8 (10 Apr 2020 08:00), Max: 37.8 (10 Apr 2020 08:00)  T(F): 100 (10 Apr 2020 08:00), Max: 100 (10 Apr 2020 08:00)  HR: 104 (10 Apr 2020 08:00) (86 - 108)  BP: 147/76 (09 Apr 2020 19:40) (125/70 - 166/88)  BP(mean): --  ABP: 140/64 (10 Apr 2020 08:00) (110/64 - 140/64)  ABP(mean): --  RR: 35 (10 Apr 2020 08:00) (24 - 35)  SpO2: 100% (10 Apr 2020 08:00) (96% - 100%)      General:  HEENT:                Lymph Nodes: NO cervical LN   Lungs: Bilateral BS  Cardiovascular: Regular   Abdomen: Soft, Positive BS  Extremities: No clubbing   Skin: warm   Neurological:   Musculoskeletal: move all ext     I&O's Detail    09 Apr 2020 07:01  -  10 Apr 2020 07:00  --------------------------------------------------------  IN:    Osmolite: 240 mL  Total IN: 240 mL    OUT:    Indwelling Catheter - Urethral: 800 mL  Total OUT: 800 mL    Total NET: -560 mL      10 Apr 2020 07:01  -  10 Apr 2020 11:22  --------------------------------------------------------  IN:    Osmolite: 240 mL  Total IN: 240 mL    OUT:  Total OUT: 0 mL    Total NET: 240 mL          LABS:                          11.7   21.45 )-----------( 251      ( 10 Apr 2020 04:30 )             35.4         10 Apr 2020 09:30    146    |  109    |  102    ----------------------------<  255    7.1     |  24     |  3.0      Ca    9.2        10 Apr 2020 09:30  Mg     3.3       10 Apr 2020 04:30    TPro  5.9    /  Alb  2.6    /  TBili  0.5    /  DBili  x      /  AST  101    /  ALT  246    /  AlkPhos  75     10 Apr 2020 09:30  Amylase x     lipase x                                                                                              CARDIAC MARKERS ( 10 Apr 2020 09:30 )  x     / x     / 117 U/L / x     / x      CARDIAC MARKERS ( 09 Apr 2020 05:48 )  x     / x     / 60 U/L / x     / x                                                                                                                                             ABG - ( 10 Apr 2020 08:27 )  pH, Arterial: 7.31  pH, Blood: x     /  pCO2: 49    /  pO2: 298   / HCO3: 25    / Base Excess: -1.9  /  SaO2: 100                 MEDICATIONS  (STANDING):  chlorhexidine 0.12% Liquid 15 milliLiter(s) Oral Mucosa every 12 hours  chlorhexidine 4% Liquid 1 Application(s) Topical <User Schedule>  dexMEDEtomidine Infusion 0.2 MICROgram(s)/kG/Hr (3.77 mL/Hr) IV Continuous <Continuous>  dextrose 10%. 250 milliLiter(s) (250 mL/Hr) IV Continuous <Continuous>  dextrose 10%. 250 milliLiter(s) (500 mL/Hr) IV Continuous <Continuous>  dextrose 5% 1000 milliLiter(s) (75 mL/Hr) IV Continuous <Continuous>  dextrose 5%. 1000 milliLiter(s) (50 mL/Hr) IV Continuous <Continuous>  dextrose 50% Injectable 12.5 Gram(s) IV Push once  dextrose 50% Injectable 25 Gram(s) IV Push once  dextrose 50% Injectable 25 Gram(s) IV Push once  dextrose 50% Injectable 50 milliLiter(s) IV Push once  dextrose 50% Injectable 50 milliLiter(s) IV Push once  DOBUTamine Infusion 2.5 MICROgram(s)/kG/Min (5.66 mL/Hr) IV Continuous <Continuous>  heparin  Injectable 5000 Unit(s) SubCutaneous every 8 hours  influenza   Vaccine 0.5 milliLiter(s) IntraMuscular once  insulin regular  human recombinant. 10 Unit(s) IV Push once  insulin regular  human recombinant. 10 Unit(s) IV Push once  insulin regular Infusion 2 Unit(s)/Hr (2 mL/Hr) IV Continuous <Continuous>  methylPREDNISolone sodium succinate Injectable 60 milliGRAM(s) IV Push two times a day  norepinephrine Infusion 1.5 MICROgram(s)/kG/Min (106 mL/Hr) IV Continuous <Continuous>  propofol Infusion 5 MICROgram(s)/kG/Min (2.26 mL/Hr) IV Continuous <Continuous>  QUEtiapine 50 milliGRAM(s) Oral daily  sodium polystyrene sulfonate Suspension 15 Gram(s) Oral once    MEDICATIONS  (PRN):  acetaminophen   Tablet .. 650 milliGRAM(s) Oral every 6 hours PRN Temp greater or equal to 38C (100.4F)  dextrose 40% Gel 15 Gram(s) Oral once PRN Blood Glucose LESS THAN 70 milliGRAM(s)/deciliter  glucagon  Injectable 1 milliGRAM(s) IntraMuscular once PRN Glucose LESS THAN 70 milligrams/deciliter          Xrays:  TLC:  OG:  ET tube:                                                                                    b/l opacity stable    ECHO:  CAM ICU:

## 2020-04-10 NOTE — PROGRESS NOTE ADULT - SUBJECTIVE AND OBJECTIVE BOX
AMAYA ORTEGA  59y, Male    All available historical data reviewed    OVERNIGHT EVENTS:  fio2 100%    ROS:  unable to obtain history secondary to patient's mental status and/or sedation     VITALS:  T(F): 100, Max: 100 (04-10-20 @ 08:00)  HR: 112  BP: 147/76  RR: 35Vital Signs Last 24 Hrs  T(C): 37.8 (10 Apr 2020 08:00), Max: 37.8 (10 Apr 2020 08:00)  T(F): 100 (10 Apr 2020 08:00), Max: 100 (10 Apr 2020 08:00)  HR: 112 (10 Apr 2020 12:01) (86 - 112)  BP: 147/76 (09 Apr 2020 19:40) (147/76 - 166/88)  BP(mean): --  RR: 35 (10 Apr 2020 08:00) (30 - 35)  SpO2: 100% (10 Apr 2020 12:01) (96% - 100%)    TESTS & MEASUREMENTS:                        11.7   21.45 )-----------( 251      ( 10 Apr 2020 04:30 )             35.4     04-10    148<H>  |  118<H>  |  82<HH>  ----------------------------<  202<H>  5.3<H>   |  18  |  2.5<H>    Ca    6.6<L>      10 Apr 2020 13:10  Mg     3.3     04-10    TPro  5.9<L>  /  Alb  2.6<L>  /  TBili  0.5  /  DBili  x   /  AST  101<H>  /  ALT  246<H>  /  AlkPhos  75  04-10    LIVER FUNCTIONS - ( 10 Apr 2020 09:30 )  Alb: 2.6 g/dL / Pro: 5.9 g/dL / ALK PHOS: 75 U/L / ALT: 246 U/L / AST: 101 U/L / GGT: x                   RADIOLOGY & ADDITIONAL TESTS:  Personal review of radiological diagnostics performed  Echo and EKG results noted when applicable.     MEDICATIONS:  acetaminophen   Tablet .. 650 milliGRAM(s) Oral every 6 hours PRN  chlorhexidine 0.12% Liquid 15 milliLiter(s) Oral Mucosa every 12 hours  chlorhexidine 4% Liquid 1 Application(s) Topical <User Schedule>  dexMEDEtomidine Infusion 0.2 MICROgram(s)/kG/Hr IV Continuous <Continuous>  dextrose 10%. 250 milliLiter(s) IV Continuous <Continuous>  dextrose 10%. 250 milliLiter(s) IV Continuous <Continuous>  dextrose 40% Gel 15 Gram(s) Oral once PRN  dextrose 5% 1000 milliLiter(s) IV Continuous <Continuous>  dextrose 5%. 1000 milliLiter(s) IV Continuous <Continuous>  dextrose 50% Injectable 12.5 Gram(s) IV Push once  dextrose 50% Injectable 25 Gram(s) IV Push once  dextrose 50% Injectable 25 Gram(s) IV Push once  dextrose 50% Injectable 50 milliLiter(s) IV Push once  dextrose 50% Injectable 50 milliLiter(s) IV Push once  DOBUTamine Infusion 2.5 MICROgram(s)/kG/Min IV Continuous <Continuous>  glucagon  Injectable 1 milliGRAM(s) IntraMuscular once PRN  heparin  Injectable 5000 Unit(s) SubCutaneous every 8 hours  influenza   Vaccine 0.5 milliLiter(s) IntraMuscular once  insulin regular  human recombinant. 10 Unit(s) IV Push once  insulin regular Infusion 2 Unit(s)/Hr IV Continuous <Continuous>  methylPREDNISolone sodium succinate Injectable 60 milliGRAM(s) IV Push two times a day  norepinephrine Infusion 1.5 MICROgram(s)/kG/Min IV Continuous <Continuous>  propofol Infusion 5 MICROgram(s)/kG/Min IV Continuous <Continuous>  QUEtiapine 50 milliGRAM(s) Oral daily  sodium zirconium cyclosilicate 10 Gram(s) Oral once      ANTIBIOTICS:

## 2020-04-10 NOTE — PROGRESS NOTE ADULT - ASSESSMENT
· Assessment		  59M from home, lives with family, with no pertinent PMHx or PSHx presents to the ED with complaints of one week of a cough and shortness of breath.    IMPRESSION;   COVID19 with resolving septic shock, off  pressors, mechanical ventilation with ARDS with FiO2 40%, secondary to Cytokine Release Syndrome  -end organ damage with renal failure and mild transaminitis  -CXR with b/l opacities  -inflammatory markers significantly elevated. d Dimer [id number]  -elevated Ddimer and Ferritin are poor prognostic indicators and differentiate survivors from non survivors  procal of 1.5 suggestive of a bacterial coinfection  BCx NG  WBc 21.4   elmer WALLACE    RECOMMENDATIONS;  -Rocephin 2 gm iv q24h   s/p PLAQUENIL

## 2020-04-10 NOTE — CONSULT NOTE ADULT - SUBJECTIVE AND OBJECTIVE BOX
NEPHROLOGY CONSULTATION NOTE    Pt is inutbated on vent. hx taken from chart.    59M from home, lives with family, with no pertinent PMHx or PSHx presents to the ED with complaints of one week of a cough and shortness of breath. Patient reports some associated nausea, vomiting, diarrhea, generalized weakness, and body aches with his symptoms. Patient denies any recent known sick contact, travel, fever, chills, urinary complaints. admitted to floor COVID +, today worsening SOB, oxygenation on NRM, called to evaluate. (4/2/2020)    INTERVAL EVENTS: On 4/9/2020: coded x2,HR in the 20s,PEA, ROSC was achieved after 4 rounds of CPR and 3 epinephrines. ET tube was full of thick secretions, ?mucus plug?, extubated, bagged and re-intubated, s/p atropine x2, levophed and dobutamine. Pt went into V fib and coded again, amio was given, ROSC achieved after 2 rounds of compressions and 2 epis. L Femoral line placed.      PAST MEDICAL & SURGICAL HISTORY:  No significant past surgical history    Allergies:  No Known Allergies    Home Medications:  Vitamin D3 50,000 intl units (1250 mcg) oral capsule: 1 cap(s) orally once a week (31 Mar 2020 23:18)    Hospital Medications:   MEDICATIONS  (STANDING):  chlorhexidine 0.12% Liquid 15 milliLiter(s) Oral Mucosa every 12 hours  chlorhexidine 4% Liquid 1 Application(s) Topical <User Schedule>  dexMEDEtomidine Infusion 0.2 MICROgram(s)/kG/Hr (3.77 mL/Hr) IV Continuous <Continuous>  DOBUTamine Infusion 2.5 MICROgram(s)/kG/Min (5.66 mL/Hr) IV Continuous <Continuous>  heparin  Injectable 5000 Unit(s) SubCutaneous every 8 hours  influenza   Vaccine 0.5 milliLiter(s) IntraMuscular once  insulin regular  human recombinant. 10 Unit(s) IV Push once  insulin regular Infusion 2 Unit(s)/Hr (2 mL/Hr) IV Continuous <Continuous>  methylPREDNISolone sodium succinate Injectable 60 milliGRAM(s) IV Push two times a day  norepinephrine Infusion 1.5 MICROgram(s)/kG/Min (106 mL/Hr) IV Continuous <Continuous>  propofol Infusion 5 MICROgram(s)/kG/Min (2.26 mL/Hr) IV Continuous <Continuous>  QUEtiapine 50 milliGRAM(s) Oral daily  sodium zirconium cyclosilicate 10 Gram(s) Oral once      SOCIAL HISTORY:  Denies ETOH,Smoking,   FAMILY HISTORY:        REVIEW OF SYSTEMS:    All other review of systems is negative unless indicated above.    VITALS:  T(F): 100 (04-10-20 @ 08:00), Max: 100 (04-10-20 @ 08:00)  HR: 112 (04-10-20 @ 12:01)  BP: 140/64 (04-10-20 @ 12:01)  RR: 35 (04-10-20 @ 08:00)  SpO2: 100% (04-10-20 @ 12:01)    04-09 @ 07:01  -  04-10 @ 07:00  --------------------------------------------------------  IN: 240 mL / OUT: 800 mL / NET: -560 mL    04-10 @ 07:01  -  04-10 @ 14:21  --------------------------------------------------------  IN: 240 mL / OUT: 0 mL / NET: 240 mL        04-09-20 @ 07:01  -  04-10-20 @ 07:00  --------------------------------------------------------  IN: 0 mL / OUT: 800 mL / NET: -800 mL      I&O's Detail    09 Apr 2020 07:01  -  10 Apr 2020 07:00  --------------------------------------------------------  IN:    Osmolite: 240 mL  Total IN: 240 mL    OUT:    Indwelling Catheter - Urethral: 800 mL  Total OUT: 800 mL    Total NET: -560 mL      10 Apr 2020 07:01  -  10 Apr 2020 14:21  --------------------------------------------------------  IN:    Osmolite: 240 mL  Total IN: 240 mL    OUT:  Total OUT: 0 mL    Total NET: 240 mL        Creatine Kinase, Serum: 117 U/L (04-10-20 @ 09:30)      PHYSICAL EXAM:  Constitutional: intubated on vent  Respiratory: on MV, b/l rhonchi  Cardiovascular: S1, S2, RRR  Gastrointestinal: BS+, soft, NT/ND  Extremities: No peripheral edema  : + syed.     Vascular Access:    LABS:  04-10    148<H>  |  118<H>  |  82<HH>  ----------------------------<  202<H>  5.3<H>   |  18  |  2.5<H>    Creatinine Trend: 2.5<--, 3.0<--, 2.9<--, 2.6<--, 2.6<--, 2.3<--    Ca    6.6<L>      10 Apr 2020 13:10  Mg     3.3     04-10    TPro  5.9<L>  /  Alb  2.6<L>  /  TBili  0.5  /  DBili      /  AST  101<H>  /  ALT  246<H>  /  AlkPhos  75  04-10    Creatinine Trend: 2.5 <--, 3.0 <--, 2.9 <--, 2.6 <--, 2.6 <--, 2.3 <--, 2.5 <--, 2.7 <--, 3.3 <--, 3.1 <--, 3.1 <--, 2.0 <--, 1.0 <--, 1.0 <--, 1.10 <--                        11.7   21.45 )-----------( 251      ( 10 Apr 2020 04:30 )             35.4     Blood Gas Profile - Arterial (04.10.20 @ 08:27)    pH, Arterial: 7.31    pCO2, Arterial: 49 mmHg    pO2, Arterial: 298 mmHg    HCO3, Arterial: 25 mmoL/L    Base Excess, Arterial: -1.9 mmoL/L    Oxygen Saturation, Arterial: 100 %    Creatine Kinase, Serum: 117 U/L (04.10.20 @ 09:30)    COVID-19 PCR: Positive  (03.31.20 @ 13:08)    Urine Studies:    RADIOLOGY & ADDITIONAL STUDIES:  < from: Xray Chest 1 View- PORTABLE-Routine (04.10.20 @ 05:53) >  Impression:      1. Support apparatus as above.  2. Bilateral opacities, slightly improved on the left.    < end of copied text >

## 2020-04-10 NOTE — PROGRESS NOTE ADULT - SUBJECTIVE AND OBJECTIVE BOX
SUBJECTIVE:    Patient is a 59y old Male who presents with a chief complaint of ARF (09 Apr 2020 10:59)    Today is hospital day 10d. intubated, sedated    INTERVAL EVENTS: coded x2,HR in the 20s,PEA, ROSC was achieved after 4 rounds of CPR and 3 epinephrines. ET tube was full of thick secretions, extubated, bagged and re-intubated, s/p atropine x2, levophed and dobutamine. Pt went into V fib and coded again, amio was given, ROSC achieved after 2 rounds of compressions and 2 epis. L Femoral line placed.    PAST MEDICAL & SURGICAL HISTORY  No significant past surgical history    ALLERGIES:  No Known Allergies    MEDICATIONS:  STANDING MEDICATIONS  chlorhexidine 0.12% Liquid 15 milliLiter(s) Oral Mucosa every 12 hours  chlorhexidine 4% Liquid 1 Application(s) Topical <User Schedule>  dexMEDEtomidine Infusion 0.2 MICROgram(s)/kG/Hr IV Continuous <Continuous>  dextrose 10%. 250 milliLiter(s) IV Continuous <Continuous>  dextrose 10%. 250 milliLiter(s) IV Continuous <Continuous>  dextrose 5% 1000 milliLiter(s) IV Continuous <Continuous>  dextrose 5%. 1000 milliLiter(s) IV Continuous <Continuous>  dextrose 50% Injectable 12.5 Gram(s) IV Push once  dextrose 50% Injectable 25 Gram(s) IV Push once  dextrose 50% Injectable 25 Gram(s) IV Push once  dextrose 50% Injectable 50 milliLiter(s) IV Push once  DOBUTamine Infusion 2.5 MICROgram(s)/kG/Min IV Continuous <Continuous>  heparin  Injectable 5000 Unit(s) SubCutaneous every 8 hours  influenza   Vaccine 0.5 milliLiter(s) IntraMuscular once  insulin regular  human recombinant. 10 Unit(s) IV Push once  insulin regular  human recombinant. 10 Unit(s) IV Push once  insulin regular Infusion 2 Unit(s)/Hr IV Continuous <Continuous>  methylPREDNISolone sodium succinate Injectable 60 milliGRAM(s) IV Push two times a day  norepinephrine Infusion 1.5 MICROgram(s)/kG/Min IV Continuous <Continuous>  propofol Infusion 5 MICROgram(s)/kG/Min IV Continuous <Continuous>  QUEtiapine 50 milliGRAM(s) Oral daily  sodium polystyrene sulfonate Suspension 15 Gram(s) Oral once    PRN MEDICATIONS  acetaminophen   Tablet .. 650 milliGRAM(s) Oral every 6 hours PRN  dextrose 40% Gel 15 Gram(s) Oral once PRN  glucagon  Injectable 1 milliGRAM(s) IntraMuscular once PRN    VITALS:   T(F): 99  HR: 105  BP: 147/76  RR: 35  SpO2: 100%    LABS:                        11.7   21.45 )-----------( 251      ( 10 Apr 2020 04:30 )             35.4     04-10    148<H>  |  111<H>  |  101<HH>  ----------------------------<  273<H>  7.0<HH>   |  22  |  2.9<H>    Ca    9.6      10 Apr 2020 04:30  Mg     3.3     04-10    TPro  5.5<L>  /  Alb  2.5<L>  /  TBili  0.5  /  DBili  x   /  AST  123<H>  /  ALT  236<H>  /  AlkPhos  73  04-10    ABG - ( 10 Apr 2020 08:27 )  pH, Arterial: 7.31  pH, Blood: x     /  pCO2: 49    /  pO2: 298   / HCO3: 25    / Base Excess: -1.9  /  SaO2: 100       CARDIAC MARKERS ( 09 Apr 2020 05:48 )  x     / x     / 60 U/L / x     / x        RADIOLOGY:  < from: Xray Chest 1 View- PORTABLE-Routine (04.10.20 @ 05:53) >    1. Support apparatus as above.  2. Bilateral opacities, slightly improved on the left.    PHYSICAL EXAM:  GEN: intubated, sedated    Patino Catheter  I&O's Detail    09 Apr 2020 07:01  -  10 Apr 2020 07:00  --------------------------------------------------------  IN:    Osmolite: 240 mL  Total IN: 240 mL    OUT:    Indwelling Catheter - Urethral: 800 mL  Total OUT: 800 mL    Total NET: -560 mL      10 Apr 2020 07:01  -  10 Apr 2020 10:35  --------------------------------------------------------  IN:    Osmolite: 240 mL  Total IN: 240 mL    OUT:  Total OUT: 0 mL    Total NET: 240 mL SUBJECTIVE:    Patient is a 59y old Male who presents with a chief complaint of ARF (09 Apr 2020 10:59)    Today is hospital day 10d. intubated, sedated    INTERVAL EVENTS: coded x2,HR in the 20s,PEA, ROSC was achieved after 4 rounds of CPR and 3 epinephrines. ET tube was full of thick secretions, ?mucus plug?, extubated, bagged and re-intubated, s/p atropine x2, levophed and dobutamine. Pt went into V fib and coded again, amio was given, ROSC achieved after 2 rounds of compressions and 2 epis. L Femoral line placed.    PAST MEDICAL & SURGICAL HISTORY  No significant past surgical history    ALLERGIES:  No Known Allergies    MEDICATIONS:  STANDING MEDICATIONS  chlorhexidine 0.12% Liquid 15 milliLiter(s) Oral Mucosa every 12 hours  chlorhexidine 4% Liquid 1 Application(s) Topical <User Schedule>  dexMEDEtomidine Infusion 0.2 MICROgram(s)/kG/Hr IV Continuous <Continuous>  dextrose 10%. 250 milliLiter(s) IV Continuous <Continuous>  dextrose 10%. 250 milliLiter(s) IV Continuous <Continuous>  dextrose 5% 1000 milliLiter(s) IV Continuous <Continuous>  dextrose 5%. 1000 milliLiter(s) IV Continuous <Continuous>  dextrose 50% Injectable 12.5 Gram(s) IV Push once  dextrose 50% Injectable 25 Gram(s) IV Push once  dextrose 50% Injectable 25 Gram(s) IV Push once  dextrose 50% Injectable 50 milliLiter(s) IV Push once  DOBUTamine Infusion 2.5 MICROgram(s)/kG/Min IV Continuous <Continuous>  heparin  Injectable 5000 Unit(s) SubCutaneous every 8 hours  influenza   Vaccine 0.5 milliLiter(s) IntraMuscular once  insulin regular  human recombinant. 10 Unit(s) IV Push once  insulin regular  human recombinant. 10 Unit(s) IV Push once  insulin regular Infusion 2 Unit(s)/Hr IV Continuous <Continuous>  methylPREDNISolone sodium succinate Injectable 60 milliGRAM(s) IV Push two times a day  norepinephrine Infusion 1.5 MICROgram(s)/kG/Min IV Continuous <Continuous>  propofol Infusion 5 MICROgram(s)/kG/Min IV Continuous <Continuous>  QUEtiapine 50 milliGRAM(s) Oral daily  sodium polystyrene sulfonate Suspension 15 Gram(s) Oral once    PRN MEDICATIONS  acetaminophen   Tablet .. 650 milliGRAM(s) Oral every 6 hours PRN  dextrose 40% Gel 15 Gram(s) Oral once PRN  glucagon  Injectable 1 milliGRAM(s) IntraMuscular once PRN    VITALS:   T(F): 99  HR: 105  BP: 147/76  RR: 35  SpO2: 100%    LABS:                        11.7   21.45 )-----------( 251      ( 10 Apr 2020 04:30 )             35.4     04-10    148<H>  |  111<H>  |  101<HH>  ----------------------------<  273<H>  7.0<HH>   |  22  |  2.9<H>    Ca    9.6      10 Apr 2020 04:30  Mg     3.3     04-10    TPro  5.5<L>  /  Alb  2.5<L>  /  TBili  0.5  /  DBili  x   /  AST  123<H>  /  ALT  236<H>  /  AlkPhos  73  04-10    ABG - ( 10 Apr 2020 08:27 )  pH, Arterial: 7.31  pH, Blood: x     /  pCO2: 49    /  pO2: 298   / HCO3: 25    / Base Excess: -1.9  /  SaO2: 100       CARDIAC MARKERS ( 09 Apr 2020 05:48 )  x     / x     / 60 U/L / x     / x        RADIOLOGY:  < from: Xray Chest 1 View- PORTABLE-Routine (04.10.20 @ 05:53) >    1. Support apparatus as above.  2. Bilateral opacities, slightly improved on the left.    PHYSICAL EXAM:  GEN: intubated, sedated    Patino Catheter  I&O's Detail    09 Apr 2020 07:01  -  10 Apr 2020 07:00  --------------------------------------------------------  IN:    Osmolite: 240 mL  Total IN: 240 mL    OUT:    Indwelling Catheter - Urethral: 800 mL  Total OUT: 800 mL    Total NET: -560 mL      10 Apr 2020 07:01  -  10 Apr 2020 10:35  --------------------------------------------------------  IN:    Osmolite: 240 mL  Total IN: 240 mL    OUT:  Total OUT: 0 mL    Total NET: 240 mL SUBJECTIVE:    Patient is a 59y old Male who presents with a chief complaint of ARF (09 Apr 2020 10:59)    Today is hospital day 10d. intubated, sedated    INTERVAL EVENTS: coded x2,HR in the 20s,PEA, ROSC was achieved after 4 rounds of CPR and 3 epinephrines. ET tube was full of thick secretions, ?mucus plug?, extubated, bagged and re-intubated, s/p atropine x2, levophed and dobutamine. Pt went into V fib and coded again, amio was given, ROSC achieved after 2 rounds of compressions and 2 epis. L Femoral line placed.    PAST MEDICAL & SURGICAL HISTORY  No significant past surgical history    ALLERGIES:  No Known Allergies    MEDICATIONS:  STANDING MEDICATIONS  chlorhexidine 0.12% Liquid 15 milliLiter(s) Oral Mucosa every 12 hours  chlorhexidine 4% Liquid 1 Application(s) Topical <User Schedule>  dexMEDEtomidine Infusion 0.2 MICROgram(s)/kG/Hr IV Continuous <Continuous>  dextrose 10%. 250 milliLiter(s) IV Continuous <Continuous>  dextrose 10%. 250 milliLiter(s) IV Continuous <Continuous>  dextrose 5% 1000 milliLiter(s) IV Continuous <Continuous>  dextrose 5%. 1000 milliLiter(s) IV Continuous <Continuous>  dextrose 50% Injectable 12.5 Gram(s) IV Push once  dextrose 50% Injectable 25 Gram(s) IV Push once  dextrose 50% Injectable 25 Gram(s) IV Push once  dextrose 50% Injectable 50 milliLiter(s) IV Push once  DOBUTamine Infusion 2.5 MICROgram(s)/kG/Min IV Continuous <Continuous>  heparin  Injectable 5000 Unit(s) SubCutaneous every 8 hours  influenza   Vaccine 0.5 milliLiter(s) IntraMuscular once  insulin regular  human recombinant. 10 Unit(s) IV Push once  insulin regular  human recombinant. 10 Unit(s) IV Push once  insulin regular Infusion 2 Unit(s)/Hr IV Continuous <Continuous>  methylPREDNISolone sodium succinate Injectable 60 milliGRAM(s) IV Push two times a day  norepinephrine Infusion 1.5 MICROgram(s)/kG/Min IV Continuous <Continuous>  propofol Infusion 5 MICROgram(s)/kG/Min IV Continuous <Continuous>  QUEtiapine 50 milliGRAM(s) Oral daily  sodium polystyrene sulfonate Suspension 15 Gram(s) Oral once    PRN MEDICATIONS  acetaminophen   Tablet .. 650 milliGRAM(s) Oral every 6 hours PRN  dextrose 40% Gel 15 Gram(s) Oral once PRN  glucagon  Injectable 1 milliGRAM(s) IntraMuscular once PRN    VITALS:   T(F): 99  HR: 105  BP: 147/76  RR: 35  SpO2: 100%    LABS:                        11.7   21.45 )-----------( 251      ( 10 Apr 2020 04:30 )             35.4     04-10    148<H>  |  111<H>  |  101<HH>  ----------------------------<  273<H>  7.0<HH>   |  22  |  2.9<H>    Ca    9.6      10 Apr 2020 04:30  Mg     3.3     04-10    TPro  5.5<L>  /  Alb  2.5<L>  /  TBili  0.5  /  DBili  x   /  AST  123<H>  /  ALT  236<H>  /  AlkPhos  73  04-10    ABG - ( 10 Apr 2020 08:27 )  pH, Arterial: 7.31  pH, Blood: x     /  pCO2: 49    /  pO2: 298   / HCO3: 25    / Base Excess: -1.9  /  SaO2: 100       CARDIAC MARKERS ( 09 Apr 2020 05:48 )  x     / x     / 60 U/L / x     / x        RADIOLOGY:  < from: Xray Chest 1 View- PORTABLE-Routine (04.10.20 @ 05:53) >    1. Support apparatus as above.  2. Bilateral opacities, slightly improved on the left.    PHYSICAL EXAM:  GEN: intubated, sedated    Patino Catheter  I&O's Detail    09 Apr 2020 07:01  -  10 Apr 2020 07:00  --------------------------------------------------------  IN:    Osmolite: 240 mL  Total IN: 240 mL    OUT:    Indwelling Catheter - Urethral: 800 mL  Total OUT: 800 mL    Total NET: -560 mL      10 Apr 2020 07:01  -  10 Apr 2020 10:35  --------------------------------------------------------  IN:    Osmolite: 240 mL  Total IN: 240 mL    OUT:  Total OUT: 0 mL  Total NET: 240 mL SUBJECTIVE:    Patient is a 59y old Male who presents with a chief complaint of ARF (09 Apr 2020 10:59)    Today is hospital day 10d. intubated, sedated    INTERVAL EVENTS: coded x2,HR ryanne to 20s,PEA, ROSC was achieved after 4 rounds of CPR and 3 epinephrines. ET tube w thick secretions, ?mucus plug?, extubated, bagged and re-intubated, s/p atropine x2, levophed and dobutamine. Pt went into V fib and coded again, amio was given, ROSC achieved after 2 rounds of compressions and 2 epis. L Femoral line placed.    PAST MEDICAL & SURGICAL HISTORY  No significant past surgical history    ALLERGIES:  No Known Allergies    MEDICATIONS:  STANDING MEDICATIONS  chlorhexidine 0.12% Liquid 15 milliLiter(s) Oral Mucosa every 12 hours  chlorhexidine 4% Liquid 1 Application(s) Topical <User Schedule>  dexMEDEtomidine Infusion 0.2 MICROgram(s)/kG/Hr IV Continuous <Continuous>  dextrose 10%. 250 milliLiter(s) IV Continuous <Continuous>  dextrose 10%. 250 milliLiter(s) IV Continuous <Continuous>  dextrose 5% 1000 milliLiter(s) IV Continuous <Continuous>  dextrose 5%. 1000 milliLiter(s) IV Continuous <Continuous>  dextrose 50% Injectable 12.5 Gram(s) IV Push once  dextrose 50% Injectable 25 Gram(s) IV Push once  dextrose 50% Injectable 25 Gram(s) IV Push once  dextrose 50% Injectable 50 milliLiter(s) IV Push once  DOBUTamine Infusion 2.5 MICROgram(s)/kG/Min IV Continuous <Continuous>  heparin  Injectable 5000 Unit(s) SubCutaneous every 8 hours  influenza   Vaccine 0.5 milliLiter(s) IntraMuscular once  insulin regular  human recombinant. 10 Unit(s) IV Push once  insulin regular  human recombinant. 10 Unit(s) IV Push once  insulin regular Infusion 2 Unit(s)/Hr IV Continuous <Continuous>  methylPREDNISolone sodium succinate Injectable 60 milliGRAM(s) IV Push two times a day  norepinephrine Infusion 1.5 MICROgram(s)/kG/Min IV Continuous <Continuous>  propofol Infusion 5 MICROgram(s)/kG/Min IV Continuous <Continuous>  QUEtiapine 50 milliGRAM(s) Oral daily  sodium polystyrene sulfonate Suspension 15 Gram(s) Oral once    PRN MEDICATIONS  acetaminophen   Tablet .. 650 milliGRAM(s) Oral every 6 hours PRN  dextrose 40% Gel 15 Gram(s) Oral once PRN  glucagon  Injectable 1 milliGRAM(s) IntraMuscular once PRN    VITALS:   T(F): 99  HR: 105  BP: 147/76  RR: 35  SpO2: 100%    LABS:                        11.7   21.45 )-----------( 251      ( 10 Apr 2020 04:30 )             35.4     04-10    148<H>  |  111<H>  |  101<HH>  ----------------------------<  273<H>  7.0<HH>   |  22  |  2.9<H>    Ca    9.6      10 Apr 2020 04:30  Mg     3.3     04-10    TPro  5.5<L>  /  Alb  2.5<L>  /  TBili  0.5  /  DBili  x   /  AST  123<H>  /  ALT  236<H>  /  AlkPhos  73  04-10    ABG - ( 10 Apr 2020 08:27 )  pH, Arterial: 7.31  pH, Blood: x     /  pCO2: 49    /  pO2: 298   / HCO3: 25    / Base Excess: -1.9  /  SaO2: 100       CARDIAC MARKERS ( 09 Apr 2020 05:48 )  x     / x     / 60 U/L / x     / x        RADIOLOGY:  < from: Xray Chest 1 View- PORTABLE-Routine (04.10.20 @ 05:53) >    1. Support apparatus as above.  2. Bilateral opacities, slightly improved on the left.    PHYSICAL EXAM:  GEN: intubated, sedated    Patino Catheter  I&O's Detail    09 Apr 2020 07:01  -  10 Apr 2020 07:00  --------------------------------------------------------  IN:    Osmolite: 240 mL  Total IN: 240 mL    OUT:    Indwelling Catheter - Urethral: 800 mL  Total OUT: 800 mL    Total NET: -560 mL      10 Apr 2020 07:01  -  10 Apr 2020 10:35  --------------------------------------------------------  IN:    Osmolite: 240 mL  Total IN: 240 mL    OUT:  Total OUT: 0 mL  Total NET: 240 mL

## 2020-04-10 NOTE — CHART NOTE - NSCHARTNOTEFT_GEN_A_CORE
-medical record to will re-attempt to fax forms to pt's job today  211PM -medical record will re-fax forms to pt's job today  211PM -medical record will re-fax forms to pt's jobs today  211PM -medical record confirmed successful submission of hospitalization forms to both fax #s.

## 2020-04-10 NOTE — CONSULT NOTE ADULT - ASSESSMENT
60 y/o M with TAMKIA, hyperK, acidosis in hospital for cough, SOB, n/v/d, generalized weakness, body aches. COVID POSITIVE. pt coded x 2 yesterday.  no significant PMH    # TAMIKA / hyperK / acidosis: baseline cr. ~ 1.0   - serum creatinine was initially stable around 2.6, increased to 3.0 overnight now returned to 2.5 on repeat labs.   - non-oliguric   - give gentle hydration with d5% 100 meq bicarb at 50 cc/hr   - hyperK noted, can be due to CPR plus TAMIKA. s/p insulin plus dextrose and kayexalate x 2. repeat labs noted for K ~ 5.3. renal diet. monitor levels closely. lokelma ordered, no need at the moment. follow repeat levels.   - ABG noted for non-AG metabolic acidosis plus respiratory acidosis. adjust vent setting   - repeat CK noted. no significant increase.   - check UA, urine creatinine, Na, ID/Cr ratio   - trend creatinine.   - BP noted. keep MAP > 65   - corrected Ca ~ 7.8, Mg noted. check phos.   - Strict I/O   - avoid nephrotoxins and hypotension   - no need for RRT at the moment.    COVID POSITIVE/ CXR noted for slightly improved opacities. on steroids.  prognosis guarded    will follow

## 2020-04-11 NOTE — PROGRESS NOTE ADULT - SUBJECTIVE AND OBJECTIVE BOX
24 h events noted  intubated/ventilated         PAST HISTORY  --------------------------------------------------------------------------------  No significant changes to PMH, PSH, FHx, SHx, unless otherwise noted    ALLERGIES & MEDICATIONS  --------------------------------------------------------------------------------  Allergies    No Known Allergies    Intolerances      Standing Inpatient Medications  chlorhexidine 0.12% Liquid 15 milliLiter(s) Oral Mucosa every 12 hours  chlorhexidine 4% Liquid 1 Application(s) Topical <User Schedule>  dextrose 10%. 250 milliLiter(s) IV Continuous <Continuous>  dextrose 10%. 250 milliLiter(s) IV Continuous <Continuous>  dextrose 10%. 250 milliLiter(s) IV Continuous <Continuous>  dextrose 5% 1000 milliLiter(s) IV Continuous <Continuous>  dextrose 5%. 1000 milliLiter(s) IV Continuous <Continuous>  dextrose 50% Injectable 12.5 Gram(s) IV Push once  dextrose 50% Injectable 25 Gram(s) IV Push once  dextrose 50% Injectable 25 Gram(s) IV Push once  dextrose 50% Injectable 50 milliLiter(s) IV Push once  dextrose 50% Injectable 50 milliLiter(s) IV Push once  heparin  Injectable 5000 Unit(s) SubCutaneous every 8 hours  influenza   Vaccine 0.5 milliLiter(s) IntraMuscular once  insulin regular Infusion 2 Unit(s)/Hr IV Continuous <Continuous>  methylPREDNISolone sodium succinate Injectable 60 milliGRAM(s) IV Push two times a day  norepinephrine Infusion 1.5 MICROgram(s)/kG/Min IV Continuous <Continuous>  propofol Infusion 5 MICROgram(s)/kG/Min IV Continuous <Continuous>  QUEtiapine 50 milliGRAM(s) Oral daily    PRN Inpatient Medications  acetaminophen   Tablet .. 650 milliGRAM(s) Oral every 6 hours PRN  dextrose 40% Gel 15 Gram(s) Oral once PRN  glucagon  Injectable 1 milliGRAM(s) IntraMuscular once PRN        VITALS/PHYSICAL EXAM  --------------------------------------------------------------------------------  T(C): 37.4 (04-11-20 @ 04:38), Max: 37.4 (04-11-20 @ 04:38)  HR: 92 (04-11-20 @ 04:38) (87 - 120)  BP: --  RR: 30 (04-11-20 @ 04:38) (30 - 35)  SpO2: 99% (04-11-20 @ 04:38) (98% - 100%)  Wt(kg): --        04-10-20 @ 07:01  -  04-11-20 @ 07:00  --------------------------------------------------------  IN: 240 mL / OUT: 700 mL / NET: -460 mL      Physical Exam:  	Gen: intubated/ventilated     LABS/STUDIES  --------------------------------------------------------------------------------              10.0   24.29 >-----------<  228      [04-11-20 @ 04:10]              30.8     147  |  107  |  107  ----------------------------<  142      [04-11-20 @ 04:10]  6.2   |  24  |  3.4        Ca     8.5     [04-11-20 @ 04:10]      Mg     3.0     [04-11-20 @ 04:10]    TPro  5.2  /  Alb  2.4  /  TBili  0.5  /  DBili  x   /  AST  62  /  ALT  174  /  AlkPhos  61  [04-11-20 @ 04:10]              [04-11-20 @ 04:10]        [04-11-20 @ 04:10]    Creatinine Trend:  SCr 3.4 [04-11 @ 04:10]  SCr 3.3 [04-11 @ 01:25]  SCr 3.4 [04-10 @ 17:00]  SCr 2.5 [04-10 @ 13:10]  SCr 3.0 [04-10 @ 09:30]    Urinalysis - [04-10-20 @ 16:00]      Color Light Yellow / Appearance Slightly Turbid / SG 1.015 / pH 5.0      Gluc 200 mg/dL / Ketone Negative  / Bili Negative / Urobili <2 mg/dL       Blood Negative / Protein Trace / Leuk Est Small / Nitrite Negative      RBC 14 / WBC 18 / Hyaline 5 / Gran  / Sq Epi  / Non Sq Epi 2 / Bacteria Negative    Urine Creatinine 51      [04-10-20 @ 16:00]  Urine Protein 17      [04-10-20 @ 16:00]  Urine Sodium 44.0      [04-10-20 @ 16:00]    Ferritin 3686      [04-10-20 @ 04:30]  HbA1c 6.2      [04-03-20 @ 08:25]  TSH 0.04      [04-03-20 @ 08:25]  Lipid: chol --, , HDL --, LDL --      [04-11-20 @ 04:10]

## 2020-04-11 NOTE — PROGRESS NOTE ADULT - ASSESSMENT
60 y/o M with TAMIKA, hyperK, acidosis in hospital for cough, SOB, n/v/d, generalized weakness, body aches. COVID POSITIVE. pt coded x  # creatinine trending up  # non oliguric   # k noted, start lokelma 10g q 12  # change feed to nepro  # if remains hyperkalemia, might need hd  #check ph level  # will follow

## 2020-04-11 NOTE — PROGRESS NOTE ADULT - SUBJECTIVE AND OBJECTIVE BOX
Patient is a 59y old  Male who presents with a chief complaint of ARF (10 Apr 2020 11:22)      Over Night Events:  Patient seen and examined.   still vented on sedation     ROS:  See HPI    PHYSICAL EXAM    ICU Vital Signs Last 24 Hrs  T(C): 37 (2020 08:57), Max: 37.4 (2020 04:38)  T(F): 98.6 (2020 08:57), Max: 99.4 (2020 04:38)  HR: 92 (2020 10:44) (87 - 120)  BP: --  BP(mean): --  ABP: 147/59 (2020 08:57) (98/46 - 147/59)  ABP(mean): 68 (10 Apr 2020 17:00) (62 - 68)  RR: 30 (2020 08:57) (30 - 35)  SpO2: 96% (2020 10:44) (96% - 100%)      General: on sedation   HEENT:   et tube              Lymph Nodes: NO cervical LN   Lungs: Bilateral BS  Cardiovascular: Regular   Abdomen: Soft, Positive BS  Extremities: No clubbing   Skin: warm   Neurological: open eyes   positive gag reflex   Musculoskeletal: move all ext     I&O's Detail    10 Apr 2020 07:01  -  2020 07:00  --------------------------------------------------------  IN:    Osmolite: 240 mL  Total IN: 240 mL    OUT:    Indwelling Catheter - Urethral: 700 mL  Total OUT: 700 mL    Total NET: -460 mL      2020 07:01  -  2020 11:15  --------------------------------------------------------  IN:  Total IN: 0 mL    OUT:    Indwelling Catheter - Urethral: 240 mL  Total OUT: 240 mL    Total NET: -240 mL          LABS:                          10.0   24.29 )-----------( 228      ( 2020 04:10 )             30.8         2020 04:10    147    |  107    |  107    ----------------------------<  142    6.2     |  24     |  3.4      Ca    8.5        2020 04:10  Mg     3.0       2020 04:10    TPro  5.2    /  Alb  2.4    /  TBili  0.5    /  DBili  x      /  AST  62     /  ALT  174    /  AlkPhos  61     2020 04:10  Amylase x     lipase x                                                                                        Urinalysis Basic - ( 10 Apr 2020 16:00 )    Color: Light Yellow / Appearance: Slightly Turbid / S.015 / pH: x  Gluc: x / Ketone: Negative  / Bili: Negative / Urobili: <2 mg/dL   Blood: x / Protein: Trace / Nitrite: Negative   Leuk Esterase: Small / RBC: 14 /HPF / WBC 18 /HPF   Sq Epi: x / Non Sq Epi: 2 /HPF / Bacteria: Negative          CARDIAC MARKERS ( 2020 04:10 )  x     / x     / 398 U/L / x     / x      CARDIAC MARKERS ( 10 Apr 2020 09:30 )  x     / x     / 117 U/L / x     / x                                                            Culture - Bronchial (collected 10 Apr 2020 15:00)  Source: Bronch Wash Bronchoalveolar Lavage  Gram Stain (2020 03:22):    Few polymorphonuclear leukocytes seen per low power field    Moderate Squamous epithelial cells seen per low power field    Moderate Gram positive cocci in pairs seen per oil power field                                                   Mode: IPAP 30  RR (machine): 22  FiO2: 40  PEEP: 11  PC: 30  PIP: 31                                      ABG - ( 2020 04:30 )  pH, Arterial: 7.58  pH, Blood: x     /  pCO2: 29    /  pO2: 115   / HCO3: 27    / Base Excess: 5.3   /  SaO2: 99                  MEDICATIONS  (STANDING):  cefTRIAXone   IVPB 1000 milliGRAM(s) IV Intermittent every 24 hours  chlorhexidine 0.12% Liquid 15 milliLiter(s) Oral Mucosa every 12 hours  chlorhexidine 4% Liquid 1 Application(s) Topical <User Schedule>  dextrose 10%. 250 milliLiter(s) (250 mL/Hr) IV Continuous <Continuous>  dextrose 10%. 250 milliLiter(s) (500 mL/Hr) IV Continuous <Continuous>  dextrose 10%. 250 milliLiter(s) (500 mL/Hr) IV Continuous <Continuous>  dextrose 5% 1000 milliLiter(s) (75 mL/Hr) IV Continuous <Continuous>  dextrose 5%. 1000 milliLiter(s) (50 mL/Hr) IV Continuous <Continuous>  dextrose 50% Injectable 12.5 Gram(s) IV Push once  dextrose 50% Injectable 25 Gram(s) IV Push once  dextrose 50% Injectable 25 Gram(s) IV Push once  dextrose 50% Injectable 50 milliLiter(s) IV Push once  dextrose 50% Injectable 50 milliLiter(s) IV Push once  heparin  Injectable 5000 Unit(s) SubCutaneous every 8 hours  influenza   Vaccine 0.5 milliLiter(s) IntraMuscular once  insulin regular Infusion 2 Unit(s)/Hr (2 mL/Hr) IV Continuous <Continuous>  methylPREDNISolone sodium succinate Injectable 60 milliGRAM(s) IV Push two times a day  norepinephrine Infusion 1.5 MICROgram(s)/kG/Min (106 mL/Hr) IV Continuous <Continuous>  propofol Infusion 5 MICROgram(s)/kG/Min (2.26 mL/Hr) IV Continuous <Continuous>  QUEtiapine 50 milliGRAM(s) Oral daily  sodium zirconium cyclosilicate 10 Gram(s) Oral every 12 hours    MEDICATIONS  (PRN):  acetaminophen   Tablet .. 650 milliGRAM(s) Oral every 6 hours PRN Temp greater or equal to 38C (100.4F)  dextrose 40% Gel 15 Gram(s) Oral once PRN Blood Glucose LESS THAN 70 milliGRAM(s)/deciliter  glucagon  Injectable 1 milliGRAM(s) IntraMuscular once PRN Glucose LESS THAN 70 milligrams/deciliter          Xrays:  TLC:  OG:  ET tube:                                                                                    b/l opacity      ECHO:  CAM ICU:

## 2020-04-11 NOTE — PROGRESS NOTE ADULT - ASSESSMENT
Assessment:    Acute hypoxemic respiratory failure secondary to SARS-COV-2  SARS-COV-2 PNA worsening status  Betsey improving   hyperkalemia   s/p code Blue ??  PLAN:    CNS: hold sedation  to assess mental status   EEG   HEENT:  Oral care    PULMONARY:  HOB @ 45 degrees, trend procalcitonin,    lower rate to 22 and fio2 to 40%    CARDIOVASCULAR:     GI: GI prophylaxis Protonix                                         Feeding: NG feed   FREE WATER     RENAL:  F/u  lytes.on inuslin   Lokelma as per renal   renal consult for hyperkalmia and CKD   check CK level  l   INFECTIOUS DISEASE s/p HCQ    bld cx   on Rocephin   follow  DTA   bld cx     HEMATOLOGICAL:  DVT prophylaxis.    ENDOCRINE:  Follow up FS.  Insulin protocol if needed.    MUSCULOSKELETAL: Bed rest for now    CODE STATUS: FULL CODE    DISPOSITION: Patient require continued monitoring in MICU.  poor prognosis

## 2020-04-11 NOTE — CHART NOTE - NSCHARTNOTEFT_GEN_A_CORE
Spoke with wife Brigette and updated her on patient's status. Answered questions; addressed concerns.

## 2020-04-12 NOTE — PROGRESS NOTE ADULT - SUBJECTIVE AND OBJECTIVE BOX
Patient is a 59y old  Male who presents with a chief complaint of ARF (2020 11:15)      Over Night Events:  Patient seen and examined stillvented   today no gag reflex   off sedation       ROS:  See HPI    PHYSICAL EXAM    ICU Vital Signs Last 24 Hrs  T(C): 36.1 (2020 18:00), Max: 36.8 (2020 14:20)  T(F): 97 (2020 18:00), Max: 98.3 (2020 14:20)  HR: 110 (2020 10:08) (90 - 110)  BP: --  BP(mean): --  ABP: 170/73 (2020 10:08) (118/44 - 216/72)  ABP(mean): --  RR: 30 (2020 10:08) (28 - 30)  SpO2: 100% (2020 10:08) (93% - 100%)      General: open eye   HEENT:       et tube          Lymph Nodes: NO cervical LN   Lungs: Bilateral BS  Cardiovascular: Regular   Abdomen: Soft, Positive BS  Extremities: No clubbing   Skin: warm   Neurological: no agag  Musculoskeletal: move all ext     I&O's Detail    2020 07:01  -  2020 07:00  --------------------------------------------------------  IN:  Total IN: 0 mL    OUT:    Indwelling Catheter - Urethral: 1990 mL  Total OUT: 1990 mL    Total NET: -1990 mL          LABS:                          10.1   19.47 )-----------( 260      ( 2020 06:21 )             30.5         2020 06:21    156    |  109    |  126    ----------------------------<  116    4.8     |  30     |  3.6      Ca    8.3        2020 06:21  Mg     2.9       2020 06:21    TPro  5.6    /  Alb  2.6    /  TBili  0.4    /  DBili  x      /  AST  71     /  ALT  115    /  AlkPhos  64     2020 06:21  Amylase x     lipase x                                                                                        Urinalysis Basic - ( 10 Apr 2020 16:00 )    Color: Light Yellow / Appearance: Slightly Turbid / S.015 / pH: x  Gluc: x / Ketone: Negative  / Bili: Negative / Urobili: <2 mg/dL   Blood: x / Protein: Trace / Nitrite: Negative   Leuk Esterase: Small / RBC: 14 /HPF / WBC 18 /HPF   Sq Epi: x / Non Sq Epi: 2 /HPF / Bacteria: Negative          CARDIAC MARKERS ( 2020 04:10 )  x     / x     / 398 U/L / x     / x                                                            Culture - Bronchial (collected 10 Apr 2020 15:00)  Source: Bronch Wash Bronchoalveolar Lavage  Gram Stain (2020 03:22):    Few polymorphonuclear leukocytes seen per low power field    Moderate Squamous epithelial cells seen per low power field    Moderate Gram positive cocci in pairs seen per oil power field  Preliminary Report (2020 19:18):    Normal Respiratory Quynh present                                                   Mode: AC/ CMV (Assist Control/ Continuous Mandatory Ventilation)  RR (machine): 22  FiO2: 40  PEEP: 11  PS: 19  PC: 30  PIP: 30                                      ABG - ( 2020 04:30 )  pH, Arterial: 7.52  pH, Blood: x     /  pCO2: 40    /  pO2: 151   / HCO3: 33    / Base Excess: 9.6   /  SaO2: 99                  MEDICATIONS  (STANDING):  cefTRIAXone   IVPB 1000 milliGRAM(s) IV Intermittent every 24 hours  chlorhexidine 0.12% Liquid 15 milliLiter(s) Oral Mucosa every 12 hours  chlorhexidine 4% Liquid 1 Application(s) Topical <User Schedule>  dextrose 10%. 250 milliLiter(s) (250 mL/Hr) IV Continuous <Continuous>  dextrose 10%. 250 milliLiter(s) (500 mL/Hr) IV Continuous <Continuous>  dextrose 10%. 250 milliLiter(s) (500 mL/Hr) IV Continuous <Continuous>  dextrose 5%. 1000 milliLiter(s) (50 mL/Hr) IV Continuous <Continuous>  dextrose 50% Injectable 12.5 Gram(s) IV Push once  dextrose 50% Injectable 25 Gram(s) IV Push once  dextrose 50% Injectable 25 Gram(s) IV Push once  dextrose 50% Injectable 50 milliLiter(s) IV Push once  dextrose 50% Injectable 50 milliLiter(s) IV Push once  heparin  Injectable 5000 Unit(s) SubCutaneous every 8 hours  influenza   Vaccine 0.5 milliLiter(s) IntraMuscular once  insulin regular Infusion 2 Unit(s)/Hr (2 mL/Hr) IV Continuous <Continuous>  methylPREDNISolone sodium succinate Injectable 60 milliGRAM(s) IV Push two times a day  morphine  Infusion. 2 mG/Hr (2 mL/Hr) IV Continuous <Continuous>  norepinephrine Infusion 1.5 MICROgram(s)/kG/Min (106 mL/Hr) IV Continuous <Continuous>  propofol Infusion 5 MICROgram(s)/kG/Min (2.26 mL/Hr) IV Continuous <Continuous>  QUEtiapine 50 milliGRAM(s) Oral daily  sodium chloride 0.45%. 1000 milliLiter(s) (75 mL/Hr) IV Continuous <Continuous>  sodium zirconium cyclosilicate 10 Gram(s) Oral every 12 hours    MEDICATIONS  (PRN):  acetaminophen   Tablet .. 650 milliGRAM(s) Oral every 6 hours PRN Temp greater or equal to 38C (100.4F)  dextrose 40% Gel 15 Gram(s) Oral once PRN Blood Glucose LESS THAN 70 milliGRAM(s)/deciliter  glucagon  Injectable 1 milliGRAM(s) IntraMuscular once PRN Glucose LESS THAN 70 milligrams/deciliter          Xrays:  TLC:  OG:  ET tube:                                                                                    b/l opacity    ECHO:  [facility] ICU:

## 2020-04-12 NOTE — PROGRESS NOTE ADULT - SUBJECTIVE AND OBJECTIVE BOX
24 h events noted  intubated/ventilated         PAST HISTORY  --------------------------------------------------------------------------------  No significant changes to PMH, PSH, FHx, SHx, unless otherwise noted    ALLERGIES & MEDICATIONS  --------------------------------------------------------------------------------  Allergies    No Known Allergies    Intolerances      Standing Inpatient Medications  cefTRIAXone   IVPB 1000 milliGRAM(s) IV Intermittent every 24 hours  chlorhexidine 0.12% Liquid 15 milliLiter(s) Oral Mucosa every 12 hours  chlorhexidine 4% Liquid 1 Application(s) Topical <User Schedule>  dextrose 10%. 250 milliLiter(s) IV Continuous <Continuous>  dextrose 10%. 250 milliLiter(s) IV Continuous <Continuous>  dextrose 10%. 250 milliLiter(s) IV Continuous <Continuous>  dextrose 5%. 1000 milliLiter(s) IV Continuous <Continuous>  dextrose 50% Injectable 12.5 Gram(s) IV Push once  dextrose 50% Injectable 25 Gram(s) IV Push once  dextrose 50% Injectable 25 Gram(s) IV Push once  dextrose 50% Injectable 50 milliLiter(s) IV Push once  dextrose 50% Injectable 50 milliLiter(s) IV Push once  heparin  Injectable 5000 Unit(s) SubCutaneous every 8 hours  influenza   Vaccine 0.5 milliLiter(s) IntraMuscular once  insulin regular Infusion 2 Unit(s)/Hr IV Continuous <Continuous>  methylPREDNISolone sodium succinate Injectable 60 milliGRAM(s) IV Push two times a day  morphine  Infusion. 2 mG/Hr IV Continuous <Continuous>  norepinephrine Infusion 1.5 MICROgram(s)/kG/Min IV Continuous <Continuous>  propofol Infusion 5 MICROgram(s)/kG/Min IV Continuous <Continuous>  QUEtiapine 50 milliGRAM(s) Oral daily  sodium chloride 0.45%. 1000 milliLiter(s) IV Continuous <Continuous>  sodium zirconium cyclosilicate 10 Gram(s) Oral every 12 hours    PRN Inpatient Medications  acetaminophen   Tablet .. 650 milliGRAM(s) Oral every 6 hours PRN  dextrose 40% Gel 15 Gram(s) Oral once PRN  glucagon  Injectable 1 milliGRAM(s) IntraMuscular once PRN      VITALS/PHYSICAL EXAM  --------------------------------------------------------------------------------  T(C): 36.1 (04-11-20 @ 18:00), Max: 37 (04-11-20 @ 08:57)  HR: 97 (04-11-20 @ 21:15) (88 - 105)  BP: --  RR: 30 (04-11-20 @ 18:00) (28 - 30)  SpO2: 98% (04-11-20 @ 21:15) (93% - 99%)  Wt(kg): --        04-10-20 @ 07:01  -  04-11-20 @ 07:00  --------------------------------------------------------  IN: 240 mL / OUT: 700 mL / NET: -460 mL    04-11-20 @ 07:01  -  04-12-20 @ 05:28  --------------------------------------------------------  IN: 0 mL / OUT: 890 mL / NET: -890 mL      Physical Exam:  	Gen:intubated/ventilated    LABS/STUDIES  --------------------------------------------------------------------------------              10.6   22.40 >-----------<  240      [04-11-20 @ 22:18]              31.8     150  |  105  |  120  ----------------------------<  180      [04-11-20 @ 22:18]  4.8   |  30  |  3.7        Ca     8.4     [04-11-20 @ 22:18]      Mg     3.0     [04-11-20 @ 04:10]    TPro  5.7  /  Alb  2.6  /  TBili  0.4  /  DBili  x   /  AST  69  /  ALT  129  /  AlkPhos  70  [04-11-20 @ 22:18]              [04-11-20 @ 04:10]        [04-11-20 @ 04:10]    Creatinine Trend:  SCr 3.7 [04-11 @ 22:18]  SCr 3.4 [04-11 @ 04:10]  SCr 3.3 [04-11 @ 01:25]  SCr 3.4 [04-10 @ 17:00]  SCr 2.5 [04-10 @ 13:10]    Urinalysis - [04-10-20 @ 16:00]      Color Light Yellow / Appearance Slightly Turbid / SG 1.015 / pH 5.0      Gluc 200 mg/dL / Ketone Negative  / Bili Negative / Urobili <2 mg/dL       Blood Negative / Protein Trace / Leuk Est Small / Nitrite Negative      RBC 14 / WBC 18 / Hyaline 5 / Gran  / Sq Epi  / Non Sq Epi 2 / Bacteria Negative    Urine Creatinine 51      [04-10-20 @ 16:00]  Urine Protein 17      [04-10-20 @ 16:00]  Urine Sodium 44.0      [04-10-20 @ 16:00]    Ferritin 3686      [04-10-20 @ 04:30]  HbA1c 6.2      [04-03-20 @ 08:25]  TSH 0.04      [04-03-20 @ 08:25]  Lipid: chol --, , HDL --, LDL --      [04-11-20 @ 04:10]

## 2020-04-12 NOTE — PROGRESS NOTE ADULT - ASSESSMENT
Assessment:    Acute hypoxemic respiratory failure secondary to SARS-COV-2  SARS-COV-2 PNA worsening status  Betsey improving   hyperkalemia   s/p code Blue ??  anoxic encephalopathy   PLAN:    CNS: hold sedation  CT scan   EEG   neurology consult   HEENT:  Oral care    PULMONARY:  HOB @ 45 degrees, trend procalcitonin,   keep same vent setting     CARDIOVASCULAR:     GI: GI prophylaxis Protonix                                         Feeding: NG feed   FREE WATER     RENAL:  F/u  lytes.on inuslin   Lokelma as per renal   renal consult for hyperkalmia and CKD   check CK level  l   INFECTIOUS DISEASE s/p HCQ    bld cx   on Rocephin   follow  DTA   bld cx     HEMATOLOGICAL:  DVT prophylaxis.    ENDOCRINE:  Follow up FS.  Insulin protocol if needed.    MUSCULOSKELETAL: Bed rest for now    CODE STATUS: FULL CODE    DISPOSITION: Patient require continued monitoring in MICU.  guarded  prognosis

## 2020-04-12 NOTE — PROGRESS NOTE ADULT - ASSESSMENT
TAMIKA/ hyperkalemia/ hypernatremia/ acute respiratory failure/ COVID posyive   # creatinine trending up, check repeat today   # non oliguric   # BUN noted : highly catabolic , and steroids  # k improving  continue lokelma 10g q 12  # change feed to nepro  # hypernatremia, start d5 at 60 cc/h , free water 400 q 8 with feed   #check ph level  # no acute indication for RRT   # will follow

## 2020-04-12 NOTE — PROGRESS NOTE ADULT - SUBJECTIVE AND OBJECTIVE BOX
[facility] Day:12d  Last 24hr Events & Subjective:  No overnight events. No longer has a gag reflex and no pupillary light response.     Past Medical Hx:  No pertinent past medical history    Past Surgical Hx:  No significant past surgical history    Allergies:  No Known Allergies    Current Meds:  Standing Meds  cefTRIAXone   IVPB 1000 milliGRAM(s) IV Intermittent every 24 hours  chlorhexidine 0.12% Liquid 15 milliLiter(s) Oral Mucosa every 12 hours  chlorhexidine 4% Liquid 1 Application(s) Topical <User Schedule>  dextrose 10%. 250 milliLiter(s) IV Continuous <Continuous>  dextrose 10%. 250 milliLiter(s) IV Continuous <Continuous>  dextrose 10%. 250 milliLiter(s) IV Continuous <Continuous>  dextrose 5%. 1000 milliLiter(s) IV Continuous <Continuous>  dextrose 50% Injectable 12.5 Gram(s) IV Push once  dextrose 50% Injectable 25 Gram(s) IV Push once  dextrose 50% Injectable 25 Gram(s) IV Push once  dextrose 50% Injectable 50 milliLiter(s) IV Push once  dextrose 50% Injectable 50 milliLiter(s) IV Push once  heparin  Injectable 5000 Unit(s) SubCutaneous every 8 hours  influenza   Vaccine 0.5 milliLiter(s) IntraMuscular once  insulin regular Infusion 2 Unit(s)/Hr IV Continuous <Continuous>  methylPREDNISolone sodium succinate Injectable 60 milliGRAM(s) IV Push two times a day  morphine  Infusion. 2 mG/Hr IV Continuous <Continuous>  norepinephrine Infusion 1.5 MICROgram(s)/kG/Min IV Continuous <Continuous>  propofol Infusion 5 MICROgram(s)/kG/Min IV Continuous <Continuous>  QUEtiapine 50 milliGRAM(s) Oral daily  sodium chloride 0.45%. 1000 milliLiter(s) IV Continuous <Continuous>  sodium zirconium cyclosilicate 10 Gram(s) Oral every 12 hours    PRN Meds  acetaminophen   Tablet .. 650 milliGRAM(s) Oral every 6 hours PRN  dextrose 40% Gel 15 Gram(s) Oral once PRN  glucagon  Injectable 1 milliGRAM(s) IntraMuscular once PRN    Vital Signs  T(F): 97 (20 @ 18:00), Max: 98.3 (20 @ 14:20)  HR: 110 (20 @ 10:08) (90 - 110)  BP: --  BP(mean): --  ABP: 170/73 (20 @ 10:08) (118/44 - 216/72)  ABP(mean): --  RR: 30 (20 @ 10:08) (28 - 30)  SpO2: 100% (20 @ 10:08) (93% - 100%)  Fluid Balance    20 @ 07:01  -  20 @ 07:00  --------------------------------------------------------  IN:  Total IN: 0 mL    OUT:    Indwelling Catheter - Urethral: 1990 mL  Total OUT: 1990 mL    Total NET: - mL          Physical Exam:  GENERAL: Intubated, not on sedation, no reflexes  HEENT: NCAT, Pupils fixed  CHEST/LUNG: breath sounds bilaterally  HEART: Regular rate and rhythm; s1 s2 appreciated, No murmurs, rubs, or gallops  ABDOMEN: Soft, Nontender, Nondistended; Bowel sounds present  EXTREMITIES: Anasarca  SKIN: no rashes, no new lesions  NERVOUS SYSTEM:  Intubated, not sedated  LINES/CATHETERS: TLC, ET, A-line    Labs:                              10.1<L>  19.47<H>   )-----------(   260      ( 2020 06:21 )                   30.5<L>    Neutro% 83.2<H>, Lympho% 5.5<L>, Mono% 6.6  , Bands 4.2<H>  2020 06:21    156    |  109    |  126    ----------------------------<  116    4.8     |  30     |  3.6      Ca    8.3        2020 06:21  Mg     2.9       2020 06:21    TPro  5.6    /  Alb  2.6    /  TBili  0.4    /  DBili  x      /  AST  71     /  ALT  115    /  AlkPhos  64     2020 06:21    Chol --, LDL --, HDL --, VLDL --, / 20 @ 04:10  Chol --, LDL --, HDL --, VLDL --, / 04-10-20 @ 09:30      Troponin --, CKMB --, / 20 @ 04:10  Troponin --, CKMB --, / 04-10-20 @ 09:30  Troponin --, CKMB --, CK 60/ 20 @ 05:48      Iron --, TIBC --, %Sat --, Ferritin 3686/ 04-10-20 @ 04:30    Hemoglobin A1C, Whole Blood: 6.2 % (20 @ 08:25)      Urinalysis Basic - ( 10 Apr 2020 16:00 )    Color: Light Yellow / Appearance: Slightly Turbid / S.015 / pH: x  Gluc: x / Ketone: Negative  / Bili: Negative / Urobili: <2 mg/dL   Blood: x / Protein: Trace / Nitrite: Negative   Leuk Esterase: Small / RBC: 14 /HPF / WBC 18 /HPF   Sq Epi: x / Non Sq Epi: 2 /HPF / Bacteria: Negative    Imaging & EKG:  CXR Stable opacities from . Support devices in place    Assessment & Plan:  This is a 59 year old male with no PMHx who presented with complaints of shortness of breath and cough for one week    #Acute Hypoxemic Respiratory Failure secondary to viral pneumonia as a result of COVID-19 with Cytokine Storm   - COVID-19 Positive  - s/p CODE BLUE x2 on ; currently with no reflexes. CT head and EEG today  - Intubated off sedation   - Vent Settings  Mode: AC/ CMV (Assist Control/ Continuous Mandatory Ventilation)  RR (machine): 22  FiO2: 40  PEEP: 11  PS: 19  PC: 30  PIP: 30  - Completed Hydroxychloroquine, Anakinra, Azithromycin,   - Cultures negative    #Suspected Anoxic Brain injury secondary to Code Blue  - EEG and CT head today  - Neurology consult placed    #Hypernatremia (worsening)  - Continue on Free water    #Hyperkalemia (resolved)    #TAMIKA  - Continues to have good urine output  - Nephrology following; Avoid nephrotoxins    Activity: Bedrest  Diet: NPO with tube feeds  DVT ppx: Heparin  GI ppx: Protonix  Code Status: Full Code  DISPO: Acute; neuro workup to assess if brain dead.

## 2020-04-12 NOTE — CHART NOTE - NSCHARTNOTEFT_GEN_A_CORE
Spoke to son Austen Zhang at [phone]. Updated on patient's status, discussed poor prognosis, made aware of ct findings and pending EEG findings. He would like to be called back when the eeg is resulted.

## 2020-04-13 NOTE — PROGRESS NOTE ADULT - SUBJECTIVE AND OBJECTIVE BOX
Hospital Day:  13    Subjective:    Patient is a 59y old Male who presents with a chief complaint of ARF (2020 10:46)  Patient is intubated and seems comfortable. No response to any stimuli off pressors. Nurse reports no overnight events.    Past Medical Hx:   No pertinent past medical history    Past Sx:  No significant past surgical history    Allergies:  No Known Allergies    Current Meds:   Standng Meds:  chlorhexidine 0.12% Liquid 15 milliLiter(s) Oral Mucosa every 12 hours  chlorhexidine 4% Liquid 1 Application(s) Topical <User Schedule>  dextrose 10%. 250 milliLiter(s) (250 mL/Hr) IV Continuous <Continuous>  dextrose 10%. 250 milliLiter(s) (500 mL/Hr) IV Continuous <Continuous>  dextrose 10%. 250 milliLiter(s) (500 mL/Hr) IV Continuous <Continuous>  dextrose 5%. 1000 milliLiter(s) (100 mL/Hr) IV Continuous <Continuous>  dextrose 5%. 1000 milliLiter(s) (50 mL/Hr) IV Continuous <Continuous>  dextrose 50% Injectable 12.5 Gram(s) IV Push once  dextrose 50% Injectable 25 Gram(s) IV Push once  dextrose 50% Injectable 25 Gram(s) IV Push once  dextrose 50% Injectable 50 milliLiter(s) IV Push once  dextrose 50% Injectable 50 milliLiter(s) IV Push once  heparin  Injectable 5000 Unit(s) SubCutaneous every 8 hours  influenza   Vaccine 0.5 milliLiter(s) IntraMuscular once  insulin regular Infusion 2 Unit(s)/Hr (2 mL/Hr) IV Continuous <Continuous>  methylPREDNISolone sodium succinate Injectable 60 milliGRAM(s) IV Push two times a day  norepinephrine Infusion 1.5 MICROgram(s)/kG/Min (106 mL/Hr) IV Continuous <Continuous>  QUEtiapine 50 milliGRAM(s) Oral daily  sodium chloride 0.45%. 1000 milliLiter(s) (75 mL/Hr) IV Continuous <Continuous>  sodium zirconium cyclosilicate 10 Gram(s) Oral every 12 hours    PRN Meds:  acetaminophen   Tablet .. 650 milliGRAM(s) Oral every 6 hours PRN Temp greater or equal to 38C (100.4F)  dextrose 40% Gel 15 Gram(s) Oral once PRN Blood Glucose LESS THAN 70 milliGRAM(s)/deciliter  glucagon  Injectable 1 milliGRAM(s) IntraMuscular once PRN Glucose LESS THAN 70 milligrams/deciliter    HOME MEDICATIONS:  Vitamin D3 50,000 intl units (1250 mcg) oral capsule: 1 cap(s) orally once a week      Vital Signs:   T(F): 99.1 (20 @ 07:49), Max: 101.9 (20 @ 16:08)  HR: 94 (20 @ 09:41) (94 - 114)  BP: 127/59 (20 @ 07:00) (127/59 - 143/65)  RR: 22 (20 @ 21:40) (22 - 30)  SpO2: 97% (20 @ 09:41) (97% - 100%)      20 @ 07:01  -  20 @ 07:00  --------------------------------------------------------  IN: 720 mL / OUT: 3955 mL / NET: -3235 mL        Physical Exam:   GENERAL: NAD  HEENT: NCAT  CHEST/LUNG: breath sounds bilaterally  HEART: Regular rate and rhythm; s1 s2 appreciated, No murmurs, rubs, or gallops  ABDOMEN: Soft, Nontender, Nondistended; Bowel sounds present  EXTREMITIES: No LE edema b/l  SKIN: no rashes, no new lesions      Labs:                         8.8    17.74 )-----------( 274      ( 2020 04:30 )             27.8     Neutophil% 81.2, Lymphocyte% 6.7, Monocyte% 8.9, Bands% 3.1 20 @ 04:30    2020 04:30    155    |  111    |  151    ----------------------------<  169    5.7     |  27     |  3.8      Ca    8.3        2020 04:30  Mg     2.9       2020 04:30    TPro  5.3    /  Alb  2.4    /  TBili  0.4    /  DBili  x      /  AST  153    /  ALT  158    /  AlkPhos  71     2020 04:30      Chol --, LDL --, HDL --, VLDL --,  20 @ 04:30      Hemoglobin A1C, Whole Blood: 6.2 % (20 @ 08:25)      Troponin --, CKMB --,  20 @ 04:30  Troponin --, CKMB --,  20 @ 04:10  Troponin --, CKMB --,  04-10-20 @ 09:30        Urinalysis Basic - ( 10 Apr 2020 16:00 )    Color: Light Yellow / Appearance: Slightly Turbid / S.015 / pH: x  Gluc: x / Ketone: Negative  / Bili: Negative / Urobili: <2 mg/dL   Blood: x / Protein: Trace / Nitrite: Negative   Leuk Esterase: Small / RBC: 14 /HPF / WBC 18 /HPF   Sq Epi: x / Non Sq Epi: 2 /HPF / Bacteria: Negative            Assessment and Plan:     This is a 59 year old male with no PMHx who presented with complaints of shortness of breath and cough for one week    #Acute Hypoxemic Respiratory Failure secondary to viral pneumonia as a result of COVID-19 with Cytokine Storm   - COVID-19 Positive  - Intubated off sedation   - Vent Settings  Mode: AC/ CMV (Assist Control/ Continuous Mandatory Ventilation)  RR (machine): 22  FiO2: 40  PEEP: 8  PS: 19  PC: 30  PIP: 30  - Completed Hydroxychloroquine, Anakinra, Azithromycin,   - Cultures negative    #Suspected Anoxic Brain injury secondary to Code Blue  -CTH : c/w hypoxic ischemic encephalopathy  -EEG: pending results  -[facility] recs pending EEG results    #Hypernatremia (worsening)  - Check urine osmol and Na  - Continue on Free water  - Started D5 100/hour  - Will monitor    #Hyperkalemia  - Insulin, D10  - Will monitor    #TAMIKA  - Continues to have good urine output  - Nephrology recs appreciated; Avoid nephrotoxins    Activity: Bedrest  Diet: NPO with tube feeds  DVT ppx: Heparin  GI ppx: Protonix  Code Status: Full Code  DISPO: Acute; neuro workup to assess if brain dead.

## 2020-04-13 NOTE — PROGRESS NOTE ADULT - ASSESSMENT
Assessment:    Acute hypoxemic respiratory failure secondary to SARS-COV-2  SARS-COV-2 PNA worsening status  Betsey improving   hyperkalemia   s/p code Blue ??  anoxic encephalopathy   PLAN:    CNS  EEG   neurology follow up   HEENT:  Oral care    PULMONARY:  HOB @ 45 degrees, trend procalcitonin,   keep same vent setting     CARDIOVASCULAR:     GI: GI prophylaxis Protonix                                         Feeding: NG feed   FREE WATER     RENAL:  F/u  lytes.on inuslin   Lokelma as per renal   renal follow t for hyperkalmia and CKD   check CK level  l   INFECTIOUS DISEASE s/p HCQ    bld cx   on Rocephin   follow  DTA   bld cx     HEMATOLOGICAL:  DVT prophylaxis.    ENDOCRINE:  Follow up FS.  Insulin protocol if needed.    MUSCULOSKELETAL: Bed rest for now    CODE STATUS: FULL CODE    DISPOSITION: Patient require continued monitoring in MICU.  guarded  prognosis

## 2020-04-13 NOTE — CONSULT NOTE ADULT - SUBJECTIVE AND OBJECTIVE BOX
CC: No brain activity , been off sedation    HPI:  59M from home, lives with family, with no pertinent PMHx or PSHx presented to ED with complaints of one week of a cough and shortness of breath with acute hypoxemic respiratory failure secondary to viral pneumonia as a result of COVID 19. Was a code blue x 2 on 4/9 during his hospital course. Neurology consult called for no brain activity even after being off sedation for >24 hrs. CTH revealed findings c/w hypoxic ischemic encephalopathy.      Past Medical Hx:  No pertinent past medical history    Past Surgical Hx:  No significant past surgical history    Allergies:  No Known Allergies      Home Medications:  Vitamin D3 50,000 intl units (1250 mcg) oral capsule: 1 cap(s) orally once a week       Social history  Unable to obtain info      Family history  Unable to obtain info    Neuro Exam:  Orientation: Intubated not on deation, not following commands  Cranial Nerves: Pupils 4mm ou not reactive to light                         Eyes midline                         No corneal reflex, no gag reflex, no dolls   Motor: No spontaneous extremity mvmts  Sensory exam: No response to pain  B/l mute plantar response    NIHSS:     Allergies    No Known Allergies    Intolerances      MEDICATIONS  (STANDING):  cefTRIAXone   IVPB 1000 milliGRAM(s) IV Intermittent every 24 hours  chlorhexidine 0.12% Liquid 15 milliLiter(s) Oral Mucosa every 12 hours  chlorhexidine 4% Liquid 1 Application(s) Topical <User Schedule>  dextrose 10%. 250 milliLiter(s) (250 mL/Hr) IV Continuous <Continuous>  dextrose 10%. 250 milliLiter(s) (500 mL/Hr) IV Continuous <Continuous>  dextrose 10%. 250 milliLiter(s) (500 mL/Hr) IV Continuous <Continuous>  dextrose 5%. 1000 milliLiter(s) (50 mL/Hr) IV Continuous <Continuous>  dextrose 50% Injectable 12.5 Gram(s) IV Push once  dextrose 50% Injectable 25 Gram(s) IV Push once  dextrose 50% Injectable 25 Gram(s) IV Push once  dextrose 50% Injectable 50 milliLiter(s) IV Push once  dextrose 50% Injectable 50 milliLiter(s) IV Push once  heparin  Injectable 5000 Unit(s) SubCutaneous every 8 hours  influenza   Vaccine 0.5 milliLiter(s) IntraMuscular once  insulin regular Infusion 2 Unit(s)/Hr (2 mL/Hr) IV Continuous <Continuous>  methylPREDNISolone sodium succinate Injectable 60 milliGRAM(s) IV Push two times a day  morphine  Infusion. 2 mG/Hr (2 mL/Hr) IV Continuous <Continuous>  norepinephrine Infusion 1.5 MICROgram(s)/kG/Min (106 mL/Hr) IV Continuous <Continuous>  propofol Infusion 5 MICROgram(s)/kG/Min (2.26 mL/Hr) IV Continuous <Continuous>  QUEtiapine 50 milliGRAM(s) Oral daily  sodium chloride 0.45%. 1000 milliLiter(s) (75 mL/Hr) IV Continuous <Continuous>  sodium zirconium cyclosilicate 10 Gram(s) Oral every 12 hours    MEDICATIONS  (PRN):  acetaminophen   Tablet .. 650 milliGRAM(s) Oral every 6 hours PRN Temp greater or equal to 38C (100.4F)  dextrose 40% Gel 15 Gram(s) Oral once PRN Blood Glucose LESS THAN 70 milliGRAM(s)/deciliter  glucagon  Injectable 1 milliGRAM(s) IntraMuscular once PRN Glucose LESS THAN 70 milligrams/deciliter      LABS:                        10.1   19.47 )-----------( 260      ( 12 Apr 2020 06:21 )             30.5     04-12    156<H>  |  109  |  126<HH>  ----------------------------<  116<H>  4.8   |  30  |  3.6<H>    Ca    8.3<L>      12 Apr 2020 06:21  Mg     2.9     04-12    TPro  5.6<L>  /  Alb  2.6<L>  /  TBili  0.4  /  DBili  x   /  AST  71<H>  /  ALT  115<H>  /  AlkPhos  64  04-12      Hemoglobin A1C, Whole Blood: 6.2 % (04-03 @ 08:25)        Neuro Imaging:  < from: CT Head No Cont (04.12.20 @ 12:10) >  COMPARISON:    Noncontrast CT scan of the brain dated September 21, 2019.    FINDINGS:    Diffuse diminished attenuation in the cerebellar hemispheres and effacement of the cerebellar folia likely secondary toglobal anoxia. There is mass effect upon the fourth ventricle and quadrigeminal plate cistern. The fourth ventricle is small. The quadrigeminal plate cistern is almost completely effaced. Inferior displacement of the cerebellar tonsils.    The third and lateral ventricles are mildly dilated including the temporal horns consistent with mild hydrocephalus secondary to mass effect from the edema in the cerebellar hemispheres.    There is loss of the gray-white differentiation likely secondary to global anoxia.    The sulci are effaced consistent with diffuse cerebral edema.    There is a 0.7 cm calcified lesion in the left posterior parietal region likely representing a meningioma or dural calcification.    No acute intracranial hemorrhage or depressed skull fracture.    Opacification of scattered mastoid air cells consistent with inflammation or infection.    Mucosal thickening in the sphenoid sinuses.    IMPRESSION:    In comparison with the prior noncontrast CT scan of the brain dated September 21, 2019:    Interval development of findings consistent with hypoxic ischemic encephalopathy.    < end of copied text >    EEG:     Echo:   Carotid Doppler: N/A  Telemetry:

## 2020-04-13 NOTE — CONSULT NOTE ADULT - ASSESSMENT
This is a 59 year old male with no PMHx who presented with complaints of shortness of breath and cough for one week , with acute hypoxemic respiratory failure secondary to viral pneumonia as a result of COVID 19. Was a code blue x 2 on 4/9 during his hospital course. Neurology consult called for no brain activity even after being off sedation for >24 hrs. CTH revealed findings c/w hypoxic ischemic encephalopathy. Brain stem reflexes absent on this exam.    Labs and imaging so far  -COVID19 + on  mechanical ventilation with ARDS protocol  -end organ damage with renal failure and mild transaminitis  -CXR with b/l opacities  -CTH : c/w hypoxic ischemic encephalopathy  -EEG: pending results  -inflammatory markers significantly elevated. d Dimer [id number]  -BCx NG  -WBc 21.4       Diagnosis  Anoxic Brain injury      Plan  - Follow up pending results of EEG   - Continue supportive care  - Poor prognosis  - Neuro attending note will follow

## 2020-04-13 NOTE — PROGRESS NOTE ADULT - SUBJECTIVE AND OBJECTIVE BOX
AMAYA ORTEGA  59y, Male    All available historical data reviewed    OVERNIGHT EVENTS:  Fio2 40%    ROS:  unable to obtain history secondary to patient's mental status and/or sedation   VITALS:  T(F): 99.1, Max: 101.9 (04-12-20 @ 16:08)  HR: 94  BP: 127/59  RR: 22Vital Signs Last 24 Hrs  T(C): 37.3 (13 Apr 2020 07:49), Max: 38.8 (12 Apr 2020 16:08)  T(F): 99.1 (13 Apr 2020 07:49), Max: 101.9 (12 Apr 2020 16:08)  HR: 94 (13 Apr 2020 09:41) (94 - 114)  BP: 127/59 (13 Apr 2020 07:00) (127/59 - 143/65)  BP(mean): 89 (12 Apr 2020 21:40) (89 - 89)  RR: 22 (12 Apr 2020 21:40) (22 - 30)  SpO2: 97% (13 Apr 2020 09:41) (97% - 100%)    TESTS & MEASUREMENTS:                        8.8    17.74 )-----------( 274      ( 13 Apr 2020 04:30 )             27.8     04-13    155<H>  |  111<H>  |  151<HH>  ----------------------------<  169<H>  5.7<H>   |  27  |  3.8<H>    Ca    8.3<L>      13 Apr 2020 04:30  Mg     2.9     04-13    TPro  5.3<L>  /  Alb  2.4<L>  /  TBili  0.4  /  DBili  x   /  AST  153<H>  /  ALT  158<H>  /  AlkPhos  71  04-13    LIVER FUNCTIONS - ( 13 Apr 2020 04:30 )  Alb: 2.4 g/dL / Pro: 5.3 g/dL / ALK PHOS: 71 U/L / ALT: 158 U/L / AST: 153 U/L / GGT: x             Culture - Bronchial (collected 04-10-20 @ 15:00)  Source: Bronch Wash Bronchoalveolar Lavage  Gram Stain (04-11-20 @ 03:22):    Few polymorphonuclear leukocytes seen per low power field    Moderate Squamous epithelial cells seen per low power field    Moderate Gram positive cocci in pairs seen per oil power field  Final Report (04-12-20 @ 17:44):    Normal Respiratory Quynh present            RADIOLOGY & ADDITIONAL TESTS:  Personal review of radiological diagnostics performed  Echo and EKG results noted when applicable.     MEDICATIONS:  acetaminophen   Tablet .. 650 milliGRAM(s) Oral every 6 hours PRN  cefTRIAXone   IVPB 1000 milliGRAM(s) IV Intermittent every 24 hours  chlorhexidine 0.12% Liquid 15 milliLiter(s) Oral Mucosa every 12 hours  chlorhexidine 4% Liquid 1 Application(s) Topical <User Schedule>  dextrose 10%. 250 milliLiter(s) IV Continuous <Continuous>  dextrose 10%. 250 milliLiter(s) IV Continuous <Continuous>  dextrose 10%. 250 milliLiter(s) IV Continuous <Continuous>  dextrose 40% Gel 15 Gram(s) Oral once PRN  dextrose 5%. 1000 milliLiter(s) IV Continuous <Continuous>  dextrose 5%. 1000 milliLiter(s) IV Continuous <Continuous>  dextrose 50% Injectable 12.5 Gram(s) IV Push once  dextrose 50% Injectable 25 Gram(s) IV Push once  dextrose 50% Injectable 25 Gram(s) IV Push once  dextrose 50% Injectable 50 milliLiter(s) IV Push once  dextrose 50% Injectable 50 milliLiter(s) IV Push once  glucagon  Injectable 1 milliGRAM(s) IntraMuscular once PRN  heparin  Injectable 5000 Unit(s) SubCutaneous every 8 hours  influenza   Vaccine 0.5 milliLiter(s) IntraMuscular once  insulin regular Infusion 2 Unit(s)/Hr IV Continuous <Continuous>  methylPREDNISolone sodium succinate Injectable 60 milliGRAM(s) IV Push two times a day  norepinephrine Infusion 1.5 MICROgram(s)/kG/Min IV Continuous <Continuous>  QUEtiapine 50 milliGRAM(s) Oral daily  sodium chloride 0.45%. 1000 milliLiter(s) IV Continuous <Continuous>  sodium zirconium cyclosilicate 10 Gram(s) Oral every 12 hours      ANTIBIOTICS:  cefTRIAXone   IVPB 1000 milliGRAM(s) IV Intermittent every 24 hours

## 2020-04-13 NOTE — CONSULT NOTE ADULT - ATTENDING COMMENTS
Pt seen examined  Agree with above  Pt with Betsey post cardiac arrest, appears prerenal  creat peaked at 3.0 and is coming down to 2.5  Met Acidosis, hypernatremia - Agree with D5W with 2 amp Na bicarb 50 cc/hr  strict Is and Os  COVID+ on steroids
Patient examined this am.  He is on no sedation, intubated, no response to voice, sternal rub, pain over supraorbital notch, pain in nailbeds all 4 extremities and proximal UE's.  No cough/gag to suctioning.  Absent corneals.  Pupils midposition and fixed.  Oculocephalic response absent.  Cold water caloric response negative.  No posturing or withdrawal to painful stimuli.  Does not appear to be overbreathing the ventilator.  Bedside exam demonstrates brainstem areflexia.  For brain death determination recommend apnea test and if that cannot be accomplished then can do nuclear medicine study to access for absence of CNS blood flow.

## 2020-04-13 NOTE — PROGRESS NOTE ADULT - SUBJECTIVE AND OBJECTIVE BOX
24 h events noted  intubated/ventilated      PAST HISTORY  --------------------------------------------------------------------------------  No significant changes to PMH, PSH, FHx, SHx, unless otherwise noted    ALLERGIES & MEDICATIONS  --------------------------------------------------------------------------------  Allergies    No Known Allergies    Intolerances      Standing Inpatient Medications  cefTRIAXone   IVPB 1000 milliGRAM(s) IV Intermittent every 24 hours  chlorhexidine 0.12% Liquid 15 milliLiter(s) Oral Mucosa every 12 hours  chlorhexidine 4% Liquid 1 Application(s) Topical <User Schedule>  dextrose 10%. 250 milliLiter(s) IV Continuous <Continuous>  dextrose 10%. 250 milliLiter(s) IV Continuous <Continuous>  dextrose 10%. 250 milliLiter(s) IV Continuous <Continuous>  dextrose 5%. 1000 milliLiter(s) IV Continuous <Continuous>  dextrose 5%. 1000 milliLiter(s) IV Continuous <Continuous>  dextrose 50% Injectable 12.5 Gram(s) IV Push once  dextrose 50% Injectable 25 Gram(s) IV Push once  dextrose 50% Injectable 25 Gram(s) IV Push once  dextrose 50% Injectable 50 milliLiter(s) IV Push once  dextrose 50% Injectable 50 milliLiter(s) IV Push once  heparin  Injectable 5000 Unit(s) SubCutaneous every 8 hours  influenza   Vaccine 0.5 milliLiter(s) IntraMuscular once  insulin regular Infusion 2 Unit(s)/Hr IV Continuous <Continuous>  methylPREDNISolone sodium succinate Injectable 60 milliGRAM(s) IV Push two times a day  norepinephrine Infusion 1.5 MICROgram(s)/kG/Min IV Continuous <Continuous>  QUEtiapine 50 milliGRAM(s) Oral daily  sodium chloride 0.45%. 1000 milliLiter(s) IV Continuous <Continuous>  sodium zirconium cyclosilicate 10 Gram(s) Oral every 12 hours    PRN Inpatient Medications  acetaminophen   Tablet .. 650 milliGRAM(s) Oral every 6 hours PRN  dextrose 40% Gel 15 Gram(s) Oral once PRN  glucagon  Injectable 1 milliGRAM(s) IntraMuscular once PRN        VITALS/PHYSICAL EXAM  --------------------------------------------------------------------------------  T(C): 37.3 (04-13-20 @ 07:49), Max: 38.8 (04-12-20 @ 16:08)  HR: 97 (04-13-20 @ 07:00) (97 - 114)  BP: 127/59 (04-13-20 @ 07:00) (127/59 - 143/65)  RR: 22 (04-12-20 @ 21:40) (22 - 30)  SpO2: 98% (04-13-20 @ 07:00) (97% - 100%)  Wt(kg): --        04-12-20 @ 07:01  -  04-13-20 @ 07:00  --------------------------------------------------------  IN: 720 mL / OUT: 3955 mL / NET: -3235 mL      Physical Exam:  	Gen:intubated/ventilated     LABS/STUDIES  --------------------------------------------------------------------------------              8.8    17.74 >-----------<  274      [04-13-20 @ 04:30]              27.8     155  |  111  |  151  ----------------------------<  169      [04-13-20 @ 04:30]  5.7   |  27  |  3.8        Ca     8.3     [04-13-20 @ 04:30]      Mg     2.9     [04-13-20 @ 04:30]    TPro  5.3  /  Alb  2.4  /  TBili  0.4  /  DBili  x   /  AST  153  /  ALT  158  /  AlkPhos  71  [04-13-20 @ 04:30]            [04-13-20 @ 04:30]  LDH 1097      [04-13-20 @ 04:30]    Creatinine Trend:  SCr 3.8 [04-13 @ 04:30]  SCr 3.6 [04-12 @ 06:21]  SCr 3.7 [04-11 @ 22:18]  SCr 3.4 [04-11 @ 04:10]  SCr 3.3 [04-11 @ 01:25]    Urinalysis - [04-10-20 @ 16:00]      Color Light Yellow / Appearance Slightly Turbid / SG 1.015 / pH 5.0      Gluc 200 mg/dL / Ketone Negative  / Bili Negative / Urobili <2 mg/dL       Blood Negative / Protein Trace / Leuk Est Small / Nitrite Negative      RBC 14 / WBC 18 / Hyaline 5 / Gran  / Sq Epi  / Non Sq Epi 2 / Bacteria Negative    Urine Creatinine 51      [04-10-20 @ 16:00]  Urine Protein 17      [04-10-20 @ 16:00]  Urine Sodium 44.0      [04-10-20 @ 16:00]    Ferritin 3686      [04-10-20 @ 04:30]  HbA1c 6.2      [04-03-20 @ 08:25]  TSH 0.04      [04-03-20 @ 08:25]  Lipid: chol --, , HDL --, LDL --      [04-13-20 @ 04:30]

## 2020-04-13 NOTE — CHART NOTE - NSCHARTNOTEFT_GEN_A_CORE
Spoke to wife  Updated on patient's status, discussed poor prognosis  Answered her questions and concerns  Family would like a call back with EEG results.

## 2020-04-13 NOTE — PROGRESS NOTE ADULT - ASSESSMENT
· Assessment		  59M from home, lives with family, with no pertinent PMHx or PSHx presents to the ED with complaints of one week of a cough and shortness of breath.    IMPRESSION;   COVID19 with resolving septic shock, off  pressors, mechanical ventilation with ARDS with FiO2 40%, secondary to Cytokine Release Syndrome  -end organ damage with renal failure and mild transaminitis  -CXR with b/l opacities   -inflammatory markers significantly elevated. d Dimer [id number]  -elevated Ddimer and Ferritin are poor prognostic indicators and differentiate survivors from non survivors  procal of 1.5 suggestive of a bacterial coinfection  BCx NG  WBc 21.4 >17.7  nares ORSA NG  bronch 4/10 NG    RECOMMENDATIONS;  -Rocephin 2 gm iv q24h   s/p PLAQUENIL  prognosis is very poor

## 2020-04-13 NOTE — CONSULT NOTE ADULT - ASSESSMENT
Consult Summary  59 year old man presented from home with cough and shortness of breath, found to be COVID19+. Hospital course complicated by respiratory failure requiring intubation, cardiac arrest with concern for anoxic brain injury, TAMIKA, hypernatremia and hyperkalemia. Palliative care consulted for GOC.    At this time, patient is currently getting workup for anoxic brain injury. Recent CT brain shows findings consistent with hypoxic ischemic encephalopathy.  Patient awaiting read of EEG and family awaiting results from EEG as well. Patient remains vented and full code. Palliative care will reach out to family with primary team once full results are obtained.    Morphine Equivalent Daily Dose (MEDD): NA    Recommendations:  -ongoing medical management and workup per primary team and neurology  -full code  -palliative care will be available for GOC discussions as appropriate      Please Call [phone] PRN

## 2020-04-13 NOTE — PROGRESS NOTE ADULT - ASSESSMENT
TAMIKA/ hyperkalemia/ hypernatremia/ acute respiratory failure/ COVID posyive   # creatinine trending up  # non oliguric   # BUN noted : highly catabolic , and steroids, prerenal   # k improving  continue lokelma 10g q 12  # change feed to nepro  # hypernatremia,  iv fluids d5 1/2 ns at 100 cc/f , free water with feed , check u osm   #check ph level  # neurology notes appreciated  # no acute indication for RRT   # will follow    # overall prognosis poor

## 2020-04-13 NOTE — PROGRESS NOTE ADULT - SUBJECTIVE AND OBJECTIVE BOX
Patient is a 59y old  Male who presents with a chief complaint of COVID ( +) (13 Apr 2020 01:03)      Over Night Events:  Patient seen and examined on morphine vented       ROS:  See HPI    PHYSICAL EXAM    ICU Vital Signs Last 24 Hrs  T(C): 37.3 (13 Apr 2020 07:49), Max: 38.8 (12 Apr 2020 16:08)  T(F): 99.1 (13 Apr 2020 07:49), Max: 101.9 (12 Apr 2020 16:08)  HR: 94 (13 Apr 2020 09:41) (94 - 114)  BP: 127/59 (13 Apr 2020 07:00) (127/59 - 143/65)  BP(mean): 89 (12 Apr 2020 21:40) (89 - 89)  ABP: --  ABP(mean): --  RR: 22 (12 Apr 2020 21:40) (22 - 30)  SpO2: 97% (13 Apr 2020 09:41) (97% - 100%)      General: not awake   HEENT:   et tube               Lymph Nodes: NO cervical LN   Lungs: Bilateral BS  Cardiovascular: Regular   Abdomen: Soft, Positive BS  Extremities: No clubbing   Skin: warm   Neurological: no gag   Musculoskeletal: move all ext     I&O's Detail    12 Apr 2020 07:01  -  13 Apr 2020 07:00  --------------------------------------------------------  IN:    Free Water: 600 mL    Osmolite: 120 mL  Total IN: 720 mL    OUT:    Indwelling Catheter - Urethral: 3955 mL  Total OUT: 3955 mL    Total NET: -3235 mL          LABS:                          8.8    17.74 )-----------( 274      ( 13 Apr 2020 04:30 )             27.8         13 Apr 2020 04:30    155    |  111    |  151    ----------------------------<  169    5.7     |  27     |  3.8      Ca    8.3        13 Apr 2020 04:30  Mg     2.9       13 Apr 2020 04:30    TPro  5.3    /  Alb  2.4    /  TBili  0.4    /  DBili  x      /  AST  153    /  ALT  158    /  AlkPhos  71     13 Apr 2020 04:30  Amylase x     lipase x                                                                                              CARDIAC MARKERS ( 13 Apr 2020 04:30 )  x     / x     / 685 U/L / x     / x                                                            Culture - Bronchial (collected 10 Apr 2020 15:00)  Source: Bronch Wash Bronchoalveolar Lavage  Gram Stain (11 Apr 2020 03:22):    Few polymorphonuclear leukocytes seen per low power field    Moderate Squamous epithelial cells seen per low power field    Moderate Gram positive cocci in pairs seen per oil power field  Final Report (12 Apr 2020 17:44):    Normal Respiratory Quynh present                                                   Mode: AC/ CMV (Assist Control/ Continuous Mandatory Ventilation)  RR (machine): 22  FiO2: 40  PEEP: 8  ITime: 1  PC: 20  PIP: 20                                      ABG - ( 13 Apr 2020 04:30 )  pH, Arterial: 7.42  pH, Blood: x     /  pCO2: 50    /  pO2: 117   / HCO3: 32    / Base Excess: 7.4   /  SaO2: 99                  MEDICATIONS  (STANDING):  cefTRIAXone   IVPB 1000 milliGRAM(s) IV Intermittent every 24 hours  chlorhexidine 0.12% Liquid 15 milliLiter(s) Oral Mucosa every 12 hours  chlorhexidine 4% Liquid 1 Application(s) Topical <User Schedule>  dextrose 10%. 250 milliLiter(s) (250 mL/Hr) IV Continuous <Continuous>  dextrose 10%. 250 milliLiter(s) (500 mL/Hr) IV Continuous <Continuous>  dextrose 10%. 250 milliLiter(s) (500 mL/Hr) IV Continuous <Continuous>  dextrose 5%. 1000 milliLiter(s) (100 mL/Hr) IV Continuous <Continuous>  dextrose 5%. 1000 milliLiter(s) (50 mL/Hr) IV Continuous <Continuous>  dextrose 50% Injectable 12.5 Gram(s) IV Push once  dextrose 50% Injectable 25 Gram(s) IV Push once  dextrose 50% Injectable 25 Gram(s) IV Push once  dextrose 50% Injectable 50 milliLiter(s) IV Push once  dextrose 50% Injectable 50 milliLiter(s) IV Push once  heparin  Injectable 5000 Unit(s) SubCutaneous every 8 hours  influenza   Vaccine 0.5 milliLiter(s) IntraMuscular once  insulin regular Infusion 2 Unit(s)/Hr (2 mL/Hr) IV Continuous <Continuous>  methylPREDNISolone sodium succinate Injectable 60 milliGRAM(s) IV Push two times a day  norepinephrine Infusion 1.5 MICROgram(s)/kG/Min (106 mL/Hr) IV Continuous <Continuous>  QUEtiapine 50 milliGRAM(s) Oral daily  sodium chloride 0.45%. 1000 milliLiter(s) (75 mL/Hr) IV Continuous <Continuous>  sodium zirconium cyclosilicate 10 Gram(s) Oral every 12 hours    MEDICATIONS  (PRN):  acetaminophen   Tablet .. 650 milliGRAM(s) Oral every 6 hours PRN Temp greater or equal to 38C (100.4F)  dextrose 40% Gel 15 Gram(s) Oral once PRN Blood Glucose LESS THAN 70 milliGRAM(s)/deciliter  glucagon  Injectable 1 milliGRAM(s) IntraMuscular once PRN Glucose LESS THAN 70 milligrams/deciliter          Xrays:  TLC:  OG:  ET tube:                                                                                    b/l oapcity    ECHO:  CAM ICU:

## 2020-04-13 NOTE — CONSULT NOTE ADULT - SUBJECTIVE AND OBJECTIVE BOX
REQUESTED OF: DR DAUGHERTY    Chart reviewed, Hospital Day 14    AMAYA ORTEGA 59yMale  HPI:  59M from home, lives with family, with no pertinent PMHx or PSHx presents to the ED with complaints of one week of a cough and shortness of breath. Patient reports some associated nausea, vomiting, diarrhea, generalized weakness, and body aches with his symptoms. Patient denies any recent known sick contact, travel, fever, chills, urinary complaint.    PAST MEDICAL & SURGICAL HISTORY:  No significant past surgical history    Focused Palliative Care Evaluation:                   Symptoms:  patient vented                                     Pain                                     Dyspnea                                     N/V                                     Appetite                                     Anxiety                                     Other _____________________                     Support Devices:              PHYSICAL EXAM:    DEFER TO PRIMARY TEAM        T(C): 36.8, Max: 38.8 (16:08)  HR: 96 (92 - 114)  BP: 124/62 (74/40 - 137/64)  RR: 22 (22 - 30)  SpO2: 98% (97% - 99%)      LABS/STUDIES:  04-13    155<H>  |  111<H>  |  151<HH>  ----------------------------<  169<H>  5.7<H>   |  27  |  3.8<H>    Ca    8.3<L>      13 Apr 2020 04:30  Mg     2.9     04-13    TPro  5.3<L>  /  Alb  2.4<L>  /  TBili  0.4  /  DBili  x   /  AST  153<H>  /  ALT  158<H>  /  AlkPhos  71  04-13                            8.8    17.74 )-----------( 274      ( 13 Apr 2020 04:30 )             27.8       MEDICATIONS  (STANDING):  cefTRIAXone   IVPB 2000 milliGRAM(s) IV Intermittent every 24 hours  chlorhexidine 0.12% Liquid 15 milliLiter(s) Oral Mucosa every 12 hours  chlorhexidine 4% Liquid 1 Application(s) Topical <User Schedule>  dextrose 10%. 250 milliLiter(s) (500 mL/Hr) IV Continuous <Continuous>  dextrose 10%. 250 milliLiter(s) (500 mL/Hr) IV Continuous <Continuous>  dextrose 10%. 250 milliLiter(s) (250 mL/Hr) IV Continuous <Continuous>  dextrose 5%. 1000 milliLiter(s) (100 mL/Hr) IV Continuous <Continuous>  dextrose 5%. 1000 milliLiter(s) (50 mL/Hr) IV Continuous <Continuous>  dextrose 50% Injectable 12.5 Gram(s) IV Push once  dextrose 50% Injectable 25 Gram(s) IV Push once  dextrose 50% Injectable 25 Gram(s) IV Push once  dextrose 50% Injectable 50 milliLiter(s) IV Push once  dextrose 50% Injectable 50 milliLiter(s) IV Push once  heparin  Injectable 5000 Unit(s) SubCutaneous every 8 hours  influenza   Vaccine 0.5 milliLiter(s) IntraMuscular once  insulin regular Infusion 2 Unit(s)/Hr (2 mL/Hr) IV Continuous <Continuous>  methylPREDNISolone sodium succinate Injectable 60 milliGRAM(s) IV Push two times a day  norepinephrine Infusion 1.5 MICROgram(s)/kG/Min (106 mL/Hr) IV Continuous <Continuous>  QUEtiapine 50 milliGRAM(s) Oral daily  sodium chloride 0.45%. 1000 milliLiter(s) (75 mL/Hr) IV Continuous <Continuous>  sodium zirconium cyclosilicate 10 Gram(s) Oral every 12 hours    MEDICATIONS  (PRN):  acetaminophen   Tablet .. 650 milliGRAM(s) Oral every 6 hours PRN Temp greater or equal to 38C (100.4F)  dextrose 40% Gel 15 Gram(s) Oral once PRN Blood Glucose LESS THAN 70 milliGRAM(s)/deciliter  glucagon  Injectable 1 milliGRAM(s) IntraMuscular once PRN Glucose LESS THAN 70 milligrams/deciliter          iStop: #: [id number]        PPS  Level    10%       Note PPS = Palliative Performance Scale; (c)2001, [facility] Society       Range from 100% meaning Full ambulation/self-care/intake/Level of Consicous                                                                              to        10% meaning Bedbound/Unable to do any activity/extensive disease /Total Care/ No PO intake/ LOC=Full/drowsy/+/-confusion        (0% = death)

## 2020-04-14 NOTE — PROGRESS NOTE ADULT - SUBJECTIVE AND OBJECTIVE BOX
Brief HPI  59 year old man presented from home with cough and shortness of breath, found to be COVID19+. Hospital course complicated by respiratory failure requiring intubation, cardiac arrest with concern for anoxic brain injury, TAMIKA, hypernatremia and hyperkalemia. Palliative care consulted for GOC.    Interval History  -Checked in with primary team  -At this time, patient remains intubated awaiting apnea test vs nuclear study for determination of brain death  -Palliative SW called and spoke with patient's wife, Shweta, who wishes for continued full code status pending workup at this time    PHYSICAL EXAM    T(C): , Max: 36.7 (00:00)  T(F): 98  HR: 80 (80 - 102)  BP: 133/61 (133/61 - 161/73)  RR: 22 (22 - 22)  SpO2: 100% (98% - 100%)    DEFER TO PRIMARY TEAM          LABS:                          7.7    16.86 )-----------( 224      ( 14 Apr 2020 05:30 )             23.4                                                                                      04-14    149<H>  |  107  |  161<HH>  ----------------------------<  275<H>  5.1<H>   |  27  |  3.9<H>    Ca    8.5      14 Apr 2020 05:30  Phos  7.3     04-14  Mg     2.5     04-14    TPro  4.9<L>  /  Alb  2.2<L>  /  TBili  0.4  /  DBili  x   /  AST  261<H>  /  ALT  228<H>  /  AlkPhos  60  04-14                                                      MEDICATIONS  (STANDING):  cefTRIAXone   IVPB 2000 milliGRAM(s) IV Intermittent every 24 hours  chlorhexidine 0.12% Liquid 15 milliLiter(s) Oral Mucosa every 12 hours  chlorhexidine 4% Liquid 1 Application(s) Topical <User Schedule>  dextrose 10%. 250 milliLiter(s) (500 mL/Hr) IV Continuous <Continuous>  dextrose 10%. 250 milliLiter(s) (500 mL/Hr) IV Continuous <Continuous>  dextrose 10%. 250 milliLiter(s) (250 mL/Hr) IV Continuous <Continuous>  dextrose 5% + sodium chloride 0.45%. 1000 milliLiter(s) (75 mL/Hr) IV Continuous <Continuous>  dextrose 5%. 1000 milliLiter(s) (50 mL/Hr) IV Continuous <Continuous>  dextrose 50% Injectable 12.5 Gram(s) IV Push once  dextrose 50% Injectable 25 Gram(s) IV Push once  dextrose 50% Injectable 25 Gram(s) IV Push once  dextrose 50% Injectable 50 milliLiter(s) IV Push once  dextrose 50% Injectable 50 milliLiter(s) IV Push once  heparin  Injectable 5000 Unit(s) SubCutaneous every 8 hours  influenza   Vaccine 0.5 milliLiter(s) IntraMuscular once  insulin regular Infusion 2 Unit(s)/Hr (2 mL/Hr) IV Continuous <Continuous>  methylPREDNISolone sodium succinate Injectable 60 milliGRAM(s) IV Push two times a day  norepinephrine Infusion 1.5 MICROgram(s)/kG/Min (106 mL/Hr) IV Continuous <Continuous>  QUEtiapine 50 milliGRAM(s) Oral daily  sodium zirconium cyclosilicate 10 Gram(s) Oral every 12 hours    MEDICATIONS  (PRN):  acetaminophen   Tablet .. 650 milliGRAM(s) Oral every 6 hours PRN Temp greater or equal to 38C (100.4F)  dextrose 40% Gel 15 Gram(s) Oral once PRN Blood Glucose LESS THAN 70 milliGRAM(s)/deciliter  glucagon  Injectable 1 milliGRAM(s) IntraMuscular once PRN Glucose LESS THAN 70 milligrams/deciliter

## 2020-04-14 NOTE — PROGRESS NOTE ADULT - SUBJECTIVE AND OBJECTIVE BOX
*This is a Preliminary Note and will be edited*  SUBJECTIVE:  Patient is a 59y old Male who presents with a chief complaint of ARF (13 Apr 2020 10:46)     Currently admitted to medicine with the primary diagnosis of    Today is hospital day 14d.     PAST MEDICAL & SURGICAL HISTORY  No significant past surgical history    SOCIAL HISTORY:  Negative for smoking/alcohol/drug use.     ALLERGIES:  No Known Allergies    MEDICATIONS:  STANDING MEDICATIONS  acetaminophen   Tablet .. 650 milliGRAM(s) Oral every 6 hours PRN Temp greater or equal to 38C (100.4F)  cefTRIAXone   IVPB 2000 milliGRAM(s) IV Intermittent every 24 hours  chlorhexidine 0.12% Liquid 15 milliLiter(s) Oral Mucosa every 12 hours  chlorhexidine 4% Liquid 1 Application(s) Topical <User Schedule>  dextrose 10%. 250 milliLiter(s) (500 mL/Hr) IV Continuous <Continuous>  dextrose 10%. 250 milliLiter(s) (500 mL/Hr) IV Continuous <Continuous>  dextrose 10%. 250 milliLiter(s) (250 mL/Hr) IV Continuous <Continuous>  dextrose 40% Gel 15 Gram(s) Oral once PRN Blood Glucose LESS THAN 70 milliGRAM(s)/deciliter  dextrose 5%. 1000 milliLiter(s) (100 mL/Hr) IV Continuous <Continuous>  dextrose 5%. 1000 milliLiter(s) (50 mL/Hr) IV Continuous <Continuous>  dextrose 50% Injectable 12.5 Gram(s) IV Push once  dextrose 50% Injectable 25 Gram(s) IV Push once  dextrose 50% Injectable 25 Gram(s) IV Push once  dextrose 50% Injectable 50 milliLiter(s) IV Push once  dextrose 50% Injectable 50 milliLiter(s) IV Push once  glucagon  Injectable 1 milliGRAM(s) IntraMuscular once PRN Glucose LESS THAN 70 milligrams/deciliter  heparin  Injectable 5000 Unit(s) SubCutaneous every 8 hours  influenza   Vaccine 0.5 milliLiter(s) IntraMuscular once  insulin regular Infusion 2 Unit(s)/Hr (2 mL/Hr) IV Continuous <Continuous>  methylPREDNISolone sodium succinate Injectable 60 milliGRAM(s) IV Push two times a day  norepinephrine Infusion 1.5 MICROgram(s)/kG/Min (106 mL/Hr) IV Continuous <Continuous>  QUEtiapine 50 milliGRAM(s) Oral daily  sodium chloride 0.45%. 1000 milliLiter(s) (75 mL/Hr) IV Continuous <Continuous>  sodium zirconium cyclosilicate 10 Gram(s) Oral every 12 hours    PRN MEDICATIONS  acetaminophen   Tablet .. 650 milliGRAM(s) Oral every 6 hours PRN  dextrose 40% Gel 15 Gram(s) Oral once PRN  glucagon  Injectable 1 milliGRAM(s) IntraMuscular once PRN    VITALS:   T(F): 98  HR: 83 (83 - 106)  BP: 150/70 (74/40 - 161/73)  RR: --  SpO2: 100% (97% - 100%)    LABS:                        7.7    16.86 )-----------( 224      ( 14 Apr 2020 05:30 )             23.4     04-13    152<H>  |  107  |  150<HH>  ----------------------------<  300<H>  4.8   |  29  |  4.4<HH>    Ca    8.5      13 Apr 2020 16:35  Mg     2.9     04-13    TPro  5.3<L>  /  Alb  2.4<L>  /  TBili  0.4  /  DBili  x   /  AST  153<H>  /  ALT  158<H>  /  AlkPhos  71  04-13        ABG - ( 14 Apr 2020 04:30 )  pH, Arterial: 7.41  pH, Blood: x     /  pCO2: 45    /  pO2: 149   / HCO3: 29    / Base Excess: 3.6   /  SaO2: 99          CARDIAC MARKERS ( 13 Apr 2020 04:30 )  x     / x     / 685 U/L / x     / x          Serum Pro-Brain Natriuretic Peptide: 1036 pg/mL (04-13-20 @ 11:45)      RADIOLOGY:    PHYSICAL EXAM:  GEN: In no acute distress. Patient is awake in bed able to have a conversation.  LUNGS: CTABL, Symmetrical inspiration, no increased work of breathing  HEART: +S1,S2, regular rate and rhythm, No murmurs, Rubs, Gallops appreciated  ABD: Bowel Sounds Present, Soft, non tender, non distended, no guarding, no rebound.   EXT: 2+ peripheral Pulses, no clubbing, no cyanosis, no Edema.   NEURO: Alert and Oriented X3. No focal deficits Appreciated. Cranial Nerves 2-12 Grossly intact. SUBJECTIVE:  Patient is a 59y old Male who presents with a chief complaint of ARF (13 Apr 2020 10:46)     Currently admitted to medicine with the primary diagnosis of    Today is hospital day 14d.     PAST MEDICAL & SURGICAL HISTORY  No significant past surgical history    SOCIAL HISTORY:  Negative for smoking/alcohol/drug use.     ALLERGIES:  No Known Allergies    MEDICATIONS:  STANDING MEDICATIONS  acetaminophen   Tablet .. 650 milliGRAM(s) Oral every 6 hours PRN Temp greater or equal to 38C (100.4F)  cefTRIAXone   IVPB 2000 milliGRAM(s) IV Intermittent every 24 hours  chlorhexidine 0.12% Liquid 15 milliLiter(s) Oral Mucosa every 12 hours  chlorhexidine 4% Liquid 1 Application(s) Topical <User Schedule>  dextrose 10%. 250 milliLiter(s) (500 mL/Hr) IV Continuous <Continuous>  dextrose 10%. 250 milliLiter(s) (500 mL/Hr) IV Continuous <Continuous>  dextrose 10%. 250 milliLiter(s) (250 mL/Hr) IV Continuous <Continuous>  dextrose 40% Gel 15 Gram(s) Oral once PRN Blood Glucose LESS THAN 70 milliGRAM(s)/deciliter  dextrose 5%. 1000 milliLiter(s) (100 mL/Hr) IV Continuous <Continuous>  dextrose 5%. 1000 milliLiter(s) (50 mL/Hr) IV Continuous <Continuous>  dextrose 50% Injectable 12.5 Gram(s) IV Push once  dextrose 50% Injectable 25 Gram(s) IV Push once  dextrose 50% Injectable 25 Gram(s) IV Push once  dextrose 50% Injectable 50 milliLiter(s) IV Push once  dextrose 50% Injectable 50 milliLiter(s) IV Push once  glucagon  Injectable 1 milliGRAM(s) IntraMuscular once PRN Glucose LESS THAN 70 milligrams/deciliter  heparin  Injectable 5000 Unit(s) SubCutaneous every 8 hours  influenza   Vaccine 0.5 milliLiter(s) IntraMuscular once  insulin regular Infusion 2 Unit(s)/Hr (2 mL/Hr) IV Continuous <Continuous>  methylPREDNISolone sodium succinate Injectable 60 milliGRAM(s) IV Push two times a day  norepinephrine Infusion 1.5 MICROgram(s)/kG/Min (106 mL/Hr) IV Continuous <Continuous>  QUEtiapine 50 milliGRAM(s) Oral daily  sodium chloride 0.45%. 1000 milliLiter(s) (75 mL/Hr) IV Continuous <Continuous>  sodium zirconium cyclosilicate 10 Gram(s) Oral every 12 hours    PRN MEDICATIONS  acetaminophen   Tablet .. 650 milliGRAM(s) Oral every 6 hours PRN  dextrose 40% Gel 15 Gram(s) Oral once PRN  glucagon  Injectable 1 milliGRAM(s) IntraMuscular once PRN    VITALS:   T(F): 98  HR: 83 (83 - 106)  BP: 150/70 (74/40 - 161/73)  RR: --  SpO2: 100% (97% - 100%)    LABS:                        7.7    16.86 )-----------( 224      ( 14 Apr 2020 05:30 )             23.4     04-13    152<H>  |  107  |  150<HH>  ----------------------------<  300<H>  4.8   |  29  |  4.4<HH>    Ca    8.5      13 Apr 2020 16:35  Mg     2.9     04-13    TPro  5.3<L>  /  Alb  2.4<L>  /  TBili  0.4  /  DBili  x   /  AST  153<H>  /  ALT  158<H>  /  AlkPhos  71  04-13        ABG - ( 14 Apr 2020 04:30 )  pH, Arterial: 7.41  pH, Blood: x     /  pCO2: 45    /  pO2: 149   / HCO3: 29    / Base Excess: 3.6   /  SaO2: 99          CARDIAC MARKERS ( 13 Apr 2020 04:30 )  x     / x     / 685 U/L / x     / x          Serum Pro-Brain Natriuretic Peptide: 1036 pg/mL (04-13-20 @ 11:45)      RADIOLOGY:  < from: Xray Chest 1 View- PORTABLE-Routine (04.14.20 @ 06:43) >  Impression:    Stable bilateral airspace opacities.  < end of copied text >    PHYSICAL EXAM:  GEN: Intubated - No sedation -  LUNGS: CTABL, Symmetrical chest wall movement  HEART: +S1,S2, regular rate and rhythm, No murmurs, Rubs, Gallops appreciated  ABD: Bowel Sounds Present, Soft, non tender, non distended, no guarding, no rebound.   NEURO: No response to Verbal, or Tactile, or painful stimuli. No corneal Reflex, No Pupillary Reflex.

## 2020-04-14 NOTE — PROGRESS NOTE ADULT - ASSESSMENT
Consult Summary  59 year old man presented from home with cough and shortness of breath, found to be COVID19+. Hospital course complicated by respiratory failure requiring intubation, cardiac arrest with concern for anoxic brain injury, TAMIKA, hypernatremia and hyperkalemia. Palliative care consulted for GOC.    Checked in with primary team. At this time, patient remains intubated awaiting apnea test vs nuclear study for determination of brain death. Palliative SW called and spoke with patient's wife, Shweta, who wishes for continued full code status pending workup at this time.    Morphine Equivalent Daily Dose (MEDD): NA    Recommendations:  -ongoing medical management and workup per primary team and neurology; awaiting brain death workup  -full code  -palliative care will be available for GOC discussions as appropriate      Please Call [phone] PRN

## 2020-04-14 NOTE — PROGRESS NOTE ADULT - ASSESSMENT
Assessment:    Acute hypoxemic respiratory failure secondary to SARS-COV-2  SARS-COV-2 PNA worsening status  Betsey improving   hyperkalemia   s/p code Blue ??  anoxic encephalopathy   PLAN:    CNS  EEG   neurology follow up ?? brain death protocol   HEENT:  Oral care    PULMONARY:  HOB @ 45 degrees,  keep same vent setting     CARDIOVASCULAR:     GI: GI prophylaxis Protonix                                         Feeding: NG feed   FREE WATER     RENAL:  F/u  lytes.on inuslin   Lokelma as per renal   check CK level  l   INFECTIOUS DISEASE s/p HCQ    bld cx   on Rocephin   follow  DTA   bld cx     HEMATOLOGICAL:  DVT prophylaxis.    ENDOCRINE:  Follow up FS.  Insulin protocol if needed.    MUSCULOSKELETAL: Bed rest for now    CODE STATUS: FULL CODE    DISPOSITION: Patient require continued monitoring in MICU.  guarded  prognosis  ?? brain death protocol   pallaitive care

## 2020-04-14 NOTE — PROGRESS NOTE ADULT - ASSESSMENT
TAMIKA/ hyperkalemia/ hypernatremia/ acute respiratory failure/ COVID posyive   # creatinine trending up  # non oliguric   # BUN noted : highly catabolic , and steroids, prerenal   # k improving  continue lokelma 10g q 12  # change feed to nepro  # hypernatremia,  iv fluids d5 1/2 ns at 75 cc/h , free water with feed , check u osm   # neurology notes appreciated  # no acute indication for RRT   # will follow    # overall prognosis poor

## 2020-04-14 NOTE — PROGRESS NOTE ADULT - ASSESSMENT
*This is a Preliminary Assessment and plan and will be edited*     This is a 59 year old male with no PMHx who presented with complaints of shortness of breath and cough for one week    #Acute Hypoxemic Respiratory Failure secondary to viral pneumonia as a result of COVID-19 with Cytokine Storm   - COVID-19 Positive  - Intubated off sedation   - Vent Settings  Mode: AC/ CMV (Assist Control/ Continuous Mandatory Ventilation)  RR (machine): 22  FiO2: 40  PEEP: 8  PS: 19  PC: 30  PIP: 30  - Completed Hydroxychloroquine, Anakinra, Azithromycin,   - Cultures negative    #Suspected Anoxic Brain injury secondary to Code Blue  -CTH : c/w hypoxic ischemic encephalopathy  -EEG: pending results  -Idris recs pending EEG results    #Hypernatremia (worsening)  - Check urine osmol and Na  - Continue on Free water  - Started D5 100/hour  - Will monitor    #Hyperkalemia  - Insulin, D10  - Will monitor    #TAMIKA  - Continues to have good urine output  - Nephrology recs appreciated; Avoid nephrotoxins    Activity: Bedrest  Diet: NPO with tube feeds  DVT ppx: Heparin  GI ppx: Protonix  Code Status: Full Code  DISPO: Acute; neuro workup to assess if brain dead. This is a 59 year old male with no PMHx who presented with complaints of shortness of breath and cough for one week    #Acute Hypoxemic Respiratory Failure secondary to viral pneumonia as a result of COVID-19 with Cytokine Storm   - COVID-19 Positive  - Intubated off sedation   - Vent Settings  Mode: AC/ CMV (Assist Control/ Continuous Mandatory Ventilation)  RR (machine): 24  FiO2: 40  PEEP: 8  PS: 19  PC: 30  PIP: 30  - Completed Hydroxychloroquine, Anakinra, Azithromycin,   - Cultures negative    #Suspected Anoxic Brain injury secondary to Code Blue  -CTH : ,consistent with hypoxic ischemic encephalopathy  -EEG: Performed - Pending Results  -Nuero recs pending EEG results - Considering Apnea test vs Nuclear Medicine for Cerebral  Blood flow     #TAMIKA  - Nephrology Consult Appreciated  -Urine osmol 496 April 13  - Started D5 1/2 NS @ 75   - Continues to have good urine output  - Avoid nephrotoxins    # Hypernatremia Improving today 149 (<152)  - Urine osmol 496 April 13  - Continue on Free water with feeds  - Started D5 1/2 NS @ 75   - Will monitor    #Hyperkalemia  - Nepro Feeds  - Continue Lokelma  - Will monitor    Activity: Bedrest  Diet: NPO with tube feeds - nepro  DVT ppx: Heparin  GI ppx: Protonix  Code Status: Full Code  DISPO: Acute; neuro workup to assess if brain dead.

## 2020-04-14 NOTE — PROGRESS NOTE ADULT - SUBJECTIVE AND OBJECTIVE BOX
24 h events noted  intubated/ventilated         PAST HISTORY  --------------------------------------------------------------------------------  No significant changes to PMH, PSH, FHx, SHx, unless otherwise noted    ALLERGIES & MEDICATIONS  --------------------------------------------------------------------------------  Allergies    No Known Allergies    Intolerances      Standing Inpatient Medications  cefTRIAXone   IVPB 2000 milliGRAM(s) IV Intermittent every 24 hours  chlorhexidine 0.12% Liquid 15 milliLiter(s) Oral Mucosa every 12 hours  chlorhexidine 4% Liquid 1 Application(s) Topical <User Schedule>  dextrose 10%. 250 milliLiter(s) IV Continuous <Continuous>  dextrose 10%. 250 milliLiter(s) IV Continuous <Continuous>  dextrose 10%. 250 milliLiter(s) IV Continuous <Continuous>  dextrose 5%. 1000 milliLiter(s) IV Continuous <Continuous>  dextrose 5%. 1000 milliLiter(s) IV Continuous <Continuous>  dextrose 50% Injectable 12.5 Gram(s) IV Push once  dextrose 50% Injectable 25 Gram(s) IV Push once  dextrose 50% Injectable 25 Gram(s) IV Push once  dextrose 50% Injectable 50 milliLiter(s) IV Push once  dextrose 50% Injectable 50 milliLiter(s) IV Push once  heparin  Injectable 5000 Unit(s) SubCutaneous every 8 hours  influenza   Vaccine 0.5 milliLiter(s) IntraMuscular once  insulin regular Infusion 2 Unit(s)/Hr IV Continuous <Continuous>  methylPREDNISolone sodium succinate Injectable 60 milliGRAM(s) IV Push two times a day  norepinephrine Infusion 1.5 MICROgram(s)/kG/Min IV Continuous <Continuous>  QUEtiapine 50 milliGRAM(s) Oral daily  sodium chloride 0.45%. 1000 milliLiter(s) IV Continuous <Continuous>  sodium zirconium cyclosilicate 10 Gram(s) Oral every 12 hours    PRN Inpatient Medications  acetaminophen   Tablet .. 650 milliGRAM(s) Oral every 6 hours PRN  dextrose 40% Gel 15 Gram(s) Oral once PRN  glucagon  Injectable 1 milliGRAM(s) IntraMuscular once PRN        VITALS/PHYSICAL EXAM  --------------------------------------------------------------------------------  T(C): 36.7 (04-14-20 @ 04:00), Max: 36.8 (04-13-20 @ 12:00)  HR: 83 (04-14-20 @ 06:00) (83 - 106)  BP: 150/70 (04-14-20 @ 06:00) (74/40 - 161/73)  RR: --  SpO2: 100% (04-14-20 @ 06:00) (97% - 100%)  Wt(kg): --        04-13-20 @ 07:01  -  04-14-20 @ 07:00  --------------------------------------------------------  IN: 2902 mL / OUT: 3000 mL / NET: -98 mL      Physical Exam:  	Gen: intubated/ventilated     LABS/STUDIES  --------------------------------------------------------------------------------              7.7    16.86 >-----------<  224      [04-14-20 @ 05:30]              23.4     149  |  107  |  161  ----------------------------<  275      [04-14-20 @ 05:30]  5.1   |  27  |  3.9        Ca     8.5     [04-14-20 @ 05:30]      Mg     2.5     [04-14-20 @ 05:30]      Phos  7.3     [04-14-20 @ 05:30]    TPro  4.9  /  Alb  2.2  /  TBili  0.4  /  DBili  x   /  AST  261  /  ALT  228  /  AlkPhos  60  [04-14-20 @ 05:30]              [04-13-20 @ 04:30]        [04-14-20 @ 05:30]    Creatinine Trend:  SCr 3.9 [04-14 @ 05:30]  SCr 4.4 [04-13 @ 16:35]  SCr 3.8 [04-13 @ 04:30]  SCr 3.6 [04-12 @ 06:21]  SCr 3.7 [04-11 @ 22:18]    Urinalysis - [04-10-20 @ 16:00]      Color Light Yellow / Appearance Slightly Turbid / SG 1.015 / pH 5.0      Gluc 200 mg/dL / Ketone Negative  / Bili Negative / Urobili <2 mg/dL       Blood Negative / Protein Trace / Leuk Est Small / Nitrite Negative      RBC 14 / WBC 18 / Hyaline 5 / Gran  / Sq Epi  / Non Sq Epi 2 / Bacteria Negative    Urine Creatinine 51      [04-10-20 @ 16:00]  Urine Protein 17      [04-10-20 @ 16:00]  Urine Sodium 44.0      [04-10-20 @ 16:00]  Urine Osmolality 496      [04-13-20 @ 12:15]    Ferritin 1886      [04-12-20 @ 06:21]  HbA1c 6.2      [04-03-20 @ 08:25]  TSH 0.04      [04-03-20 @ 08:25]  Lipid: chol --, , HDL --, LDL --      [04-13-20 @ 04:30]

## 2020-04-14 NOTE — CHART NOTE - NSCHARTNOTEFT_GEN_A_CORE
Spoke with wife and updated her on patient's status poor prognosis; renal failure and anoxic encephalopathy  Patient was counseled that Palliative Care to discuss goals of care  Answered questions, addressed concerns.  Will continue with daily updates

## 2020-04-14 NOTE — PROGRESS NOTE ADULT - SUBJECTIVE AND OBJECTIVE BOX
Patient is a 59y old  Male who presents with a chief complaint of ARF (13 Apr 2020 10:46)      Over Night Events:  Patient seen and examined not awake no gag off sedation   worsening renal failure       ROS:  See HPI    PHYSICAL EXAM    ICU Vital Signs Last 24 Hrs  T(C): 36.7 (14 Apr 2020 04:00), Max: 36.8 (13 Apr 2020 12:00)  T(F): 98 (14 Apr 2020 04:00), Max: 98.3 (13 Apr 2020 12:00)  HR: 83 (14 Apr 2020 06:00) (83 - 106)  BP: 150/70 (14 Apr 2020 06:00) (74/40 - 161/73)  BP(mean): --  ABP: --  ABP(mean): --  RR: --  SpO2: 100% (14 Apr 2020 06:00) (98% - 100%)      General: not awake   HEENT:  et tube               Lymph Nodes: NO cervical LN   Lungs: Bilateral BS  Cardiovascular: Regular   Abdomen: Soft, Positive BS  Extremities: No clubbing   Skin: warm   Neurological: no gag reflex  Musculoskeletal: move all ext     I&O's Detail    13 Apr 2020 07:01  -  14 Apr 2020 07:00  --------------------------------------------------------  IN:    dextrose 5%.: 1100 mL    Free Water: 1250 mL    insulin regular Infusion: 72 mL    Nepro with Carb Steady: 480 mL  Total IN: 2902 mL    OUT:    Indwelling Catheter - Urethral: 3000 mL  Total OUT: 3000 mL    Total NET: -98 mL      14 Apr 2020 07:01  -  14 Apr 2020 10:45  --------------------------------------------------------  IN:    dextrose 5%.: 100 mL  Total IN: 100 mL    OUT:  Total OUT: 0 mL    Total NET: 100 mL          LABS:                          7.7    16.86 )-----------( 224      ( 14 Apr 2020 05:30 )             23.4         14 Apr 2020 05:30    149    |  107    |  161    ----------------------------<  275    5.1     |  27     |  3.9      Ca    8.5        14 Apr 2020 05:30  Phos  7.3       14 Apr 2020 05:30  Mg     2.5       14 Apr 2020 05:30    TPro  4.9    /  Alb  2.2    /  TBili  0.4    /  DBili  x      /  AST  261    /  ALT  228    /  AlkPhos  60     14 Apr 2020 05:30  Amylase x     lipase x                                                                                              CARDIAC MARKERS ( 13 Apr 2020 04:30 )  x     / x     / 685 U/L / x     / x                                                                                                                                             ABG - ( 14 Apr 2020 04:30 )  pH, Arterial: 7.41  pH, Blood: x     /  pCO2: 45    /  pO2: 149   / HCO3: 29    / Base Excess: 3.6   /  SaO2: 99                  MEDICATIONS  (STANDING):  cefTRIAXone   IVPB 2000 milliGRAM(s) IV Intermittent every 24 hours  chlorhexidine 0.12% Liquid 15 milliLiter(s) Oral Mucosa every 12 hours  chlorhexidine 4% Liquid 1 Application(s) Topical <User Schedule>  dextrose 10%. 250 milliLiter(s) (500 mL/Hr) IV Continuous <Continuous>  dextrose 10%. 250 milliLiter(s) (500 mL/Hr) IV Continuous <Continuous>  dextrose 10%. 250 milliLiter(s) (250 mL/Hr) IV Continuous <Continuous>  dextrose 5% + sodium chloride 0.45%. 1000 milliLiter(s) (75 mL/Hr) IV Continuous <Continuous>  dextrose 5%. 1000 milliLiter(s) (50 mL/Hr) IV Continuous <Continuous>  dextrose 50% Injectable 12.5 Gram(s) IV Push once  dextrose 50% Injectable 25 Gram(s) IV Push once  dextrose 50% Injectable 25 Gram(s) IV Push once  dextrose 50% Injectable 50 milliLiter(s) IV Push once  dextrose 50% Injectable 50 milliLiter(s) IV Push once  heparin  Injectable 5000 Unit(s) SubCutaneous every 8 hours  influenza   Vaccine 0.5 milliLiter(s) IntraMuscular once  insulin regular Infusion 2 Unit(s)/Hr (2 mL/Hr) IV Continuous <Continuous>  methylPREDNISolone sodium succinate Injectable 60 milliGRAM(s) IV Push two times a day  norepinephrine Infusion 1.5 MICROgram(s)/kG/Min (106 mL/Hr) IV Continuous <Continuous>  QUEtiapine 50 milliGRAM(s) Oral daily  sodium zirconium cyclosilicate 10 Gram(s) Oral every 12 hours    MEDICATIONS  (PRN):  acetaminophen   Tablet .. 650 milliGRAM(s) Oral every 6 hours PRN Temp greater or equal to 38C (100.4F)  dextrose 40% Gel 15 Gram(s) Oral once PRN Blood Glucose LESS THAN 70 milliGRAM(s)/deciliter  glucagon  Injectable 1 milliGRAM(s) IntraMuscular once PRN Glucose LESS THAN 70 milligrams/deciliter          Xrays:  TLC:  OG:  ET tube:                                                                                    stable    ECHO:  [facility] ICU:

## 2020-04-14 NOTE — CHART NOTE - NSCHARTNOTEFT_GEN_A_CORE
Registered Dietitian Follow-Up     Patient Profile Reviewed                           Yes [x]   No []     Nutrition History Previously Obtained        Yes []  No [x]  intubated     Pertinent Subjective Information: intubated/sedated, TF changed to Nepro      Pertinent Medical Interventions: Acute Hypoxemic Respiratory Failure secondary to viral pneumonia as a result of COVID-19 with Cytokine Storm. OFf sedation. Suspected Anoxic Brain injury secondary to Code Blue. neuro workup to assess if brain dead. Hypernatremia Improving today. Hyperkalemia - lokelma. Palliative following - Full code     Diet order: Nepro @ 120 ml q6hr (provides 864 kcals, 38 gms protein, 350 ml free water, 500 mg K+, 340 mg Phos)     Anthropometrics:  - Ht. 162.6 cm   - Wt. 75.4 kg (3/31) no new weights   - %wt change  - BMI 28.5   - IBW     Pertinent Lab Data: (4/14) H/H 7.7/23.4  POCT glucose 146  Na 149  K 5.1    Cr 3.9  GFR 16  Mg 2.5  Phos 7.3      Pertinent Meds: heparin, insulin, D5 @ 100ml/hr = 408 kcals, lokelma, insulin, dobutamine, levophed,  solumedrol     Physical Findings:  - Appearance: intubated   - GI function: LBM 4/5, rectal tube   - Tubes: OGT  - Oral/Mouth cavity: NPO  - Skin: intact      Nutrition Requirements  Weight Used: 75.4 kg, slightly adjusted Ve: 11.2  Tmax: 36.8       CALORIES: ~1550 - 1750 kcals/day   ~1696 kcals/day ([id number]) vs. 0159-1728 kcals (20-25 kcal/kg CBW)  PROTEIN: 90 - 113 gms/day (1.2 - 1.5 gm/kg CBW)  FLUID: per LIP     Nutrient Intake: 1272 kcals, 38 gms protein (TF + D5)        [] Previous Nutrition Diagnosis: Inadequate protein energy intake.             [x] Ongoing          [] Resolved    [] No active nutrition diagnosis identified at this time    Nutrition Intervention  EN      Goal/Expected Outcome: Pt to meet % of needs in 3 days      Indicator/Monitoring: RD to monitor energy intake, diet order, body comp, NFPF, renal/glucose/lytes profile     Recommendation: Nepro not indicated for Critical Care setting, Change feeds to Peptamen Intense VHP @ 250ml q6hr (provides 1000kcal, 92 gms protein, 840 ml free water, 1500mg K+, 680mg Phos) <-does not include 408 kcals from D5. Flushes per LIP

## 2020-04-15 NOTE — PROGRESS NOTE ADULT - ASSESSMENT
59M from home, lives with family, with no pertinent PMHx or PSHx presents to the ED with complaints of one week of a cough and shortness of breath.    IMPRESSION;   COVID19 with resolving septic shock, off  pressors, mechanical ventilation with ARDS with FiO2 40%, secondary to Cytokine Release Syndrome  -end organ damage with renal failure and mild transaminitis  -CXR with b/l opacities   -inflammatory markers significantly elevated. d Dimer [id number]  -elevated Ddimer and Ferritin are poor prognostic indicators and differentiate survivors from non survivors  procal of 1.5 suggestive of a bacterial coinfection  BCx NG  WBc 21.4 >17.7  nares ORSA NG  bronch 4/10 NG    RECOMMENDATIONS;  -Rocephin 2 gm iv q24h. Could hold for now   s/p PLAQUENIL  prognosis is very poor

## 2020-04-15 NOTE — CHART NOTE - NSCHARTNOTEFT_GEN_A_CORE
Palliative SW reached out to spouse, Shweta , spouse request another visit, SW explained at this time, we are only allowing 1 visitor at this time to protect visitors and preserve PPE for RN who are taking care of patient  Spouse coping adequately  pt remains FC  Palliative will continue to follow and provide support x [phone]

## 2020-04-15 NOTE — PROGRESS NOTE ADULT - SUBJECTIVE AND OBJECTIVE BOX
AMAYA ORTEGA  59y, Male    All available historical data reviewed    OVERNIGHT EVENTS:    fio2 40%  ROS:  unable to obtain history secondary to patient's mental status and/or sedation     VITALS:  T(F): 95.4, Max: 96.5 (04-15-20 @ 03:00)  HR: 87  BP: 166/74  RR: 22Vital Signs Last 24 Hrs  T(C): 35.2 (15 Apr 2020 12:00), Max: 35.8 (15 Apr 2020 03:00)  T(F): 95.4 (15 Apr 2020 12:00), Max: 96.5 (15 Apr 2020 03:00)  HR: 87 (15 Apr 2020 12:00) (72 - 95)  BP: 166/74 (15 Apr 2020 12:00) (98/51 - 206/84)  BP(mean): 104 (15 Apr 2020 12:00) (72 - 120)  RR: 22 (15 Apr 2020 12:00) (22 - 22)  SpO2: 100% (15 Apr 2020 12:00) (100% - 100%)    TESTS & MEASUREMENTS:                        9.0    21.53 )-----------( 277      ( 15 Apr 2020 05:20 )             27.6     04-15    153<H>  |  110  |  155<HH>  ----------------------------<  220<H>  4.7   |  26  |  3.1<H>    Ca    8.8      15 Apr 2020 05:20  Phos  8.2     04-15  Mg     2.6     04-15    TPro  5.5<L>  /  Alb  2.6<L>  /  TBili  0.3  /  DBili  x   /  AST  185<H>  /  ALT  333<H>  /  AlkPhos  62  04-15    LIVER FUNCTIONS - ( 15 Apr 2020 05:20 )  Alb: 2.6 g/dL / Pro: 5.5 g/dL / ALK PHOS: 62 U/L / ALT: 333 U/L / AST: 185 U/L / GGT: x             Culture - Bronchial (collected 04-10-20 @ 15:00)  Source: Bronch Wash Bronchoalveolar Lavage  Gram Stain (04-11-20 @ 03:22):    Few polymorphonuclear leukocytes seen per low power field    Moderate Squamous epithelial cells seen per low power field    Moderate Gram positive cocci in pairs seen per oil power field  Final Report (04-12-20 @ 17:44):    Normal Respiratory Quynh present            RADIOLOGY & ADDITIONAL TESTS:  Personal review of radiological diagnostics performed  Echo and EKG results noted when applicable.     MEDICATIONS:  acetaminophen   Tablet .. 650 milliGRAM(s) Oral every 6 hours PRN  cefTRIAXone   IVPB 2000 milliGRAM(s) IV Intermittent every 24 hours  chlorhexidine 0.12% Liquid 15 milliLiter(s) Oral Mucosa every 12 hours  chlorhexidine 4% Liquid 1 Application(s) Topical <User Schedule>  dextrose 10%. 250 milliLiter(s) IV Continuous <Continuous>  dextrose 10%. 250 milliLiter(s) IV Continuous <Continuous>  dextrose 10%. 250 milliLiter(s) IV Continuous <Continuous>  dextrose 40% Gel 15 Gram(s) Oral once PRN  dextrose 5% + sodium chloride 0.45%. 1000 milliLiter(s) IV Continuous <Continuous>  dextrose 5%. 1000 milliLiter(s) IV Continuous <Continuous>  dextrose 50% Injectable 12.5 Gram(s) IV Push once  dextrose 50% Injectable 25 Gram(s) IV Push once  dextrose 50% Injectable 25 Gram(s) IV Push once  dextrose 50% Injectable 50 milliLiter(s) IV Push once  dextrose 50% Injectable 50 milliLiter(s) IV Push once  glucagon  Injectable 1 milliGRAM(s) IntraMuscular once PRN  heparin  Injectable 5000 Unit(s) SubCutaneous every 8 hours  influenza   Vaccine 0.5 milliLiter(s) IntraMuscular once  insulin regular Infusion 2 Unit(s)/Hr IV Continuous <Continuous>  methylPREDNISolone sodium succinate Injectable 60 milliGRAM(s) IV Push two times a day  norepinephrine Infusion 1.5 MICROgram(s)/kG/Min IV Continuous <Continuous>  QUEtiapine 50 milliGRAM(s) Oral daily  sodium zirconium cyclosilicate 10 Gram(s) Oral every 12 hours      ANTIBIOTICS:  cefTRIAXone   IVPB 2000 milliGRAM(s) IV Intermittent every 24 hours

## 2020-04-15 NOTE — PROGRESS NOTE ADULT - SUBJECTIVE AND OBJECTIVE BOX
Patient is a 59y old  Male who presents with a chief complaint of PNA (15 Apr 2020 10:29)      Over Night Events:  Patient seen and examined still vented as per neuro  brain dead       ROS:  See HPI    PHYSICAL EXAM    ICU Vital Signs Last 24 Hrs  T(C): 35.8 (15 Apr 2020 07:00), Max: 35.8 (15 Apr 2020 03:00)  T(F): 96.4 (15 Apr 2020 07:00), Max: 96.5 (15 Apr 2020 03:00)  HR: 80 (15 Apr 2020 06:00) (72 - 95)  BP: 102/52 (15 Apr 2020 06:00) (98/51 - 206/84)  BP(mean): 74 (15 Apr 2020 06:00) (72 - 120)  ABP: --  ABP(mean): --  RR: 22 (15 Apr 2020 06:00) (22 - 22)  SpO2: 100% (15 Apr 2020 06:00) (100% - 100%)      General:no gag   HEENT:  et tube               Lymph Nodes: NO cervical LN   Lungs: Bilateral BS  Cardiovascular: Regular   Abdomen: Soft, Positive BS  Extremities: No clubbing   Skin: warm   Neurological: no gag no reflex   Musculoskeletal: move all ext     I&O's Detail    14 Apr 2020 07:01  -  15 Apr 2020 07:00  --------------------------------------------------------  IN:    dextrose 5% + sodium chloride 0.45%.: 1275 mL    dextrose 5%.: 100 mL    Free Water: 1250 mL    insulin regular Infusion: 54 mL    Nepro with Carb Steady: 480 mL  Total IN: 3159 mL    OUT:    Indwelling Catheter - Urethral: 1725 mL  Total OUT: 1725 mL    Total NET: 1434 mL      15 Apr 2020 07:01  -  15 Apr 2020 10:53  --------------------------------------------------------  IN:    dextrose 5% + sodium chloride 0.45%.: 75 mL  Total IN: 75 mL    OUT:  Total OUT: 0 mL    Total NET: 75 mL          LABS:                          9.0    21.53 )-----------( 277      ( 15 Apr 2020 05:20 )             27.6         15 Apr 2020 05:20    153    |  110    |  155    ----------------------------<  220    4.7     |  26     |  3.1      Ca    8.8        15 Apr 2020 05:20  Phos  8.2       15 Apr 2020 05:20  Mg     2.6       15 Apr 2020 05:20    TPro  5.5    /  Alb  2.6    /  TBili  0.3    /  DBili  x      /  AST  185    /  ALT  333    /  AlkPhos  62     15 Apr 2020 05:20  Amylase x     lipase x                                                                                                                                                                                                 Mode: AC/ CMV (Assist Control/ Continuous Mandatory Ventilation)  RR (machine): 22  FiO2: 40  PEEP: 8  ITime: 1  PC: 20  PIP: 20                                      ABG - ( 15 Apr 2020 01:19 )  pH, Arterial: 7.37  pH, Blood: x     /  pCO2: 47    /  pO2: 181   / HCO3: 27    / Base Excess: 1.1   /  SaO2: 99                  MEDICATIONS  (STANDING):  cefTRIAXone   IVPB 2000 milliGRAM(s) IV Intermittent every 24 hours  chlorhexidine 0.12% Liquid 15 milliLiter(s) Oral Mucosa every 12 hours  chlorhexidine 4% Liquid 1 Application(s) Topical <User Schedule>  dextrose 10%. 250 milliLiter(s) (500 mL/Hr) IV Continuous <Continuous>  dextrose 10%. 250 milliLiter(s) (500 mL/Hr) IV Continuous <Continuous>  dextrose 10%. 250 milliLiter(s) (250 mL/Hr) IV Continuous <Continuous>  dextrose 5% + sodium chloride 0.45%. 1000 milliLiter(s) (75 mL/Hr) IV Continuous <Continuous>  dextrose 5%. 1000 milliLiter(s) (50 mL/Hr) IV Continuous <Continuous>  dextrose 50% Injectable 12.5 Gram(s) IV Push once  dextrose 50% Injectable 25 Gram(s) IV Push once  dextrose 50% Injectable 25 Gram(s) IV Push once  dextrose 50% Injectable 50 milliLiter(s) IV Push once  dextrose 50% Injectable 50 milliLiter(s) IV Push once  heparin  Injectable 5000 Unit(s) SubCutaneous every 8 hours  influenza   Vaccine 0.5 milliLiter(s) IntraMuscular once  insulin regular Infusion 2 Unit(s)/Hr (2 mL/Hr) IV Continuous <Continuous>  methylPREDNISolone sodium succinate Injectable 60 milliGRAM(s) IV Push two times a day  norepinephrine Infusion 1.5 MICROgram(s)/kG/Min (106 mL/Hr) IV Continuous <Continuous>  QUEtiapine 50 milliGRAM(s) Oral daily  sodium zirconium cyclosilicate 10 Gram(s) Oral every 12 hours    MEDICATIONS  (PRN):  acetaminophen   Tablet .. 650 milliGRAM(s) Oral every 6 hours PRN Temp greater or equal to 38C (100.4F)  dextrose 40% Gel 15 Gram(s) Oral once PRN Blood Glucose LESS THAN 70 milliGRAM(s)/deciliter  glucagon  Injectable 1 milliGRAM(s) IntraMuscular once PRN Glucose LESS THAN 70 milligrams/deciliter          Xrays:  TLC:  OG:  ET tube:                                                                                    b/l opacity    ECHO:  [facility] ICU:

## 2020-04-15 NOTE — CHART NOTE - NSCHARTNOTEFT_GEN_A_CORE
Brigette, wife, called for routine daily update at [phone] with no answer.    Voice message left that update will be call back will be performed the following day.  D/W IM team who will attempt another call back after apnea test.

## 2020-04-15 NOTE — PROGRESS NOTE ADULT - SUBJECTIVE AND OBJECTIVE BOX
Neurology Progress Note    Interval History:    Called back for brain death exam prior to apnea test.  Prior exam 2 days ago showed absence of brainstem reflexes.  When I stopped by this afternoon core temp was < 36 c.  Patient warmed and now  Core Temp > 36 c.    T(F): 98.1 (04-15-20 @ 15:30), Max: 98.1 (04-15-20 @ 15:30)  HR: 104 (04-15-20 @ 16:00) (72 - 104)  BP: 171/82 (04-15-20 @ 16:00) (98/51 - 206/84)  RR: 22 (04-15-20 @ 16:00) (22 - 22)  SpO2: 100% (04-15-20 @ 16:00) (99% - 100%)    Neurological Exam:  5 p.m.  Mental status: Intubated/sedated on nothing.  Not following commands.  No response to loud voice, sternal rub, pain in supraorbital notch, proximal and distal painful stimuli in each of extremities.    Cranial nerves: Midposition fixed/unreactive to light. No versive gaze.  Absent oculocephalic response.  Corneals absent.  No nystagmus.  No gag or cough to deep suctioning and touching back of throat on both sides.  Motor:  No spontaneous movements.    Sensation: No withdrawal or posturing to pain.      MEDICATIONS  (STANDING):  cefTRIAXone   IVPB 2000 milliGRAM(s) IV Intermittent every 24 hours  chlorhexidine 0.12% Liquid 15 milliLiter(s) Oral Mucosa every 12 hours  chlorhexidine 4% Liquid 1 Application(s) Topical <User Schedule>  dextrose 10%. 250 milliLiter(s) (500 mL/Hr) IV Continuous <Continuous>  dextrose 10%. 250 milliLiter(s) (500 mL/Hr) IV Continuous <Continuous>  dextrose 10%. 250 milliLiter(s) (250 mL/Hr) IV Continuous <Continuous>  dextrose 5% + sodium chloride 0.45%. 1000 milliLiter(s) (75 mL/Hr) IV Continuous <Continuous>  dextrose 5%. 1000 milliLiter(s) (50 mL/Hr) IV Continuous <Continuous>  dextrose 50% Injectable 12.5 Gram(s) IV Push once  dextrose 50% Injectable 25 Gram(s) IV Push once  dextrose 50% Injectable 25 Gram(s) IV Push once  dextrose 50% Injectable 50 milliLiter(s) IV Push once  dextrose 50% Injectable 50 milliLiter(s) IV Push once  heparin  Injectable 5000 Unit(s) SubCutaneous every 8 hours  influenza   Vaccine 0.5 milliLiter(s) IntraMuscular once  insulin regular Infusion 2 Unit(s)/Hr (2 mL/Hr) IV Continuous <Continuous>  methylPREDNISolone sodium succinate Injectable 60 milliGRAM(s) IV Push two times a day  norepinephrine Infusion 1.5 MICROgram(s)/kG/Min (106 mL/Hr) IV Continuous <Continuous>  QUEtiapine 50 milliGRAM(s) Oral daily  sodium zirconium cyclosilicate 10 Gram(s) Oral every 12 hours      Labs:  CBC Full  -  ( 15 Apr 2020 05:20 )  WBC Count : 21.53 K/uL  RBC Count : 2.87 M/uL  Hemoglobin : 9.0 g/dL  Hematocrit : 27.6 %  Platelet Count - Automated : 277 K/uL  Mean Cell Volume : 96.2 fL  Mean Cell Hemoglobin : 31.4 pg  Mean Cell Hemoglobin Concentration : 32.6 g/dL  Auto Neutrophil # : 19.30 K/uL  Auto Lymphocyte # : 0.86 K/uL  Auto Monocyte # : 1.01 K/uL  Auto Eosinophil # : 0.00 K/uL  Auto Basophil # : 0.04 K/uL  Auto Neutrophil % : 89.6 %  Auto Lymphocyte % : 4.0 %  Auto Monocyte % : 4.7 %  Auto Eosinophil % : 0.0 %  Auto Basophil % : 0.2 %    04-15    153<H>  |  110  |  155<HH>  ----------------------------<  220<H>  4.7   |  26  |  3.1<H>    Ca    8.8      15 Apr 2020 05:20  Phos  8.2     04-15  Mg     2.6     04-15    TPro  5.5<L>  /  Alb  2.6<L>  /  TBili  0.3  /  DBili  x   /  AST  185<H>  /  ALT  333<H>  /  AlkPhos  62  04-15    LIVER FUNCTIONS - ( 15 Apr 2020 05:20 )  Alb: 2.6 g/dL / Pro: 5.5 g/dL / ALK PHOS: 62 U/L / ALT: 333 U/L / AST: 185 U/L / GGT: x           < from: CT Head No Cont (04.12.20 @ 12:10) >    In comparison with the prior noncontrast CT scan of the brain dated September 21, 2019:    Interval development of findings consistent with hypoxic ischemic encephalopathy.      < end of copied text >      Assessment:  This is a 59y Male w/ h/o respiratory failure and Covid-19 infection.  On current exam patient continues to have no brainstem reflexes.  The Brain Death checklist which according to Policy #: 700.00 "may be used but is not required" has been completed and was handed to resident at 5:05 p.m.    Plan:  1.  Apnea test for completion of brain death exam.  2.  If #1 cannot be completed then a nuclear medicine cerebral perfusion study may be performed to document absence of cerebral blood flow.

## 2020-04-15 NOTE — PROGRESS NOTE ADULT - SUBJECTIVE AND OBJECTIVE BOX
24h events noted  intubated/ventilated         PAST HISTORY  --------------------------------------------------------------------------------  No significant changes to PMH, PSH, FHx, SHx, unless otherwise noted    ALLERGIES & MEDICATIONS  --------------------------------------------------------------------------------  Allergies    No Known Allergies    Intolerances      Standing Inpatient Medications  cefTRIAXone   IVPB 2000 milliGRAM(s) IV Intermittent every 24 hours  chlorhexidine 0.12% Liquid 15 milliLiter(s) Oral Mucosa every 12 hours  chlorhexidine 4% Liquid 1 Application(s) Topical <User Schedule>  dextrose 10%. 250 milliLiter(s) IV Continuous <Continuous>  dextrose 10%. 250 milliLiter(s) IV Continuous <Continuous>  dextrose 10%. 250 milliLiter(s) IV Continuous <Continuous>  dextrose 5% + sodium chloride 0.45%. 1000 milliLiter(s) IV Continuous <Continuous>  dextrose 5%. 1000 milliLiter(s) IV Continuous <Continuous>  dextrose 50% Injectable 12.5 Gram(s) IV Push once  dextrose 50% Injectable 25 Gram(s) IV Push once  dextrose 50% Injectable 25 Gram(s) IV Push once  dextrose 50% Injectable 50 milliLiter(s) IV Push once  dextrose 50% Injectable 50 milliLiter(s) IV Push once  heparin  Injectable 5000 Unit(s) SubCutaneous every 8 hours  influenza   Vaccine 0.5 milliLiter(s) IntraMuscular once  insulin regular Infusion 2 Unit(s)/Hr IV Continuous <Continuous>  methylPREDNISolone sodium succinate Injectable 60 milliGRAM(s) IV Push two times a day  norepinephrine Infusion 1.5 MICROgram(s)/kG/Min IV Continuous <Continuous>  QUEtiapine 50 milliGRAM(s) Oral daily  sodium zirconium cyclosilicate 10 Gram(s) Oral every 12 hours    PRN Inpatient Medications  acetaminophen   Tablet .. 650 milliGRAM(s) Oral every 6 hours PRN  dextrose 40% Gel 15 Gram(s) Oral once PRN  glucagon  Injectable 1 milliGRAM(s) IntraMuscular once PRN          VITALS/PHYSICAL EXAM  --------------------------------------------------------------------------------  T(C): 35.8 (04-15-20 @ 07:00), Max: 35.8 (04-15-20 @ 03:00)  HR: 80 (04-15-20 @ 06:00) (72 - 95)  BP: 102/52 (04-15-20 @ 06:00) (98/51 - 206/84)  RR: 22 (04-15-20 @ 06:00) (22 - 22)  SpO2: 100% (04-15-20 @ 06:00) (100% - 100%)  Wt(kg): --        04-14-20 @ 07:01  -  04-15-20 @ 07:00  --------------------------------------------------------  IN: 3159 mL / OUT: 1725 mL / NET: 1434 mL    04-15-20 @ 07:01  -  04-15-20 @ 09:59  --------------------------------------------------------  IN: 75 mL / OUT: 0 mL / NET: 75 mL      Physical Exam:  	Gen: intubated/ventilated    LABS/STUDIES  --------------------------------------------------------------------------------              9.0    21.53 >-----------<  277      [04-15-20 @ 05:20]              27.6     153  |  110  |  155  ----------------------------<  220      [04-15-20 @ 05:20]  4.7   |  26  |  3.1        Ca     8.8     [04-15-20 @ 05:20]      Mg     2.6     [04-15-20 @ 05:20]      Phos  8.2     [04-15-20 @ 05:20]    TPro  5.5  /  Alb  2.6  /  TBili  0.3  /  DBili  x   /  AST  185  /  ALT  333  /  AlkPhos  62  [04-15-20 @ 05:20]    LDH 1096      [04-15-20 @ 05:20]    Creatinine Trend:  SCr 3.1 [04-15 @ 05:20]  SCr 3.9 [04-14 @ 05:30]  SCr 4.4 [04-13 @ 16:35]  SCr 3.8 [04-13 @ 04:30]  SCr 3.6 [04-12 @ 06:21]    Urinalysis - [04-10-20 @ 16:00]      Color Light Yellow / Appearance Slightly Turbid / SG 1.015 / pH 5.0      Gluc 200 mg/dL / Ketone Negative  / Bili Negative / Urobili <2 mg/dL       Blood Negative / Protein Trace / Leuk Est Small / Nitrite Negative      RBC 14 / WBC 18 / Hyaline 5 / Gran  / Sq Epi  / Non Sq Epi 2 / Bacteria Negative    Urine Creatinine 51      [04-10-20 @ 16:00]  Urine Protein 17      [04-10-20 @ 16:00]  Urine Sodium 44.0      [04-10-20 @ 16:00]  Urine Osmolality 496      [04-13-20 @ 12:15]    Ferritin 1886      [04-12-20 @ 06:21]  HbA1c 6.2      [04-03-20 @ 08:25]  TSH 0.04      [04-03-20 @ 08:25]  Lipid: chol --, , HDL --, LDL --      [04-13-20 @ 04:30]

## 2020-04-15 NOTE — PROGRESS NOTE ADULT - SUBJECTIVE AND OBJECTIVE BOX
Brief HPI  59 year old man presented from home with cough and shortness of breath, found to be COVID19+. Hospital course complicated by respiratory failure requiring intubation, cardiac arrest with concern for anoxic brain injury, TAMIKA, hypernatremia and hyperkalemia. Palliative care consulted for [facility].    Interval History  -Checked in with primary team  -At this time, patient remains intubated awaiting apnea test to assess for brain death    PHYSICAL EXAM    T(C): , Max: 35.8 (03:00)  T(F): 96.4  HR: 80 (72 - 95)  BP: 102/52 (98/51 - 206/84)  RR: 22 (22 - 22)  SpO2: 100% (100% - 100%)    DEFERRED TO PRIMARY TEAM          LABS:                          9.0    21.53 )-----------( 277      ( 15 Apr 2020 05:20 )             27.6                                                                                      04-15    153<H>  |  110  |  155<HH>  ----------------------------<  220<H>  4.7   |  26  |  3.1<H>    Ca    8.8      15 Apr 2020 05:20  Phos  8.2     04-15  Mg     2.6     04-15    TPro  5.5<L>  /  Alb  2.6<L>  /  TBili  0.3  /  DBili  x   /  AST  185<H>  /  ALT  333<H>  /  AlkPhos  62  04-15                                                      MEDICATIONS  (STANDING):  cefTRIAXone   IVPB 2000 milliGRAM(s) IV Intermittent every 24 hours  chlorhexidine 0.12% Liquid 15 milliLiter(s) Oral Mucosa every 12 hours  chlorhexidine 4% Liquid 1 Application(s) Topical <User Schedule>  dextrose 10%. 250 milliLiter(s) (500 mL/Hr) IV Continuous <Continuous>  dextrose 10%. 250 milliLiter(s) (500 mL/Hr) IV Continuous <Continuous>  dextrose 10%. 250 milliLiter(s) (250 mL/Hr) IV Continuous <Continuous>  dextrose 5% + sodium chloride 0.45%. 1000 milliLiter(s) (75 mL/Hr) IV Continuous <Continuous>  dextrose 5%. 1000 milliLiter(s) (50 mL/Hr) IV Continuous <Continuous>  dextrose 50% Injectable 12.5 Gram(s) IV Push once  dextrose 50% Injectable 25 Gram(s) IV Push once  dextrose 50% Injectable 25 Gram(s) IV Push once  dextrose 50% Injectable 50 milliLiter(s) IV Push once  dextrose 50% Injectable 50 milliLiter(s) IV Push once  heparin  Injectable 5000 Unit(s) SubCutaneous every 8 hours  influenza   Vaccine 0.5 milliLiter(s) IntraMuscular once  insulin regular Infusion 2 Unit(s)/Hr (2 mL/Hr) IV Continuous <Continuous>  methylPREDNISolone sodium succinate Injectable 60 milliGRAM(s) IV Push two times a day  norepinephrine Infusion 1.5 MICROgram(s)/kG/Min (106 mL/Hr) IV Continuous <Continuous>  QUEtiapine 50 milliGRAM(s) Oral daily  sodium zirconium cyclosilicate 10 Gram(s) Oral every 12 hours    MEDICATIONS  (PRN):  acetaminophen   Tablet .. 650 milliGRAM(s) Oral every 6 hours PRN Temp greater or equal to 38C (100.4F)  dextrose 40% Gel 15 Gram(s) Oral once PRN Blood Glucose LESS THAN 70 milliGRAM(s)/deciliter  glucagon  Injectable 1 milliGRAM(s) IntraMuscular once PRN Glucose LESS THAN 70 milligrams/deciliter

## 2020-04-15 NOTE — PROGRESS NOTE ADULT - ASSESSMENT
TAMIKA/ hyperkalemia/ hypernatremia/ acute respiratory failure/ COVID posyive   # creatinine  improving   # non oliguric   # BUN noted : highly catabolic , and steroids, prerenal   # k improving  continue lokelma 10g q 12  # change feed to nepro  # hypernatremia,  iv fluids d5  at 75 cc/h , free water with feed   # neurology notes appreciated  3 followed by palliative care/ brain death protocol  pending   # no acute indication for RRT   # will follow    # overall prognosis poor

## 2020-04-15 NOTE — PROGRESS NOTE ADULT - ASSESSMENT
Assessment:    Acute hypoxemic respiratory failure secondary to SARS-COV-2  SARS-COV-2 PNA worsening status  Betsey improving   hyperkalemia   s/p code Blue ??  anoxic encephalopathy   PLAN:    CNS  recall neuro to fill the brain death paper the neuro part   will do apnea test today     HEENT:  Oral care    PULMONARY:  HOB @ 45 degrees,  keep same vent setting     CARDIOVASCULAR:     GI: GI prophylaxis Protonix                                         Feeding: NG feed   FREE WATER     RENAL:  F/u  lytes.on inuslin   Lokelma as per renal   check CK level  l   INFECTIOUS DISEASE s/p HCQ    bld cx   on Rocephin   follow  DTA   bld cx     HEMATOLOGICAL:  DVT prophylaxis.    ENDOCRINE:  Follow up FS.  Insulin protocol if needed.    MUSCULOSKELETAL: Bed rest for now    CODE STATUS: FULL CODE    DISPOSITION: Patient require continued monitoring in MICU.  guarded  prognosis   brain death protocol apnea test today   pallaitive care

## 2020-04-15 NOTE — PROGRESS NOTE ADULT - ASSESSMENT
Consult Summary  59 year old man presented from home with cough and shortness of breath, found to be COVID19+. Hospital course complicated by respiratory failure requiring intubation, cardiac arrest with concern for anoxic brain injury, TAMIKA, hypernatremia and hyperkalemia. Palliative care consulted for [facility].    Checked in with primary team. At this time, patient remains intubated awaiting apnea test to assess for brain death.    Morphine Equivalent Daily Dose (MEDD): NA    Recommendations:  -ongoing medical management and workup per primary team and neurology; awaiting brain death workup with apnea test today  -full code  -palliative care will be available as appropriate      Please Call [phone] PRN

## 2020-04-16 NOTE — CHART NOTE - NSCHARTNOTEFT_GEN_A_CORE
Spoke with wife at [phone]  and updated on patient's status. Answered questions, addressed concerns.

## 2020-04-16 NOTE — PROCEDURE NOTE - NSTIMEOUT_GEN_A_CORE
CC: Patient is a 84y old  Male who presents with a chief complaint of unresponsiveness (24 Jan 2018 04:33)      ## HPI:    O/N:    ## ROS:    ## Labs:      Culture - Sputum (collected 30 Jan 2018 17:52)  Source: .Sputum Sputum  Gram Stain (01 Feb 2018 18:21):    No polymorphonuclear leukocytes per low power field    No Squamous epithelial cells per low power field    No organisms seen per oil power field  Final Report (01 Feb 2018 18:21):    No growth at 48 hours    Culture - Urine (collected 30 Jan 2018 16:16)  Source: .Urine Catheterized  Final Report (31 Jan 2018 22:20):    10,000 - 49,000 CFU/mL Presumptive Candida albicans    Culture - Blood (collected 30 Jan 2018 15:09)  Source: .Blood Blood  Preliminary Report (31 Jan 2018 16:01):    No growth to date.    Culture - Urine (collected 24 Jan 2018 17:44)  Source: .Urine Catheterized  Final Report (25 Jan 2018 22:12):    No growth    Culture - Sputum (collected 24 Jan 2018 09:02)  Source: .Sputum Sput[place] received  Gram Stain (26 Jan 2018 12:16):    Moderate polymorphonuclear leukocytes per low power field    Rare Squamous epithelial cells per low power field    Few Gram Positive Cocci in Pairs and Chains per oil power field  Final Report (26 Jan 2018 12:16):    Normal Respiratory Ashly present    Culture - Blood (collected 24 Jan 2018 00:36)  Source: .Blood Blood  Final Report (29 Jan 2018 01:01):    No growth at 5 days.    Culture - Blood (collected 24 Jan 2018 00:36)  Source: .Blood Blood  Final Report (29 Jan 2018 01:01):    No growth at 5 days.        ## Imaging:    ## Medications:    ## O/E:    ## Daily Issues  - Analgesia: N/A  - Sedation: N/A  - HOB elevation: Y  - GI ppx (ulcers): N/A  - DVT ppx: Hep SC  - Nutrition: PO diet  - Central line: N  - Moreno: N    ## Assessment / Plan:    ## Code status:  Goals of care discussion: [x] yes [ ] no  [x] full code  [ ] DNR  [ ] DNI  [ ] MOLST Patient's first and last name, , procedure, and correct site confirmed prior to the start of procedure. CC: Patient is a 84y old  Male who presents with a chief complaint of unresponsiveness (24 Jan 2018 04:33)    ## HPI:  84M PMH asbestosis exposure (work hx) a/w chronic hypoxemic respiratory failure (on home O2), DM, HTN, HLD, CAD s/p CABG p/w unresponsiveness on toilet.  Intubated in ED for respiratory distress, acute respiratory failure inability to protect airway. Round to be +RSV with bilateral PNA.    O/N:  Remains intubated, day 11. Persistently febrile.    ## ROS: Unobtainable due to intubation    ## Labs:  CBC:                      8.2    11.00 )-----------( 393      ( 03 Feb 2018 04:41 )             26.1     Chem: 02-03    144  |  104  |  60<H>  ----------------------------<  186<H>  4.3   |  35<H>  |  1.36<H>    Ca    7.6<L>      03 Feb 2018 04:41  Phos  3.6     02-03  Mg     2.6     02-03    TPro  6.2  /  Alb  1.4<L>  /  TBili  0.5  /  DBili  x   /  AST  56<H>  /  ALT  41  /  AlkPhos  195<H>  02-03    POCT Blood Glucose.: 231 mg/dL (03 Feb 2018 12:05)  POCT Blood Glucose.: 203 mg/dL (03 Feb 2018 05:03)  POCT Blood Glucose.: 192 mg/dL (02 Feb 2018 23:36)  POCT Blood Glucose.: 178 mg/dL (02 Feb 2018 18:06)    Culture - Sputum (collected 30 Jan 2018 17:52) No growth at 48 hours  Culture - Urine (collected 30 Jan 2018 16:16) 10,000 - 49,000 CFU/mL Presumptive Candida albicans  Culture - Blood (collected 30 Jan 2018 15:09): No growth to date.  Culture - Urine (collected 24 Jan 2018 17:44): No growth  Culture - Sputum (collected 24 Jan 2018 09:02): Normal Respiratory Ashly present  Culture - Blood (collected 24 Jan 2018 00:36): No growth at 5 days.  Culture - Blood (collected 24 Jan 2018 00:36): No growth at 5 days.    ## Imaging:  <CXR (02.03.18 @ 08:26) >  Extensive bilateral pleural plaques appear unchanged. Underlying airspace   disease cannot be excluded. The heart size is normal. There are mild   multilevel degenerative changes of the thoracic spine.    ## Medications:  lisinopril 15 milliGRAM(s) Oral daily  metoprolol     tartrate 25 milliGRAM(s) Oral two times a day    fluconAZOLE IVPB 200 milliGRAM(s) IV Intermittent every 24 hours    acetaminophen   Tablet 650 milliGRAM(s) Oral every 6 hours PRN  fentaNYL    Injectable 25 MICROGram(s) IV Push every 6 hours PRN  midazolam Infusion 1 mG/Hr IV Continuous <Continuous>    aspirin  chewable 81 milliGRAM(s) Oral daily  atorvastatin 40 milliGRAM(s) Oral at bedtime  insulin lispro (HumaLOG) corrective regimen sliding scale   SubCutaneous every 6 hours    pantoprazole   Suspension 40 milliGRAM(s) Oral daily  heparin  Injectable 5000 Unit(s) SubCutaneous every 12 hours    ## O/E:  T(C): 38.2 (03 Feb 2018 12:00), Max: 39.3 (03 Feb 2018 08:00)  HR: 94 (03 Feb 2018 16:00) (74 - 112)  BP: 111/53 (03 Feb 2018 16:00) (92/51 - 174/64)  BP(mean): 68 (03 Feb 2018 16:00) (59 - 93)  RR: 19 (03 Feb 2018 16:00) (14 - 26)  SpO2: 97% (03 Feb 2018 16:00) (93% - 99%)  IN: 2871 mL / OUT: 3 mL / NET: +2868 mL    Mode: AC/ CMV (Assist Control/ Continuous Mandatory Ventilation), RR (machine): 12, TV (machine): 420, FiO2: 30, PEEP: 5, ITime: 0.91, MAP: 12, PIP: 32  Gen: orotracheally intubated on full vent  HEENT: sluggish but equal pupils  Resp: mechanical BS B/L  CVS: S1S2 no m/r/g  Abd: soft NT/ND +BS  Ext: no c/c/e  Neuro: sedated, opens eyes to voice    ## Daily Issues  - Analgesia: fentanyl PRN  - Sedation: Versed  - HOB elevation: Y  - GI ppx (ulcers): PPI  - DVT ppx: Hep SC  - Nutrition: tube feeds    ## Assessment / Plan:  84M PMH asbestos exposure with pleural plaques now with acute-on-chronic respiratory failure +RSV  - Intubated day 11  - continue current vent settings (12/420/30%/+5)  - Daily SAT / SBT. Patient failed SBT today with tachypnea and non-focal agitation. Will check diaphragm tomorrow during SBT.  - Episodes of bradycardia on Precedex, propofol and fentanyl, currently on Versed with fentanyl PRN  - Tube feeds at goal  - s/p 14 total days of antibiotics (initially Zosyn, then vanco / cefepime) with no positive cultures / evidence of bacterial infection. Can D/C today  - will remain on diflucan for 3 days total for ?Candida in urine (no evidence of systemic infection)  - order procalcitonin in AM to trend  - given number of days intubated with no significant improvement in weaning, will need trache / PEG. Talks with family re: plans / goals of care are ongoing.    ## Code status:  Goals of care discussion: [x] yes [ ] no  [x] full code  [ ] DNR  [ ] DNI  [ ] AYDEN CC: Patient is a 84y old  Male who presents with a chief complaint of unresponsiveness (24 Jan 2018 04:33)    ## HPI:  84M PMH asbestosis exposure (work hx) a/w chronic hypoxemic respiratory failure (on home O2), DM, HTN, HLD, CAD s/p CABG p/w unresponsiveness on toilet.  Intubated in ED for respiratory distress, acute respiratory failure inability to protect airway. Round to be +RSV with bilateral PNA.    O/N:  Remains intubated, day 11. Persistently febrile.    ## ROS: Unobtainable due to intubation    ## Labs:  CBC:                      8.2    11.00 )-----------( 393      ( 03 Feb 2018 04:41 )             26.1     Chem: 02-03    144  |  104  |  60<H>  ----------------------------<  186<H>  4.3   |  35<H>  |  1.36<H>    Ca    7.6<L>      03 Feb 2018 04:41  Phos  3.6     02-03  Mg     2.6     02-03    TPro  6.2  /  Alb  1.4<L>  /  TBili  0.5  /  DBili  x   /  AST  56<H>  /  ALT  41  /  AlkPhos  195<H>  02-03    POCT Blood Glucose.: 231 mg/dL (03 Feb 2018 12:05)  POCT Blood Glucose.: 203 mg/dL (03 Feb 2018 05:03)  POCT Blood Glucose.: 192 mg/dL (02 Feb 2018 23:36)  POCT Blood Glucose.: 178 mg/dL (02 Feb 2018 18:06)    Culture - Sputum (collected 30 Jan 2018 17:52) No growth at 48 hours  Culture - Urine (collected 30 Jan 2018 16:16) 10,000 - 49,000 CFU/mL Presumptive Candida albicans  Culture - Blood (collected 30 Jan 2018 15:09): No growth to date.  Culture - Urine (collected 24 Jan 2018 17:44): No growth  Culture - Sputum (collected 24 Jan 2018 09:02): Normal Respiratory Ashly present  Culture - Blood (collected 24 Jan 2018 00:36): No growth at 5 days.  Culture - Blood (collected 24 Jan 2018 00:36): No growth at 5 days.    ## Imaging:  <CXR (02.03.18 @ 08:26) >  Extensive bilateral pleural plaques appear unchanged. Underlying airspace   disease cannot be excluded. The heart size is normal. There are mild   multilevel degenerative changes of the thoracic spine.    ## Medications:  lisinopril 15 milliGRAM(s) Oral daily  metoprolol     tartrate 25 milliGRAM(s) Oral two times a day    fluconAZOLE IVPB 200 milliGRAM(s) IV Intermittent every 24 hours    acetaminophen   Tablet 650 milliGRAM(s) Oral every 6 hours PRN  fentaNYL    Injectable 25 MICROGram(s) IV Push every 6 hours PRN  midazolam Infusion 1 mG/Hr IV Continuous <Continuous>    aspirin  chewable 81 milliGRAM(s) Oral daily  atorvastatin 40 milliGRAM(s) Oral at bedtime  insulin lispro (HumaLOG) corrective regimen sliding scale   SubCutaneous every 6 hours    pantoprazole   Suspension 40 milliGRAM(s) Oral daily  heparin  Injectable 5000 Unit(s) SubCutaneous every 12 hours    ## O/E:  T(C): 38.2 (03 Feb 2018 12:00), Max: 39.3 (03 Feb 2018 08:00)  HR: 94 (03 Feb 2018 16:00) (74 - 112)  BP: 111/53 (03 Feb 2018 16:00) (92/51 - 174/64)  BP(mean): 68 (03 Feb 2018 16:00) (59 - 93)  RR: 19 (03 Feb 2018 16:00) (14 - 26)  SpO2: 97% (03 Feb 2018 16:00) (93% - 99%)  IN: 2871 mL / OUT: 3 mL / NET: +2868 mL    Mode: AC/ CMV (Assist Control/ Continuous Mandatory Ventilation), RR (machine): 12, TV (machine): 420, FiO2: 30, PEEP: 5, ITime: 0.91, MAP: 12, PIP: 32  Gen: orotracheally intubated on full vent  HEENT: sluggish but equal pupils  Resp: mechanical BS B/L  CVS: S1S2 no m/r/g  Abd: soft NT/ND +BS  Ext: no c/c/e  Neuro: sedated, opens eyes to voice    ## Daily Issues  - Analgesia: fentanyl PRN  - Sedation: Versed  - HOB elevation: Y  - GI ppx (ulcers): PPI  - DVT ppx: Hep SC  - Nutrition: tube feeds    ## Assessment / Plan:  84M PMH asbestos exposure with pleural plaques now with acute-on-chronic respiratory failure +RSV  - Intubated day 11  - continue current vent settings (12/420/30%/+5)  - Daily SAT / SBT. Patient failed SBT today with tachypnea and non-focal agitation. Will check diaphragm tomorrow during SBT.  - Episodes of bradycardia on Precedex, propofol and fentanyl, currently on Versed with fentanyl PRN  - Tube feeds at goal  - s/p 14 total days of antibiotics (initially Zosyn, then vanco / cefepime) with no positive cultures / evidence of bacterial infection. Can D/C today  - will remain on diflucan for 3 days total for ?Candida in urine (no evidence of systemic infection)  - order procalcitonin in AM to trend  - given number of days intubated with no significant improvement in weaning, will need trache / PEG. Talks with family re: plans / goals of care are ongoing.    ## Code status:  Goals of care discussion: [x] yes [ ] no  [x] full code  [ ] DNR  [ ] DNI  [ ] MOLST    Total attending critical care time spent: 50 minutes

## 2020-04-16 NOTE — PROCEDURE NOTE - NSPROCDETAILS_GEN_ALL_CORE
ultrasound guidance/positive blood return obtained via catheter/connected to a pressurized flush line/sutured in place/all materials/supplies accounted for at end of procedure/location identified, draped/prepped, sterile technique used, needle inserted/introduced
location identified, draped/prepped, sterile technique used, needle inserted/introduced/positive blood return obtained via catheter/all materials/supplies accounted for at end of procedure/connected to a pressurized flush line/ultrasound guidance/sutured in place
ultrasound guidance/location identified, draped/prepped, sterile technique used, needle inserted/introduced/connected to a pressurized flush line/sutured in place/hemostasis with direct pressure, dressing applied/Seldinger technique/all materials/supplies accounted for at end of procedure/positive blood return obtained via catheter
guidewire recovered/lumen(s) aspirated and flushed/sterile dressing applied/sterile technique, catheter placed

## 2020-04-16 NOTE — PROCEDURE NOTE - NSINDICATIONS_GEN_A_CORE
arterial puncture to obtain ABG's/critical patient/monitoring purposes
arterial puncture to obtain ABG's/blood sampling/critical patient/monitoring purposes
emergency venous access
arterial puncture to obtain ABG's/blood sampling/critical patient

## 2020-04-16 NOTE — PROGRESS NOTE ADULT - ASSESSMENT
Assessment:    Acute hypoxemic respiratory failure secondary to SARS-COV-2  SARS-COV-2 PNA worsening status  Betsey improving   hyperkalemia   s/p code Blue ??  anoxic encephalopathy   PLAN:    CNS  recall neurology over breathing and has gag reflex will not do apnea test   will do apnea test today     HEENT:  Oral care    PULMONARY:  HOB @ 45 degrees,  keep same vent setting     CARDIOVASCULAR:     GI: GI prophylaxis Protonix                                         Feeding: NG feed   FREE WATER     RENAL:  F/u  lytes.on inuslin   Lokelma as per renal   check CK level  start d5 w @ 70cc/ hr     INFECTIOUS DISEASE s/p HCQ    bld cx   on Rocephin   follow  DTA   bld cx     HEMATOLOGICAL:  DVT prophylaxis.    ENDOCRINE:  Follow up FS.  Insulin protocol if needed.    MUSCULOSKELETAL: Bed rest for now    CODE STATUS: FULL CODE    DISPOSITION: Patient require continued monitoring in MICU.  guarded  prognosis   brain death protocol apnea test t  pallaitive care

## 2020-04-16 NOTE — PROGRESS NOTE ADULT - SUBJECTIVE AND OBJECTIVE BOX
24H events:    Patient is a 59y old Male who presents with a chief complaint of ARf (16 Apr 2020 09:10)    Primary diagnosis of    Today is hospital day 16d. This morning patient was seen and examined at bedside, resting comfortably in bed.      PAST MEDICAL & SURGICAL HISTORY  No significant past surgical history    SOCIAL HISTORY:  Social History:      ALLERGIES:  No Known Allergies    MEDICATIONS:  STANDING MEDICATIONS  cefTRIAXone   IVPB 2000 milliGRAM(s) IV Intermittent every 24 hours  chlorhexidine 0.12% Liquid 15 milliLiter(s) Oral Mucosa every 12 hours  chlorhexidine 4% Liquid 1 Application(s) Topical <User Schedule>  dextrose 5%. 1000 milliLiter(s) IV Continuous <Continuous>  dextrose 50% Injectable 12.5 Gram(s) IV Push once  dextrose 50% Injectable 25 Gram(s) IV Push once  dextrose 50% Injectable 25 Gram(s) IV Push once  dextrose 50% Injectable 50 milliLiter(s) IV Push once  dextrose 50% Injectable 50 milliLiter(s) IV Push once  heparin  Injectable 5000 Unit(s) SubCutaneous every 8 hours  influenza   Vaccine 0.5 milliLiter(s) IntraMuscular once  insulin regular Infusion 2 Unit(s)/Hr IV Continuous <Continuous>  methylPREDNISolone sodium succinate Injectable 60 milliGRAM(s) IV Push two times a day  norepinephrine Infusion 1.5 MICROgram(s)/kG/Min IV Continuous <Continuous>  QUEtiapine 50 milliGRAM(s) Oral daily  sodium zirconium cyclosilicate 10 Gram(s) Oral every 12 hours    PRN MEDICATIONS  acetaminophen   Tablet .. 650 milliGRAM(s) Oral every 6 hours PRN  dextrose 40% Gel 15 Gram(s) Oral once PRN  glucagon  Injectable 1 milliGRAM(s) IntraMuscular once PRN    VITALS:   T(F): 97.2  HR: 89  BP: 137/51  RR: 22  SpO2: 99%    LABS:                        9.0    21.53 )-----------( 277      ( 15 Apr 2020 05:20 )             27.6     04-15    153<H>  |  110  |  155<HH>  ----------------------------<  220<H>  4.7   |  26  |  3.1<H>    Ca    8.8      15 Apr 2020 05:20  Phos  8.2     04-15  Mg     2.6     04-15    TPro  5.5<L>  /  Alb  2.6<L>  /  TBili  0.3  /  DBili  x   /  AST  185<H>  /  ALT  333<H>  /  AlkPhos  62  04-15        ABG - ( 16 Apr 2020 04:18 )  pH, Arterial: 7.44  pH, Blood: x     /  pCO2: 41    /  pO2: 73    / HCO3: 28    / Base Excess: 3.4   /  SaO2: 96                RADIOLOGY:  < from: Xray Chest 1 View- PORTABLE-Routine (04.16.20 @ 05:38) >  Impression:    Resolution of prior right basilar opacity.     Radiographically subtle left retrocardiac opacity. No pneumothorax    < end of copied text >      PHYSICAL EXAM:  GEN: Intubated - No sedation -  LUNGS: CTABL, Symmetrical chest wall movement  HEART: +S1,S2, regular rate and rhythm, No murmurs, Rubs, Gallops appreciated  ABD: Bowel Sounds Present, Soft, non tender, non distended, no guarding, no rebound.   NEURO: mild gag reflex, no withdrawal to painful stimuli       Assessment and Plan:   · Assessment		  This is a 59 year old male with no PMHx who presented with complaints of shortness of breath and cough for one week    #Acute Hypoxemic Respiratory Failure secondary to viral pneumonia as a result of COVID-19 with Cytokine Storm   - COVID-19 Positive  - Intubated off sedation , off pressors   - Completed Hydroxychloroquine, [place], Azithromycin,   - Cultures negative    #Suspected Anoxic Brain injury secondary to Code Blue  -CTH : consistent with hypoxic ischemic encephalopathy  -EEG: Performed - Pending Results  - f/u Neuro  recs      #TAMIKA  - Nephrology Consult Appreciated  - Urine osmol 496 April 13  - switch to  D5 1/2 @ 70   - Continues to have good urine output  - Avoid nephrotoxins    # Hypernatremia   - Today 153  - Urine osmol 496 April 13  - Continue on Free water with feeds  - switch to  D5 1/2 @ 70   - Will monitor    #Hyperkalemia  - Nepro Feeds  - Continue Lokelma  - Will monitor      # Activity: Bedrest  # Diet: NPO with tube feeds - nephro  # DVT ppx: Heparin  # GI ppx: Protonix  # Code Status: Full Code  # DISPO: Acute

## 2020-04-16 NOTE — PROGRESS NOTE ADULT - SUBJECTIVE AND OBJECTIVE BOX
Brief HPI  59 year old man presented from home with cough and shortness of breath, found to be COVID19+. Hospital course complicated by respiratory failure requiring intubation, cardiac arrest with concern for anoxic brain injury, TAMIKA, hypernatremia and hyperkalemia. Palliative care consulted for [facility].    Interval History  -Checked in with primary team  -At this time, patient has regained gag reflex and is overbreathing the vent  -Called and spoke with the patient's wife, Shweta  -Shared that because the patient had regained a gag and was overbreathing the vent, he would not meet for brain death  -We discussed continued overall poor prognosis and low chance of recovery, as well as code status and multiple treatment options, including comfort measures   -Patient's wife shared that she has multiple teenage children with the patient and she wants to maintain full aggressive measures for them, as the patient might get better in several months  -All questions answered      PHYSICAL EXAM    T(C): , Max: 36.7 (20:15)  T(F): 97.2  HR: 95 (89 - 110)  BP: 132/65 (119/59 - 154/71)  RR: 22 (22 - 23)  SpO2: 95% (95% - 100%)    PER PRIMARY TEAM          LABS:                          8.3    19.96 )-----------( 219      ( 16 Apr 2020 12:18 )             25.5                                                                                      04-15    153<H>  |  110  |  155<HH>  ----------------------------<  220<H>  4.7   |  26  |  3.1<H>    Ca    8.8      15 Apr 2020 05:20  Phos  8.2     04-15  Mg     2.6     04-15    TPro  5.5<L>  /  Alb  2.6<L>  /  TBili  0.3  /  DBili  x   /  AST  185<H>  /  ALT  333<H>  /  AlkPhos  62  04-15                                                      MEDICATIONS  (STANDING):  cefTRIAXone   IVPB 2000 milliGRAM(s) IV Intermittent every 24 hours  chlorhexidine 0.12% Liquid 15 milliLiter(s) Oral Mucosa every 12 hours  chlorhexidine 4% Liquid 1 Application(s) Topical <User Schedule>  dextrose 5%. 1000 milliLiter(s) (70 mL/Hr) IV Continuous <Continuous>  dextrose 50% Injectable 12.5 Gram(s) IV Push once  dextrose 50% Injectable 25 Gram(s) IV Push once  dextrose 50% Injectable 25 Gram(s) IV Push once  dextrose 50% Injectable 50 milliLiter(s) IV Push once  dextrose 50% Injectable 50 milliLiter(s) IV Push once  heparin  Injectable 5000 Unit(s) SubCutaneous every 8 hours  influenza   Vaccine 0.5 milliLiter(s) IntraMuscular once  insulin regular Infusion 2 Unit(s)/Hr (2 mL/Hr) IV Continuous <Continuous>  methylPREDNISolone sodium succinate Injectable 60 milliGRAM(s) IV Push two times a day  norepinephrine Infusion 1.5 MICROgram(s)/kG/Min (106 mL/Hr) IV Continuous <Continuous>  QUEtiapine 50 milliGRAM(s) Oral daily  sodium zirconium cyclosilicate 10 Gram(s) Oral every 12 hours    MEDICATIONS  (PRN):  acetaminophen   Tablet .. 650 milliGRAM(s) Oral every 6 hours PRN Temp greater or equal to 38C (100.4F)  dextrose 40% Gel 15 Gram(s) Oral once PRN Blood Glucose LESS THAN 70 milliGRAM(s)/deciliter  glucagon  Injectable 1 milliGRAM(s) IntraMuscular once PRN Glucose LESS THAN 70 milligrams/deciliter

## 2020-04-16 NOTE — PROGRESS NOTE ADULT - ASSESSMENT
Consult Summary  59 year old man presented from home with cough and shortness of breath, found to be COVID19+. Hospital course complicated by respiratory failure requiring intubation, cardiac arrest with concern for anoxic brain injury, TAMIKA, hypernatremia and hyperkalemia. Palliative care consulted for [facility].    Checked in with primary team. At this time, patient has regained gag reflex and is overbreathing the vent. Called and spoke with the patient's wife, Shweta. Shared that because the patient had regained a gag and was overbreathing the vent, he would not meet for brain death. We discussed continued overall poor prognosis and low chance of meaningful neurological recovery, as well as code status and multiple treatment options, including comfort measures . Patient's wife shared that she has multiple teenage children with the patient and she wants to maintain full aggressive measures for them, as she believes the patient might get better in several months. All questions answered.    20 minutes spent on advance care planning.    Morphine Equivalent Daily Dose (MEDD): NA    Recommendations:  -ongoing medical management, workup, and treatment per neurology and primary team  -full code and full aggressive measures per discussion with the patient's wife today  -palliative care will be available as appropriate for [facility] discussions      Please Call [phone] PRN

## 2020-04-16 NOTE — PROGRESS NOTE ADULT - ASSESSMENT
TAMIKA/ hyperkalemia/ hypernatremia/ acute respiratory failure/ COVID posyive   # creatinine  improving   # non oliguric   # BUN noted : highly catabolic , and steroids, prerenal   # k improving  continue lokelma 10g q 12  # change feed to nepro  # hypernatremia,  iv fluids d5  at 75 cc/h , free water with feed   # needs neurology f/up   # followed by palliative care  # no acute indication for RRT   # will follow    # overall prognosis poor

## 2020-04-16 NOTE — PROGRESS NOTE ADULT - SUBJECTIVE AND OBJECTIVE BOX
Patient is a 59y old  Male who presents with a chief complaint of resp failure (15 Apr 2020 18:03)      Over Night Events:  Patient seen and examined.   stoill vented   he is over breathing and triggering vent on cpap   also mild gag reflex   ROS:  See HPI    PHYSICAL EXAM    ICU Vital Signs Last 24 Hrs  T(C): 35.9 (16 Apr 2020 06:45), Max: 36.7 (15 Apr 2020 15:30)  T(F): 96.7 (16 Apr 2020 06:45), Max: 98.1 (15 Apr 2020 15:30)  HR: 89 (16 Apr 2020 07:00) (87 - 110)  BP: 119/59 (16 Apr 2020 07:00) (119/59 - 171/82)  BP(mean): 110 (16 Apr 2020 06:45) (72 - 118)  ABP: --  ABP(mean): --  RR: 22 (16 Apr 2020 07:00) (22 - 23)  SpO2: 99% (16 Apr 2020 07:00) (99% - 100%)      General: as above has mild gag   HEENT:  et tube                Lymph Nodes: NO cervical LN   Lungs: Bilateral BS  Cardiovascular: Regular   Abdomen: Soft, Positive BS  Extremities: No clubbing   Skin: warm   Neurological: positive gag mild   Musculoskeletal: move all ext     I&O's Detail    15 Apr 2020 07:01  -  16 Apr 2020 07:00  --------------------------------------------------------  IN:    dextrose 5% + sodium chloride 0.45%.: 75 mL    insulin regular Infusion: 6 mL  Total IN: 81 mL    OUT:    Indwelling Catheter - Urethral: 2200 mL  Total OUT: 2200 mL    Total NET: -2119 mL      16 Apr 2020 07:01  -  16 Apr 2020 09:10  --------------------------------------------------------  IN:  Total IN: 0 mL    OUT:    Indwelling Catheter - Urethral: 650 mL  Total OUT: 650 mL    Total NET: -650 mL          LABS:                          9.0    21.53 )-----------( 277      ( 15 Apr 2020 05:20 )             27.6         15 Apr 2020 05:20    153    |  110    |  155    ----------------------------<  220    4.7     |  26     |  3.1      Ca    8.8        15 Apr 2020 05:20  Phos  8.2       15 Apr 2020 05:20  Mg     2.6       15 Apr 2020 05:20                                                                                                                                                                                               Mode: PCV  RR (machine): 22  FiO2: 40  PEEP: 8  PC: 20  PIP: 20                                      ABG - ( 16 Apr 2020 04:18 )  pH, Arterial: 7.44  pH, Blood: x     /  pCO2: 41    /  pO2: 73    / HCO3: 28    / Base Excess: 3.4   /  SaO2: 96                  MEDICATIONS  (STANDING):  cefTRIAXone   IVPB 2000 milliGRAM(s) IV Intermittent every 24 hours  chlorhexidine 0.12% Liquid 15 milliLiter(s) Oral Mucosa every 12 hours  chlorhexidine 4% Liquid 1 Application(s) Topical <User Schedule>  dextrose 10%. 250 milliLiter(s) (500 mL/Hr) IV Continuous <Continuous>  dextrose 10%. 250 milliLiter(s) (500 mL/Hr) IV Continuous <Continuous>  dextrose 10%. 250 milliLiter(s) (250 mL/Hr) IV Continuous <Continuous>  dextrose 5% + sodium chloride 0.45%. 1000 milliLiter(s) (75 mL/Hr) IV Continuous <Continuous>  dextrose 5%. 1000 milliLiter(s) (50 mL/Hr) IV Continuous <Continuous>  dextrose 50% Injectable 12.5 Gram(s) IV Push once  dextrose 50% Injectable 25 Gram(s) IV Push once  dextrose 50% Injectable 25 Gram(s) IV Push once  dextrose 50% Injectable 50 milliLiter(s) IV Push once  dextrose 50% Injectable 50 milliLiter(s) IV Push once  heparin  Injectable 5000 Unit(s) SubCutaneous every 8 hours  influenza   Vaccine 0.5 milliLiter(s) IntraMuscular once  insulin regular Infusion 2 Unit(s)/Hr (2 mL/Hr) IV Continuous <Continuous>  methylPREDNISolone sodium succinate Injectable 60 milliGRAM(s) IV Push two times a day  norepinephrine Infusion 1.5 MICROgram(s)/kG/Min (106 mL/Hr) IV Continuous <Continuous>  QUEtiapine 50 milliGRAM(s) Oral daily  sodium zirconium cyclosilicate 10 Gram(s) Oral every 12 hours    MEDICATIONS  (PRN):  acetaminophen   Tablet .. 650 milliGRAM(s) Oral every 6 hours PRN Temp greater or equal to 38C (100.4F)  dextrose 40% Gel 15 Gram(s) Oral once PRN Blood Glucose LESS THAN 70 milliGRAM(s)/deciliter  glucagon  Injectable 1 milliGRAM(s) IntraMuscular once PRN Glucose LESS THAN 70 milligrams/deciliter          Xrays:  TLC:  OG:  ET tube:                                                                                       ECHO:  [facility] ICU:

## 2020-04-16 NOTE — PROGRESS NOTE ADULT - SUBJECTIVE AND OBJECTIVE BOX
AMAYA ORTEGA  59y, Male    All available historical data reviewed    OVERNIGHT EVENTS:    fio2 40%  ROS:  unable to obtain history secondary to patient's mental status and/or sedation     VITALS:  T(F): 97.2, Max: 98.1 (04-15-20 @ 15:30)  HR: 89  BP: 137/51  RR: 22Vital Signs Last 24 Hrs  T(C): 36.2 (16 Apr 2020 07:00), Max: 36.7 (15 Apr 2020 15:30)  T(F): 97.2 (16 Apr 2020 07:00), Max: 98.1 (15 Apr 2020 15:30)  HR: 89 (16 Apr 2020 09:46) (87 - 110)  BP: 137/51 (16 Apr 2020 09:46) (119/59 - 171/82)  BP(mean): 110 (16 Apr 2020 06:45) (72 - 118)  RR: 22 (16 Apr 2020 09:46) (22 - 23)  SpO2: 99% (16 Apr 2020 09:46) (99% - 100%)    TESTS & MEASUREMENTS:                        9.0    21.53 )-----------( 277      ( 15 Apr 2020 05:20 )             27.6     04-15    153<H>  |  110  |  155<HH>  ----------------------------<  220<H>  4.7   |  26  |  3.1<H>    Ca    8.8      15 Apr 2020 05:20  Phos  8.2     04-15  Mg     2.6     04-15    TPro  5.5<L>  /  Alb  2.6<L>  /  TBili  0.3  /  DBili  x   /  AST  185<H>  /  ALT  333<H>  /  AlkPhos  62  04-15    LIVER FUNCTIONS - ( 15 Apr 2020 05:20 )  Alb: 2.6 g/dL / Pro: 5.5 g/dL / ALK PHOS: 62 U/L / ALT: 333 U/L / AST: 185 U/L / GGT: x             Culture - Bronchial (collected 04-10-20 @ 15:00)  Source: Bronch Wash Bronchoalveolar Lavage  Gram Stain (04-11-20 @ 03:22):    Few polymorphonuclear leukocytes seen per low power field    Moderate Squamous epithelial cells seen per low power field    Moderate Gram positive cocci in pairs seen per oil power field  Final Report (04-12-20 @ 17:44):    Normal Respiratory Quynh present            RADIOLOGY & ADDITIONAL TESTS:  Personal review of radiological diagnostics performed  Echo and EKG results noted when applicable.     MEDICATIONS:  acetaminophen   Tablet .. 650 milliGRAM(s) Oral every 6 hours PRN  cefTRIAXone   IVPB 2000 milliGRAM(s) IV Intermittent every 24 hours  chlorhexidine 0.12% Liquid 15 milliLiter(s) Oral Mucosa every 12 hours  chlorhexidine 4% Liquid 1 Application(s) Topical <User Schedule>  dextrose 40% Gel 15 Gram(s) Oral once PRN  dextrose 5%. 1000 milliLiter(s) IV Continuous <Continuous>  dextrose 50% Injectable 12.5 Gram(s) IV Push once  dextrose 50% Injectable 25 Gram(s) IV Push once  dextrose 50% Injectable 25 Gram(s) IV Push once  dextrose 50% Injectable 50 milliLiter(s) IV Push once  dextrose 50% Injectable 50 milliLiter(s) IV Push once  glucagon  Injectable 1 milliGRAM(s) IntraMuscular once PRN  heparin  Injectable 5000 Unit(s) SubCutaneous every 8 hours  influenza   Vaccine 0.5 milliLiter(s) IntraMuscular once  insulin regular Infusion 2 Unit(s)/Hr IV Continuous <Continuous>  methylPREDNISolone sodium succinate Injectable 60 milliGRAM(s) IV Push two times a day  norepinephrine Infusion 1.5 MICROgram(s)/kG/Min IV Continuous <Continuous>  QUEtiapine 50 milliGRAM(s) Oral daily  sodium zirconium cyclosilicate 10 Gram(s) Oral every 12 hours      ANTIBIOTICS:  cefTRIAXone   IVPB 2000 milliGRAM(s) IV Intermittent every 24 hours

## 2020-04-16 NOTE — PROGRESS NOTE ADULT - SUBJECTIVE AND OBJECTIVE BOX
24 h events noted  intubated/ventilated         PAST HISTORY  --------------------------------------------------------------------------------  No significant changes to PMH, PSH, FHx, SHx, unless otherwise noted    ALLERGIES & MEDICATIONS  --------------------------------------------------------------------------------  Allergies    No Known Allergies    Intolerances      Standing Inpatient Medications  cefTRIAXone   IVPB 2000 milliGRAM(s) IV Intermittent every 24 hours  chlorhexidine 0.12% Liquid 15 milliLiter(s) Oral Mucosa every 12 hours  chlorhexidine 4% Liquid 1 Application(s) Topical <User Schedule>  dextrose 5%. 1000 milliLiter(s) IV Continuous <Continuous>  dextrose 50% Injectable 12.5 Gram(s) IV Push once  dextrose 50% Injectable 25 Gram(s) IV Push once  dextrose 50% Injectable 25 Gram(s) IV Push once  dextrose 50% Injectable 50 milliLiter(s) IV Push once  dextrose 50% Injectable 50 milliLiter(s) IV Push once  heparin  Injectable 5000 Unit(s) SubCutaneous every 8 hours  influenza   Vaccine 0.5 milliLiter(s) IntraMuscular once  insulin regular Infusion 2 Unit(s)/Hr IV Continuous <Continuous>  methylPREDNISolone sodium succinate Injectable 60 milliGRAM(s) IV Push two times a day  norepinephrine Infusion 1.5 MICROgram(s)/kG/Min IV Continuous <Continuous>  QUEtiapine 50 milliGRAM(s) Oral daily  sodium zirconium cyclosilicate 10 Gram(s) Oral every 12 hours    PRN Inpatient Medications  acetaminophen   Tablet .. 650 milliGRAM(s) Oral every 6 hours PRN  dextrose 40% Gel 15 Gram(s) Oral once PRN  glucagon  Injectable 1 milliGRAM(s) IntraMuscular once PRN          VITALS/PHYSICAL EXAM  --------------------------------------------------------------------------------  T(C): 35.9 (04-16-20 @ 06:45), Max: 36.7 (04-15-20 @ 15:30)  HR: 89 (04-16-20 @ 07:00) (87 - 110)  BP: 119/59 (04-16-20 @ 07:00) (119/59 - 171/82)  RR: 22 (04-16-20 @ 07:00) (22 - 23)  SpO2: 99% (04-16-20 @ 07:00) (99% - 100%)  Wt(kg): --        04-15-20 @ 07:01  -  04-16-20 @ 07:00  --------------------------------------------------------  IN: 81 mL / OUT: 2200 mL / NET: -2119 mL    04-16-20 @ 07:01  -  04-16-20 @ 09:26  --------------------------------------------------------  IN: 0 mL / OUT: 650 mL / NET: -650 mL      Physical Exam:  	Gen: intubated/ventilated    LABS/STUDIES  --------------------------------------------------------------------------------              9.0    21.53 >-----------<  277      [04-15-20 @ 05:20]              27.6     153  |  110  |  155  ----------------------------<  220      [04-15-20 @ 05:20]  4.7   |  26  |  3.1        Ca     8.8     [04-15-20 @ 05:20]      Mg     2.6     [04-15-20 @ 05:20]      Phos  8.2     [04-15-20 @ 05:20]    TPro  5.5  /  Alb  2.6  /  TBili  0.3  /  DBili  x   /  AST  185  /  ALT  333  /  AlkPhos  62  [04-15-20 @ 05:20]      LDH 1096      [04-15-20 @ 05:20]    Creatinine Trend:  SCr 3.1 [04-15 @ 05:20]  SCr 3.9 [04-14 @ 05:30]  SCr 4.4 [04-13 @ 16:35]  SCr 3.8 [04-13 @ 04:30]  SCr 3.6 [04-12 @ 06:21]    Urinalysis - [04-10-20 @ 16:00]      Color Light Yellow / Appearance Slightly Turbid / SG 1.015 / pH 5.0      Gluc 200 mg/dL / Ketone Negative  / Bili Negative / Urobili <2 mg/dL       Blood Negative / Protein Trace / Leuk Est Small / Nitrite Negative      RBC 14 / WBC 18 / Hyaline 5 / Gran  / Sq Epi  / Non Sq Epi 2 / Bacteria Negative    Urine Creatinine 51      [04-10-20 @ 16:00]  Urine Protein 17      [04-10-20 @ 16:00]  Urine Sodium 44.0      [04-10-20 @ 16:00]  Urine Osmolality 496      [04-13-20 @ 12:15]    Ferritin 1886      [04-12-20 @ 06:21]  HbA1c 6.2      [04-03-20 @ 08:25]  TSH 0.04      [04-03-20 @ 08:25]  Lipid: chol --, , HDL --, LDL --      [04-13-20 @ 04:30]

## 2020-04-16 NOTE — PROGRESS NOTE ADULT - ASSESSMENT
· Assessment		  59M from home, lives with family, with no pertinent PMHx or PSHx presents to the ED with complaints of one week of a cough and shortness of breath.    IMPRESSION;   COVID19 with resolving septic shock, off  pressors, mechanical ventilation with ARDS with FiO2 40%, secondary to Cytokine Release Syndrome  -end organ damage with renal failure and mild transaminitis  -CXR with b/l opacities   -inflammatory markers significantly elevated. d Dimer [id number]  -elevated Ddimer and Ferritin are poor prognostic indicators and differentiate survivors from non survivors  procal of 1.5 suggestive of a bacterial coinfection  BCx NG  WBc 21.5  nares ORSA NG  bronch 4/10 NG    RECOMMENDATIONS;   s/p PLAQUENIL  prognosis is very poor

## 2020-04-17 NOTE — PROGRESS NOTE ADULT - SUBJECTIVE AND OBJECTIVE BOX
SUBJECTIVE:    Patient is a 59y old Male who presents with a chief complaint of ARf (17 Apr 2020 09:35)    INTERVAL EVENTS: pt breathing over vent, need neurology f/u     PAST MEDICAL & SURGICAL HISTORY  No significant past surgical history      ALLERGIES:  No Known Allergies    MEDICATIONS:  STANDING MEDICATIONS  cefTRIAXone   IVPB 2000 milliGRAM(s) IV Intermittent every 24 hours  chlorhexidine 0.12% Liquid 15 milliLiter(s) Oral Mucosa every 12 hours  chlorhexidine 4% Liquid 1 Application(s) Topical <User Schedule>  dextrose 5%. 1000 milliLiter(s) IV Continuous <Continuous>  dextrose 50% Injectable 12.5 Gram(s) IV Push once  dextrose 50% Injectable 25 Gram(s) IV Push once  dextrose 50% Injectable 25 Gram(s) IV Push once  dextrose 50% Injectable 50 milliLiter(s) IV Push once  dextrose 50% Injectable 50 milliLiter(s) IV Push once  heparin  Injectable 5000 Unit(s) SubCutaneous every 8 hours  influenza   Vaccine 0.5 milliLiter(s) IntraMuscular once  insulin regular Infusion 2 Unit(s)/Hr IV Continuous <Continuous>  methylPREDNISolone sodium succinate Injectable 60 milliGRAM(s) IV Push two times a day  norepinephrine Infusion 1.5 MICROgram(s)/kG/Min IV Continuous <Continuous>  QUEtiapine 50 milliGRAM(s) Oral daily  sodium zirconium cyclosilicate 10 Gram(s) Oral every 12 hours    PRN MEDICATIONS  acetaminophen   Tablet .. 650 milliGRAM(s) Oral every 6 hours PRN  dextrose 40% Gel 15 Gram(s) Oral once PRN  glucagon  Injectable 1 milliGRAM(s) IntraMuscular once PRN    VITALS:   T(F): 96.3  HR: 90  BP: 123/65  RR: 26  SpO2: 100%    LABS:                        7.6    23.02 )-----------( 206      ( 17 Apr 2020 04:30 )             23.4     04-17    154<H>  |  113<H>  |  134<HH>  ----------------------------<  204<H>  4.3   |  26  |  2.4<H>    Ca    8.5      17 Apr 2020 04:30    TPro  4.9<L>  /  Alb  2.3<L>  /  TBili  0.3  /  DBili  x   /  AST  91<H>  /  ALT  249<H>  /  AlkPhos  61  04-17        ABG - ( 17 Apr 2020 08:15 )  pH, Arterial: 7.46  pH, Blood: x     /  pCO2: 37    /  pO2: 153   / HCO3: 26    / Base Excess: 2.3   /  SaO2: 99                        RADIOLOGY:    PHYSICAL EXAM:  GEN: intubated off sedation  PULM/CHEST: Clear to auscultation bilaterally,   CVS: Regular rate and rhythm, S1-S2, no murmurs  ABD: Soft, non-tender, non-distended, +BS  EXT: No edema  NEURO: AAOx3    Patino Catheter:   Indwelling Urethral Catheter:     Connect To:  Straight Drainage/Gravity    Indication:  Urine Output Monitoring in Critically Ill (04-12-20 @ 14:02) (not performed) [active] SUBJECTIVE:    Patient is a 59y old Male who presents with a chief complaint of ARf (17 Apr 2020 09:35)    INTERVAL EVENTS: pt breathing over vent, currently off all drips, need neurology f/u     PAST MEDICAL & SURGICAL HISTORY  No significant past surgical history      ALLERGIES:  No Known Allergies    MEDICATIONS:  STANDING MEDICATIONS  cefTRIAXone   IVPB 2000 milliGRAM(s) IV Intermittent every 24 hours  chlorhexidine 0.12% Liquid 15 milliLiter(s) Oral Mucosa every 12 hours  chlorhexidine 4% Liquid 1 Application(s) Topical <User Schedule>  dextrose 5%. 1000 milliLiter(s) IV Continuous <Continuous>  dextrose 50% Injectable 12.5 Gram(s) IV Push once  dextrose 50% Injectable 25 Gram(s) IV Push once  dextrose 50% Injectable 25 Gram(s) IV Push once  dextrose 50% Injectable 50 milliLiter(s) IV Push once  dextrose 50% Injectable 50 milliLiter(s) IV Push once  heparin  Injectable 5000 Unit(s) SubCutaneous every 8 hours  influenza   Vaccine 0.5 milliLiter(s) IntraMuscular once  insulin regular Infusion 2 Unit(s)/Hr IV Continuous <Continuous>  methylPREDNISolone sodium succinate Injectable 60 milliGRAM(s) IV Push two times a day  norepinephrine Infusion 1.5 MICROgram(s)/kG/Min IV Continuous <Continuous>  QUEtiapine 50 milliGRAM(s) Oral daily  sodium zirconium cyclosilicate 10 Gram(s) Oral every 12 hours    PRN MEDICATIONS  acetaminophen   Tablet .. 650 milliGRAM(s) Oral every 6 hours PRN  dextrose 40% Gel 15 Gram(s) Oral once PRN  glucagon  Injectable 1 milliGRAM(s) IntraMuscular once PRN    VITALS:   T(F): 96.3  HR: 90  BP: 123/65  RR: 26  SpO2: 100%    LABS:                        7.6    23.02 )-----------( 206      ( 17 Apr 2020 04:30 )             23.4     04-17    154<H>  |  113<H>  |  134<HH>  ----------------------------<  204<H>  4.3   |  26  |  2.4<H>    Ca    8.5      17 Apr 2020 04:30    TPro  4.9<L>  /  Alb  2.3<L>  /  TBili  0.3  /  DBili  x   /  AST  91<H>  /  ALT  249<H>  /  AlkPhos  61  04-17        ABG - ( 17 Apr 2020 08:15 )  pH, Arterial: 7.46  pH, Blood: x     /  pCO2: 37    /  pO2: 153   / HCO3: 26    / Base Excess: 2.3   /  SaO2: 99                        RADIOLOGY:    PHYSICAL EXAM:  GEN: intubated off sedation  PULM/CHEST: Clear to auscultation bilaterally,   CVS: Regular rate and rhythm, S1-S2, no murmurs  ABD: Soft, non-tender, non-distended, +BS  EXT: No edema  NEURO: AAOx3    Patino Catheter:   Indwelling Urethral Catheter:     Connect To:  Straight Drainage/Gravity    Indication:  Urine Output Monitoring in Critically Ill (04-12-20 @ 14:02) (not performed) [active] SUBJECTIVE:    Patient is a 59y old Male who presents with a chief complaint of ARf (17 Apr 2020 09:35)    INTERVAL EVENTS: pt breathing over vent, currently off all drips, need neurology f/u, palliative spoke with family pt is full code    PAST MEDICAL & SURGICAL HISTORY  No significant past surgical history      ALLERGIES:  No Known Allergies    MEDICATIONS:  STANDING MEDICATIONS  cefTRIAXone   IVPB 2000 milliGRAM(s) IV Intermittent every 24 hours  chlorhexidine 0.12% Liquid 15 milliLiter(s) Oral Mucosa every 12 hours  chlorhexidine 4% Liquid 1 Application(s) Topical <User Schedule>  dextrose 5%. 1000 milliLiter(s) IV Continuous <Continuous>  dextrose 50% Injectable 12.5 Gram(s) IV Push once  dextrose 50% Injectable 25 Gram(s) IV Push once  dextrose 50% Injectable 25 Gram(s) IV Push once  dextrose 50% Injectable 50 milliLiter(s) IV Push once  dextrose 50% Injectable 50 milliLiter(s) IV Push once  heparin  Injectable 5000 Unit(s) SubCutaneous every 8 hours  influenza   Vaccine 0.5 milliLiter(s) IntraMuscular once  insulin regular Infusion 2 Unit(s)/Hr IV Continuous <Continuous>  methylPREDNISolone sodium succinate Injectable 60 milliGRAM(s) IV Push two times a day  norepinephrine Infusion 1.5 MICROgram(s)/kG/Min IV Continuous <Continuous>  QUEtiapine 50 milliGRAM(s) Oral daily  sodium zirconium cyclosilicate 10 Gram(s) Oral every 12 hours    PRN MEDICATIONS  acetaminophen   Tablet .. 650 milliGRAM(s) Oral every 6 hours PRN  dextrose 40% Gel 15 Gram(s) Oral once PRN  glucagon  Injectable 1 milliGRAM(s) IntraMuscular once PRN    VITALS:   T(F): 96.3  HR: 90  BP: 123/65  RR: 26  SpO2: 100%    LABS:                        7.6    23.02 )-----------( 206      ( 17 Apr 2020 04:30 )             23.4     04-17    154<H>  |  113<H>  |  134<HH>  ----------------------------<  204<H>  4.3   |  26  |  2.4<H>    Ca    8.5      17 Apr 2020 04:30    TPro  4.9<L>  /  Alb  2.3<L>  /  TBili  0.3  /  DBili  x   /  AST  91<H>  /  ALT  249<H>  /  AlkPhos  61  04-17        ABG - ( 17 Apr 2020 08:15 )  pH, Arterial: 7.46  pH, Blood: x     /  pCO2: 37    /  pO2: 153   / HCO3: 26    / Base Excess: 2.3   /  SaO2: 99                        RADIOLOGY:    PHYSICAL EXAM:  GEN: intubated off sedation  PULM/CHEST: Clear to auscultation bilaterally,   CVS: Regular rate and rhythm, S1-S2, no murmurs  ABD: Soft, non-tender, non-distended, +BS  EXT: No edema  NEURO: AAOx3    Patino Catheter:   Indwelling Urethral Catheter:     Connect To:  Straight Drainage/Gravity    Indication:  Urine Output Monitoring in Critically Ill (04-12-20 @ 14:02) (not performed) [active]

## 2020-04-17 NOTE — PROGRESS NOTE ADULT - ASSESSMENT
59M from home, lives with family, with no pertinent PMHx or PSHx presents to the ED with complaints of one week of a cough and shortness of breath.    IMPRESSION;   COVID19 with resolving septic shock, off  pressors, mechanical ventilation with ARDS with FiO2 40%, secondary to Cytokine Release Syndrome  -end organ damage with renal failure and mild transaminitis  -CXR with b/l opacities   -inflammatory markers significantly elevated. d Dimer [id number]  -elevated Ddimer and Ferritin are poor prognostic indicators and differentiate survivors from non survivors  procal of 1.5 suggestive of a bacterial coinfection  BCx NG  WBc 21.5  nares ORSA NG  bronch 4/10 NG    RECOMMENDATIONS;   s/p PLAQUENIL  s/p rocephin  prognosis is very poor

## 2020-04-17 NOTE — PROGRESS NOTE ADULT - ASSESSMENT
This is a 59 year old male with no PMHx who presented with complaints of shortness of breath and cough for one week    #Acute Hypoxemic Respiratory Failure secondary to viral pneumonia as a result of COVID-19 with Cytokine Storm   - COVID-19 Positive  - Intubated off sedation , off pressors   - Completed Hydroxychloroquine, Anakinra, Azithromycin,   - Cultures negative    #Suspected Anoxic Brain injury secondary to Code Blue  -CTH : consistent with hypoxic ischemic encephalopathy  -EEG: Performed - Pending Results  -need Neuro f/u    #TAMIKA  -creatinine downtrending 2.4 was 3.1  - Nephrology Consult Appreciated  - Urine osmol 496 April 13  - switch to  D5 1/2 @ 70  - Continues to have good urine output  - Avoid nephrotoxins  - Nepro Feeds  - Continue Lokelma 10g q12    # Hypernatremia   - Today 154  - Urine osmol 496 April 13  - Continue on Free water with feeds  - c/w  D5 1/2 @ 70  - Will monitor    # Activity: Bedrest  # Diet: NPO with tube feeds - nephro  # DVT ppx: Heparin  # GI ppx: Protonix  # Code Status: Full Code  # DISPO: Acute This is a 59 year old male with no PMHx who presented with complaints of shortness of breath and cough for one week  #palliative- spoke to family who wants aggressive management -full code  #Acute Hypoxemic Respiratory Failure secondary to viral pneumonia as a result of COVID-19 with Cytokine Storm   - COVID-19 Positive  - Intubated off sedation , off pressors   - Completed Hydroxychloroquine, Anakinra, Azithromycin,   - Cultures negative    #Suspected Anoxic Brain injury secondary to Code Blue- as pt breathing over vent this is not brain death  -CTH : consistent with hypoxic ischemic encephalopathy  -EEG: Performed - Pending Results  -need Neuro f/u    #TAMIKA  -creatinine downtrending 2.4 was 3.1  - Nephrology Consult Appreciated  - Urine osmol 496 April 13  - switch to  D5 1/2 @ 70  - Continues to have good urine output  - Avoid nephrotoxins  - Nepro Feeds  - Continue Lokelma 10g q12    # Hypernatremia   - Today 154  - Urine osmol 496 April 13  - Continue on Free water with feeds  - c/w  D5 1/2 @ 70  - Will monitor    # Activity: Bedrest  # Diet: NPO with tube feeds - nephro  # DVT ppx: Heparin  # GI ppx: Protonix  # Code Status: Full Code  # DISPO: Acute

## 2020-04-17 NOTE — CHART NOTE - NSCHARTNOTEFT_GEN_A_CORE
Spoke with wife at [phone]  and updated on patient's status. Answered questions, addressed concerns.

## 2020-04-17 NOTE — CHART NOTE - NSCHARTNOTEFT_GEN_A_CORE
Registered Dietitian Follow-Up     Patient Profile Reviewed                           Yes [x]   No []     Nutrition History Previously Obtained        Yes []  No [x]  intubated     Pertinent Subjective Information: intubated/sedated, TF changed to Nepro      Pertinent Medical Interventions: Acute Hypoxemic Respiratory Failure secondary to viral pneumonia as a result of COVID-19 with Cytokine Storm. Off sedation. Suspected Anoxic Brain injury secondary to Code Blue- as pt breathing over vent this is not brain death. Hypernatremia Improving today. Hyperkalemia - lokelma. Palliative following - Full code     Diet order: Nepro @ 120 ml q6hr (provides 864 kcals, 38 gms protein, 350 ml free water, 500 mg K+, 340 mg Phos)     Anthropometrics:  - Ht. 162.6 cm   - Wt. 75.4 kg (3/31) no new weights   - %wt change  - BMI 28.5   - IBW     Pertinent Lab Data: (4/17) H/H 7.6/23.4  POCT glucose 230, 194  Na 154    Cr 2.4  GFR 28       Pertinent Meds: heparin, insulin, lokelma, insulin, levophed, solumedrol     Physical Findings:  - Appearance: intubated   - GI function: LBM 4/5, rectal tube   - Tubes: OGT  - Oral/Mouth cavity: NPO  - Skin: intact      Nutrition Requirements  Weight Used: 75.4 kg, slightly adjusted Ve: 11.2  Tmax: 36.8  , needs comparable to last RD note      CALORIES: ~1550 - 1750 kcals/day   ~1696 kcals/day ([id number]) vs. 6670-3886 kcals (20-25 kcal/kg CBW)  PROTEIN: 90 - 113 gms/day (1.2 - 1.5 gm/kg CBW)  FLUID: per LIP     Nutrient Intake: 864 kcals, 38 gms protein        [] Previous Nutrition Diagnosis: Inadequate protein energy intake.             [x] Ongoing          [] Resolved    [] No active nutrition diagnosis identified at this time    Nutrition Intervention  EN      Goal/Expected Outcome: Pt to meet % of needs in 4 days      Indicator/Monitoring: RD to monitor energy intake, diet order, body comp, NFPF, renal/glucose/lytes profile     Recommendation: Nepro not indicated for Critical Care setting, Change feeds to low carb formula Peptamen AF @ 250ml q6hr + no carb prosource TF pkts 2x/day (provides 1280kcal, 98 gms protein, 810ml free water, 1600mg K+, 800mg Phos) Flushes per LIP.

## 2020-04-17 NOTE — PROGRESS NOTE ADULT - SUBJECTIVE AND OBJECTIVE BOX
Patient is a 59y old  Male who presents with a chief complaint of sob (16 Apr 2020 11:50)      Over Night Events:  Patient seen and examined.   still over breathing when put on cpap been triggering the vent and breathing     ROS:  See HPI    PHYSICAL EXAM    ICU Vital Signs Last 24 Hrs  T(C): 35.7 (17 Apr 2020 08:00), Max: 36.8 (16 Apr 2020 09:46)  T(F): 96.3 (17 Apr 2020 08:00), Max: 98.3 (16 Apr 2020 09:46)  HR: 90 (17 Apr 2020 09:33) (89 - 107)  BP: 129/66 (17 Apr 2020 09:33) (116/80 - 137/51)  BP(mean): --  ABP: --  ABP(mean): --  RR: 26 (17 Apr 2020 09:33) (22 - 26)  SpO2: 100% (17 Apr 2020 09:33) (95% - 100%)      General: not awake no gag today   HEENT:  et tubwe               Lymph Nodes: NO cervical LN   Lungs: Bilateral BS  Cardiovascular: Regular   Abdomen: Soft, Positive BS  Extremities: No clubbing   Skin: warm   Neurological: no gag   Musculoskeletal: move all ext     I&O's Detail    16 Apr 2020 07:01  -  17 Apr 2020 07:00  --------------------------------------------------------  IN:  Total IN: 0 mL    OUT:    Indwelling Catheter - Urethral: 1600 mL    Voided: 500 mL  Total OUT: 2100 mL    Total NET: -2100 mL      17 Apr 2020 07:01  -  17 Apr 2020 09:35  --------------------------------------------------------  IN:  Total IN: 0 mL    OUT:    Indwelling Catheter - Urethral: 800 mL  Total OUT: 800 mL    Total NET: -800 mL          LABS:                          7.6    23.02 )-----------( 206      ( 17 Apr 2020 04:30 )             23.4         17 Apr 2020 04:30    154    |  113    |  134    ----------------------------<  204    4.3     |  26     |  2.4      Ca    8.5        17 Apr 2020 04:30    TPro  4.9    /  Alb  2.3    /  TBili  0.3    /  DBili  x      /  AST  91     /  ALT  249    /  AlkPhos  61     17 Apr 2020 04:30  Amylase x     lipase x                                                                                                                                                                                                 Mode: PCV  RR (machine): 22  FiO2: 40  PEEP: 8  PC: 20  PIP: 20                                      ABG - ( 17 Apr 2020 08:15 )  pH, Arterial: 7.46  pH, Blood: x     /  pCO2: 37    /  pO2: 153   / HCO3: 26    / Base Excess: 2.3   /  SaO2: 99                  MEDICATIONS  (STANDING):  cefTRIAXone   IVPB 2000 milliGRAM(s) IV Intermittent every 24 hours  chlorhexidine 0.12% Liquid 15 milliLiter(s) Oral Mucosa every 12 hours  chlorhexidine 4% Liquid 1 Application(s) Topical <User Schedule>  dextrose 5%. 1000 milliLiter(s) (70 mL/Hr) IV Continuous <Continuous>  dextrose 50% Injectable 12.5 Gram(s) IV Push once  dextrose 50% Injectable 25 Gram(s) IV Push once  dextrose 50% Injectable 25 Gram(s) IV Push once  dextrose 50% Injectable 50 milliLiter(s) IV Push once  dextrose 50% Injectable 50 milliLiter(s) IV Push once  heparin  Injectable 5000 Unit(s) SubCutaneous every 8 hours  influenza   Vaccine 0.5 milliLiter(s) IntraMuscular once  insulin regular Infusion 2 Unit(s)/Hr (2 mL/Hr) IV Continuous <Continuous>  methylPREDNISolone sodium succinate Injectable 60 milliGRAM(s) IV Push two times a day  norepinephrine Infusion 1.5 MICROgram(s)/kG/Min (106 mL/Hr) IV Continuous <Continuous>  QUEtiapine 50 milliGRAM(s) Oral daily  sodium zirconium cyclosilicate 10 Gram(s) Oral every 12 hours    MEDICATIONS  (PRN):  acetaminophen   Tablet .. 650 milliGRAM(s) Oral every 6 hours PRN Temp greater or equal to 38C (100.4F)  dextrose 40% Gel 15 Gram(s) Oral once PRN Blood Glucose LESS THAN 70 milliGRAM(s)/deciliter  glucagon  Injectable 1 milliGRAM(s) IntraMuscular once PRN Glucose LESS THAN 70 milligrams/deciliter          Xrays:  TLC:  OG:  ET tube:                                                                                    stable    ECHO:  [facility] ICU:

## 2020-04-17 NOTE — PROGRESS NOTE ADULT - SUBJECTIVE AND OBJECTIVE BOX
AMAYA OTREGA  59y, Male    All available historical data reviewed    OVERNIGHT EVENTS:    [id number]  ROS:  unable to obtain history secondary to patient's mental status and/or sedation     VITALS:  T(F): 96.3, Max: 97 (04-16-20 @ 18:00)  HR: 86  BP: 114/65  RR: 22Vital Signs Last 24 Hrs  T(C): 35.7 (17 Apr 2020 08:00), Max: 36.1 (16 Apr 2020 18:00)  T(F): 96.3 (17 Apr 2020 08:00), Max: 97 (16 Apr 2020 18:00)  HR: 86 (17 Apr 2020 12:22) (86 - 107)  BP: 114/65 (17 Apr 2020 12:08) (114/65 - 135/66)  BP(mean): --  RR: 22 (17 Apr 2020 12:08) (22 - 26)  SpO2: 100% (17 Apr 2020 12:22) (97% - 100%)    TESTS & MEASUREMENTS:                        7.6    23.02 )-----------( 206      ( 17 Apr 2020 04:30 )             23.4     04-17    154<H>  |  113<H>  |  134<HH>  ----------------------------<  204<H>  4.3   |  26  |  2.4<H>    Ca    8.5      17 Apr 2020 04:30    TPro  4.9<L>  /  Alb  2.3<L>  /  TBili  0.3  /  DBili  x   /  AST  91<H>  /  ALT  249<H>  /  AlkPhos  61  04-17    LIVER FUNCTIONS - ( 17 Apr 2020 04:30 )  Alb: 2.3 g/dL / Pro: 4.9 g/dL / ALK PHOS: 61 U/L / ALT: 249 U/L / AST: 91 U/L / GGT: x             Culture - Bronchial (collected 04-10-20 @ 15:00)  Source: Bronch Wash Bronchoalveolar Lavage  Gram Stain (04-11-20 @ 03:22):    Few polymorphonuclear leukocytes seen per low power field    Moderate Squamous epithelial cells seen per low power field    Moderate Gram positive cocci in pairs seen per oil power field  Final Report (04-12-20 @ 17:44):    Normal Respiratory Quynh present            RADIOLOGY & ADDITIONAL TESTS:  Personal review of radiological diagnostics performed  Echo and EKG results noted when applicable.     MEDICATIONS:  acetaminophen   Tablet .. 650 milliGRAM(s) Oral every 6 hours PRN  cefTRIAXone   IVPB 2000 milliGRAM(s) IV Intermittent every 24 hours  chlorhexidine 0.12% Liquid 15 milliLiter(s) Oral Mucosa every 12 hours  chlorhexidine 4% Liquid 1 Application(s) Topical <User Schedule>  dextrose 40% Gel 15 Gram(s) Oral once PRN  dextrose 5%. 1000 milliLiter(s) IV Continuous <Continuous>  dextrose 50% Injectable 12.5 Gram(s) IV Push once  dextrose 50% Injectable 25 Gram(s) IV Push once  dextrose 50% Injectable 25 Gram(s) IV Push once  dextrose 50% Injectable 50 milliLiter(s) IV Push once  dextrose 50% Injectable 50 milliLiter(s) IV Push once  glucagon  Injectable 1 milliGRAM(s) IntraMuscular once PRN  heparin  Injectable 5000 Unit(s) SubCutaneous every 8 hours  influenza   Vaccine 0.5 milliLiter(s) IntraMuscular once  insulin regular Infusion 2 Unit(s)/Hr IV Continuous <Continuous>  methylPREDNISolone sodium succinate Injectable 60 milliGRAM(s) IV Push two times a day  norepinephrine Infusion 1.5 MICROgram(s)/kG/Min IV Continuous <Continuous>  sodium zirconium cyclosilicate 10 Gram(s) Oral every 12 hours      ANTIBIOTICS:  cefTRIAXone   IVPB 2000 milliGRAM(s) IV Intermittent every 24 hours

## 2020-04-17 NOTE — ED ADULT NURSE REASSESSMENT NOTE - NS ED NURSE REASSESS COMMENT FT1
Insulin infusion held at 2100 for FS of 102, restarted @ 3u per hr @ 2200 for FS of 112. Will recheck FS as ordered and f/u accordingly. Scheduled tube feeding to be administered at 0000. Patient remains on cardiac monitoring with stable V/S no s/s of distress noted.
Patient assessed VSS. Bed bath and perennial care provided. Patient turned and Positioned q 2 hours and pillows used to elevate both arms and heals. Patient noted to have loose stools in rectal tube, and swelling to bilateral upper extremities.
pt is intubated, continuous insulin drip at 13 unitshr, NS @ 75 ml/hr, NSR on cardiac monitor, nad, will continue to monitor
Unable to draw back from IVs, central line nor right a-line. MD [id number] made aware.

## 2020-04-18 NOTE — PROGRESS NOTE ADULT - ASSESSMENT
Assessment:    Acute hypoxemic respiratory failure secondary to SARS-COV-2  SARS-COV-2 PNA worsening status  Betsey improving   hyperkalemia   s/p code Blue ??  anoxic encephalopathy / uremia   PLAN:    CNS: follow urology       HEENT:  Oral care    PULMONARY:  HOB @ 45 degrees,  keep same vent setting     CARDIOVASCULAR:     GI: GI prophylaxis Protonix                                         Feeding: NG feed   FREE WATER 250cc Q4 hrs     RENAL:  F/u  lytes.on inuslin   Lokelma as per renal   check CK level   d5 w @ 100cc/ hr   follow renal     INFECTIOUS DISEASE s/p HCQ    bld cx   on Rocephin   follow  DTA   bld cx     HEMATOLOGICAL:  DVT prophylaxis.    ENDOCRINE:  Follow up FS.  Insulin protocol if needed.    MUSCULOSKELETAL: Bed rest for now    CODE STATUS: FULL CODE    DISPOSITION: Patient require continued monitoring in MICU.  guarded  prognosis  pallaitive care

## 2020-04-18 NOTE — PROGRESS NOTE ADULT - SUBJECTIVE AND OBJECTIVE BOX
Patient is a 59y old  Male who presents with a chief complaint of ARf (17 Apr 2020 09:35)      Over Night Events:  Patient seen and examined still breathing over the vent and on cpap  he triggering the vent breathing   pulling 650 TV     ROS:  See HPI    PHYSICAL EXAM    ICU Vital Signs Last 24 Hrs  T(C): --  T(F): --  HR: 106 (18 Apr 2020 08:17) (85 - 106)  BP: 103/57 (18 Apr 2020 08:17) (103/57 - 147/68)  BP(mean): 98 (17 Apr 2020 17:08) (96 - 98)  ABP: --  ABP(mean): --  RR: 22 (17 Apr 2020 20:25) (22 - 26)  SpO2: 95% (18 Apr 2020 08:17) (95% - 100%)      General: not awake   HEENT:     et tube            Lymph Nodes: NO cervical LN   Lungs: Bilateral BS  Cardiovascular: Regular   Abdomen: Soft, Positive BS  Extremities: No clubbing   Skin: warm   Neurological: mild gag   Musculoskeletal: move all ext     I&O's Detail    17 Apr 2020 07:01  -  18 Apr 2020 07:00  --------------------------------------------------------  IN:    Free Water: 200 mL    Nepro with Carb Steady: 240 mL  Total IN: 440 mL    OUT:    Indwelling Catheter - Urethral: 2300 mL  Total OUT: 2300 mL    Total NET: -1860 mL          LABS:                          6.7    28.60 )-----------( 230      ( 18 Apr 2020 08:00 )             20.3         17 Apr 2020 04:30    154    |  113    |  134    ----------------------------<  204    4.3     |  26     |  2.4      Ca    8.5        17 Apr 2020 04:30                                                                                                                                                                                               Mode: IPAP 20  RR (machine): 22  FiO2: 40  PEEP: 5  PC: 20  PIP: 20                                      ABG - ( 18 Apr 2020 04:30 )  pH, Arterial: 7.50  pH, Blood: x     /  pCO2: 31    /  pO2: 193   / HCO3: 24    / Base Excess: 1.4   /  SaO2: 100                 MEDICATIONS  (STANDING):  chlorhexidine 0.12% Liquid 15 milliLiter(s) Oral Mucosa every 12 hours  chlorhexidine 4% Liquid 1 Application(s) Topical <User Schedule>  dextrose 5%. 1000 milliLiter(s) (70 mL/Hr) IV Continuous <Continuous>  dextrose 50% Injectable 12.5 Gram(s) IV Push once  dextrose 50% Injectable 25 Gram(s) IV Push once  dextrose 50% Injectable 25 Gram(s) IV Push once  dextrose 50% Injectable 50 milliLiter(s) IV Push once  dextrose 50% Injectable 50 milliLiter(s) IV Push once  heparin  Injectable 5000 Unit(s) SubCutaneous every 8 hours  influenza   Vaccine 0.5 milliLiter(s) IntraMuscular once  insulin regular Infusion 2 Unit(s)/Hr (2 mL/Hr) IV Continuous <Continuous>  methylPREDNISolone sodium succinate Injectable 60 milliGRAM(s) IV Push two times a day  norepinephrine Infusion 1.5 MICROgram(s)/kG/Min (106 mL/Hr) IV Continuous <Continuous>  sodium zirconium cyclosilicate 10 Gram(s) Oral every 12 hours    MEDICATIONS  (PRN):  acetaminophen   Tablet .. 650 milliGRAM(s) Oral every 6 hours PRN Temp greater or equal to 38C (100.4F)  dextrose 40% Gel 15 Gram(s) Oral once PRN Blood Glucose LESS THAN 70 milliGRAM(s)/deciliter  glucagon  Injectable 1 milliGRAM(s) IntraMuscular once PRN Glucose LESS THAN 70 milligrams/deciliter          Xrays:  TLC:  OG:  ET tube:                                                                                    stable    ECHO:  [facility] ICU:

## 2020-04-18 NOTE — PROGRESS NOTE ADULT - ASSESSMENT
This is a 59 year old male with no PMHx who presented with complaints of shortness of breath and cough for one week    #palliative- spoke to family who wants aggressive management -full code  #Acute Hypoxemic Respiratory Failure secondary to viral pneumonia as a result of COVID-19 with Cytokine Storm   - COVID-19 Positive  - Intubated off sedation , off pressors   - Completed Hydroxychloroquine, [place], Azithromycin,   - Cultures negative    #Suspected Anoxic Brain injury secondary to Code Blue- as pt breathing over vent this is not brain death  -CTH : consistent with hypoxic ischemic encephalopathy  -EEG: Performed - Pending Results  -need Neuro f/u    #TAMIKA  -creatinine downtrending 1.9 >> 2.4 >> 3.1  - Nephrology Consult Appreciated  - Urine osmol 496 April 13  -  D5 1/2 @ 100  - Continues to have good urine output  - Avoid nephrotoxins  - Nepro Feeds  - Continue Lokelma 10g q12    # Hypernatremia   - Today 157  - Urine osmol 496 April 13  - Continue on Free water with feeds  - c/w  D5 1/2 @ 100  - Will monitor    # Normocytic Anemia  - Hgb has been progressively decreasing from baseline of ~ 14 on admission   - no sign of bleeding via stool or OGT  - repeat CBC and transfuse PRN      # Activity: Bedrest  # Diet: NPO with tube feeds - nephro  # DVT ppx: Heparin  # GI ppx: Protonix  # Code Status: Full Code  # DISPO: Acute

## 2020-04-18 NOTE — PROGRESS NOTE ADULT - SUBJECTIVE AND OBJECTIVE BOX
SUBJECTIVE:    Patient is a 59y old Male who presents with a chief complaint of ARF (18 Apr 2020 08:28)    Today is hospital day 18d. Patient remains intubated. no events overnight.     PAST MEDICAL & SURGICAL HISTORY  No significant past surgical history    SOCIAL HISTORY:  Negative for smoking/alcohol/drug use.     ALLERGIES:  No Known Allergies    MEDICATIONS:  STANDING MEDICATIONS  acetaminophen   Tablet .. 650 milliGRAM(s) Oral every 6 hours PRN Temp greater or equal to 38C (100.4F)  chlorhexidine 0.12% Liquid 15 milliLiter(s) Oral Mucosa every 12 hours  chlorhexidine 4% Liquid 1 Application(s) Topical <User Schedule>  dextrose 40% Gel 15 Gram(s) Oral once PRN Blood Glucose LESS THAN 70 milliGRAM(s)/deciliter  dextrose 5%. 1000 milliLiter(s) (100 mL/Hr) IV Continuous <Continuous>  dextrose 50% Injectable 12.5 Gram(s) IV Push once  dextrose 50% Injectable 25 Gram(s) IV Push once  dextrose 50% Injectable 25 Gram(s) IV Push once  dextrose 50% Injectable 50 milliLiter(s) IV Push once  dextrose 50% Injectable 50 milliLiter(s) IV Push once  glucagon  Injectable 1 milliGRAM(s) IntraMuscular once PRN Glucose LESS THAN 70 milligrams/deciliter  heparin  Injectable 5000 Unit(s) SubCutaneous every 8 hours  influenza   Vaccine 0.5 milliLiter(s) IntraMuscular once  insulin regular Infusion 2 Unit(s)/Hr (2 mL/Hr) IV Continuous <Continuous>  methylPREDNISolone sodium succinate Injectable 60 milliGRAM(s) IV Push two times a day  norepinephrine Infusion 1.5 MICROgram(s)/kG/Min (106 mL/Hr) IV Continuous <Continuous>  sodium zirconium cyclosilicate 10 Gram(s) Oral every 12 hours    PRN MEDICATIONS  acetaminophen   Tablet .. 650 milliGRAM(s) Oral every 6 hours PRN  dextrose 40% Gel 15 Gram(s) Oral once PRN  glucagon  Injectable 1 milliGRAM(s) IntraMuscular once PRN    VITALS:   T(F): 98.9  HR: 103 (95 - 117)  BP: 117/63 (103/57 - 147/68)  RR: 22 (22 - 22)  SpO2: 98% (95% - 100%)    LABS:                        6.7    28.60 )-----------( 230      ( 18 Apr 2020 08:00 )             20.3     04-18    157<H>  |  120<H>  |  134<HH>  ----------------------------<  97  3.6   |  23  |  1.9<H>    Ca    7.3<L>      18 Apr 2020 08:00    TPro  4.4<L>  /  Alb  2.2<L>  /  TBili  <0.2  /  DBili  x   /  AST  64<H>  /  ALT  157<H>  /  AlkPhos  55  04-18    ABG - ( 18 Apr 2020 04:30 )  pH, Arterial: 7.50  pH, Blood: x     /  pCO2: 31    /  pO2: 193   / HCO3: 24    / Base Excess: 1.4   /  SaO2: 100       PHYSICAL EXAM:  GEN: Pt intubated.  LUNGS: Symmetrical inspiration, no increased work of breathing  HEART: +S1,S2, RRR  ABD: Bowel Sounds Present, Soft, non tender, non distended, no guarding, no rebound.   EXT:Bl UE/LE edema

## 2020-04-18 NOTE — CHART NOTE - NSCHARTNOTEFT_GEN_A_CORE
Spoke with wife at [phone]  and updated on patient's status. Answered questions, addressed concerns.

## 2020-04-19 NOTE — PROGRESS NOTE ADULT - ATTENDING COMMENTS
Patient examined this afternoon and on no sedation.  No response to voice and deep painful stimuli (sternal rub, supraorbital notch pressure, pressure on nailbeds and twisting skin of chest).  Pupils midposition and fixed.   Absent corneal responses, absent oculocephalic responses, absent cold water caloric responses.  Absent gag and cough.  No withdrawal or posturing to painful stimuli inextremities.  Patient does not currently appear to be overbreathing the ventilator though has been noted to over past few days rendering him not completely brainstem areflexic.  If patient is no longer overbreathing the ventilator then may perform apnea testing.  Irregardless of weather a formal diagnosis of brain death is able to be made over the next few days he has no reasonable chance for meaningful recovery.

## 2020-04-19 NOTE — PROGRESS NOTE ADULT - ASSESSMENT
Assessment:    Acute hypoxemic respiratory failure secondary to SARS-COV-2  SARS-COV-2 PNA worsening status  Betsey improving   hyperkalemia   s/p code Blue ??  anoxic encephalopathy / uremia   PLAN:    CNS: follow urology       HEENT:  Oral care    PULMONARY:  HOB @ 45 degrees,  keep same vent setting lower fio2 to 40%    CARDIOVASCULAR:   keep is = os   GI: GI prophylaxis Protonix                                         Feeding: NG feed   FREE WATER 250cc Q4 hrs     RENAL:  F/u  lytes.on inuslin   Lokelma as per renal   check CK level  follow renal   continue iv fluid   INFECTIOUS DISEASE s/p HCQ    bld cx follow   s/p  Rocephin   follow  DTA   bld cx     HEMATOLOGICAL:  DVT prophylaxis. follow h/h transfuse if less hb then 7     ENDOCRINE:  Follow up FS.  Insulin protocol if needed.    MUSCULOSKELETAL: Bed rest for now    CODE STATUS: FULL CODE    DISPOSITION: Patient require continued monitoring in MICU.  guarded  prognosis  pallaitive care regarding terminal wean or tracheostomy

## 2020-04-19 NOTE — PROGRESS NOTE ADULT - SUBJECTIVE AND OBJECTIVE BOX
Patient is a 59y old  Male who presents with a chief complaint of Respiritory failure. (19 Apr 2020 03:00)      Over Night Events:  Patient seen and examined still triggering vent pulling tv       ROS:  See HPI    PHYSICAL EXAM    ICU Vital Signs Last 24 Hrs  T(C): 36.1 (19 Apr 2020 06:53), Max: 37.2 (18 Apr 2020 11:03)  T(F): 97 (19 Apr 2020 06:53), Max: 98.9 (18 Apr 2020 11:03)  HR: 113 (19 Apr 2020 07:55) (103 - 126)  BP: 164/69 (19 Apr 2020 07:55) (67/48 - 164/69)  BP(mean): 63 (19 Apr 2020 01:00) (63 - 73)  ABP: --  ABP(mean): --  RR: 18 (19 Apr 2020 07:55) (18 - 96)  SpO2: 100% (19 Apr 2020 07:55) (96% - 100%)      General: not awake   HEENT:                Lymph Nodes: NO cervical LN   Lungs: Bilateral BS  Cardiovascular: Regular   Abdomen: Soft, Positive BS  Extremities: No clubbing   Skin: warm   Neurological: over breathing and triggering vent   Musculoskeletal: move all ext     I&O's Detail    18 Apr 2020 07:01  -  19 Apr 2020 07:00  --------------------------------------------------------  IN:    norepinephrine Infusion: 0.7 mL  Total IN: 0.7 mL    OUT:    Indwelling Catheter - Urethral: 800 mL  Total OUT: 800 mL    Total NET: -799.3 mL          LABS:                          7.1    26.10 )-----------( 280      ( 19 Apr 2020 06:21 )             23.2         19 Apr 2020 06:21    146    |  104    |  154    ----------------------------<  232    5.1     |  21     |  3.4      Ca    7.7        19 Apr 2020 06:21    TPro  4.9    /  Alb  2.1    /  TBili  0.6    /  DBili  x      /  AST  79     /  ALT  135    /  AlkPhos  70     19 Apr 2020 06:21  Amylase x     lipase x                                                                                                                                                                                                 Mode: IPAP 20  RR (machine): 22  FiO2: 60  PEEP: 8  PC: 18  PIP: 18                                      ABG - ( 18 Apr 2020 04:30 )  pH, Arterial: 7.50  pH, Blood: x     /  pCO2: 31    /  pO2: 193   / HCO3: 24    / Base Excess: 1.4   /  SaO2: 100                 MEDICATIONS  (STANDING):  chlorhexidine 0.12% Liquid 15 milliLiter(s) Oral Mucosa every 12 hours  chlorhexidine 4% Liquid 1 Application(s) Topical <User Schedule>  dextrose 5%. 1000 milliLiter(s) (100 mL/Hr) IV Continuous <Continuous>  dextrose 50% Injectable 12.5 Gram(s) IV Push once  dextrose 50% Injectable 25 Gram(s) IV Push once  dextrose 50% Injectable 25 Gram(s) IV Push once  dextrose 50% Injectable 50 milliLiter(s) IV Push once  dextrose 50% Injectable 50 milliLiter(s) IV Push once  heparin  Injectable 5000 Unit(s) SubCutaneous every 8 hours  influenza   Vaccine 0.5 milliLiter(s) IntraMuscular once  insulin regular Infusion 2 Unit(s)/Hr (2 mL/Hr) IV Continuous <Continuous>  methylPREDNISolone sodium succinate Injectable 60 milliGRAM(s) IV Push two times a day  norepinephrine Infusion 1.5 MICROgram(s)/kG/Min (106 mL/Hr) IV Continuous <Continuous>  sodium zirconium cyclosilicate 10 Gram(s) Oral every 12 hours    MEDICATIONS  (PRN):  acetaminophen   Tablet .. 650 milliGRAM(s) Oral every 6 hours PRN Temp greater or equal to 38C (100.4F)  dextrose 40% Gel 15 Gram(s) Oral once PRN Blood Glucose LESS THAN 70 milliGRAM(s)/deciliter  glucagon  Injectable 1 milliGRAM(s) IntraMuscular once PRN Glucose LESS THAN 70 milligrams/deciliter          Xrays:  TLC:  OG:  ET tube:                                                                                    mild opacity    ECHO:  [facility] ICU:

## 2020-04-19 NOTE — PROGRESS NOTE ADULT - ASSESSMENT
TAMIKA/ hyperkalemia/ hypernatremia/ acute respiratory failure/ COVID posyive   # creatinine  rising, no indication for HD  # non oliguric   # BUN noted : highly catabolic , and steroids, prerenal   # k improving  continue lokelma 10g q 12  # hypernatremia,  i imrpoved   # followed by palliative care  # no acute indication for RRT   # will follow    # overall prognosis poor        Discussed w/ primary team

## 2020-04-19 NOTE — PROGRESS NOTE ADULT - REASON FOR ADMISSION
Shortness of Breath
COVID 19
COVID+
PNA
Transferred from [facility] where he presented with Cough and Shortness of breath
resp failure
sob
ARF
Shortness of breath
cough and shortness of breath
ARF
ARF
SOB
transfer for Covid19
Respiritory failure.

## 2020-04-19 NOTE — PROGRESS NOTE ADULT - SUBJECTIVE AND OBJECTIVE BOX
AMAYA ORTEGA  59y, Male    All available historical data reviewed    OVERNIGHT EVENTS:  no fevers  Fio2 60%    ROS:  unable to obtain history secondary to patient's mental status and/or sedation     VITALS:  T(F): 97, Max: 97.7 (04-18-20 @ 19:50)  HR: 114  BP: 164/69  RR: 18Vital Signs Last 24 Hrs  T(C): 36.1 (19 Apr 2020 06:53), Max: 36.5 (18 Apr 2020 19:50)  T(F): 97 (19 Apr 2020 06:53), Max: 97.7 (18 Apr 2020 19:50)  HR: 114 (19 Apr 2020 11:00) (104 - 126)  BP: 164/69 (19 Apr 2020 07:55) (67/48 - 164/69)  BP(mean): 63 (19 Apr 2020 01:00) (63 - 73)  RR: 18 (19 Apr 2020 07:55) (18 - 96)  SpO2: 100% (19 Apr 2020 11:00) (96% - 100%)    TESTS & MEASUREMENTS:                        7.1    26.10 )-----------( 280      ( 19 Apr 2020 06:21 )             23.2     04-19    146  |  104  |  154<HH>  ----------------------------<  232<H>  5.1<H>   |  21  |  3.4<H>    Ca    7.7<L>      19 Apr 2020 06:21    TPro  4.9<L>  /  Alb  2.1<L>  /  TBili  0.6  /  DBili  x   /  AST  79<H>  /  ALT  135<H>  /  AlkPhos  70  04-19    LIVER FUNCTIONS - ( 19 Apr 2020 06:21 )  Alb: 2.1 g/dL / Pro: 4.9 g/dL / ALK PHOS: 70 U/L / ALT: 135 U/L / AST: 79 U/L / GGT: x                   RADIOLOGY & ADDITIONAL TESTS:  Personal review of radiological diagnostics performed  Echo and EKG results noted when applicable.     MEDICATIONS:  acetaminophen   Tablet .. 650 milliGRAM(s) Oral every 6 hours PRN  chlorhexidine 0.12% Liquid 15 milliLiter(s) Oral Mucosa every 12 hours  chlorhexidine 4% Liquid 1 Application(s) Topical <User Schedule>  dextrose 40% Gel 15 Gram(s) Oral once PRN  dextrose 5%. 1000 milliLiter(s) IV Continuous <Continuous>  dextrose 50% Injectable 12.5 Gram(s) IV Push once  dextrose 50% Injectable 25 Gram(s) IV Push once  dextrose 50% Injectable 25 Gram(s) IV Push once  dextrose 50% Injectable 50 milliLiter(s) IV Push once  dextrose 50% Injectable 50 milliLiter(s) IV Push once  glucagon  Injectable 1 milliGRAM(s) IntraMuscular once PRN  heparin  Injectable 5000 Unit(s) SubCutaneous every 8 hours  influenza   Vaccine 0.5 milliLiter(s) IntraMuscular once  insulin regular Infusion 2 Unit(s)/Hr IV Continuous <Continuous>  methylPREDNISolone sodium succinate Injectable 60 milliGRAM(s) IV Push two times a day  norepinephrine Infusion 1.5 MICROgram(s)/kG/Min IV Continuous <Continuous>  sodium zirconium cyclosilicate 10 Gram(s) Oral every 12 hours      ANTIBIOTICS:

## 2020-04-19 NOTE — PROGRESS NOTE ADULT - PROVIDER SPECIALTY LIST ADULT
Critical Care
Infectious Disease
Internal Medicine
Intervent Radiology
Nephrology
Neurology
Neurology
Palliative Care
Pulmonology
Critical Care

## 2020-04-19 NOTE — PROGRESS NOTE ADULT - SUBJECTIVE AND OBJECTIVE BOX
Nephrology progress note    Patient was seen and examined, events over the last 24 h noted .  cr rising   non -olgiruic     Allergies:  No Known Allergies    Hospital Medications:   MEDICATIONS  (STANDING):  chlorhexidine 0.12% Liquid 15 milliLiter(s) Oral Mucosa every 12 hours  chlorhexidine 4% Liquid 1 Application(s) Topical <User Schedule>  dextrose 5%. 1000 milliLiter(s) (100 mL/Hr) IV Continuous <Continuous>  dextrose 50% Injectable 12.5 Gram(s) IV Push once  dextrose 50% Injectable 25 Gram(s) IV Push once  dextrose 50% Injectable 25 Gram(s) IV Push once  dextrose 50% Injectable 50 milliLiter(s) IV Push once  dextrose 50% Injectable 50 milliLiter(s) IV Push once  heparin  Injectable 5000 Unit(s) SubCutaneous every 8 hours  influenza   Vaccine 0.5 milliLiter(s) IntraMuscular once  insulin regular Infusion 2 Unit(s)/Hr (2 mL/Hr) IV Continuous <Continuous>  methylPREDNISolone sodium succinate Injectable 60 milliGRAM(s) IV Push two times a day  norepinephrine Infusion 1.5 MICROgram(s)/kG/Min (106 mL/Hr) IV Continuous <Continuous>  sodium zirconium cyclosilicate 10 Gram(s) Oral every 12 hours        VITALS:  T(F): 96.8 (04-19-20 @ 11:30), Max: 97.7 (04-18-20 @ 19:50)  HR: 112 (04-19-20 @ 11:30)  BP: 125/58 (04-19-20 @ 11:30)  RR: 22 (04-19-20 @ 11:30)  SpO2: 100% (04-19-20 @ 11:30)  Wt(kg): --    04-17 @ 07:01  -  04-18 @ 07:00  --------------------------------------------------------  IN: 440 mL / OUT: 2300 mL / NET: -1860 mL    04-18 @ 07:01  -  04-19 @ 07:00  --------------------------------------------------------  IN: 0.7 mL / OUT: 800 mL / NET: -799.3 mL          PHYSICAL EXAM:  	Gen: intubated/ventilated    LABS:  04-19    146  |  104  |  154<HH>  ----------------------------<  232<H>  5.1<H>   |  21  |  3.4<H>    Ca    7.7<L>      19 Apr 2020 06:21    TPro  4.9<L>  /  Alb  2.1<L>  /  TBili  0.6  /  DBili      /  AST  79<H>  /  ALT  135<H>  /  AlkPhos  70  04-19                          7.1    26.10 )-----------( 280      ( 19 Apr 2020 06:21 )             23.2       Urine Studies:    Osmolality, Random Urine: 496 mos/kg (04-13 @ 12:15)    RADIOLOGY & ADDITIONAL STUDIES:

## 2020-04-19 NOTE — PROGRESS NOTE ADULT - ASSESSMENT
· Assessment		  59M from home, lives with family, with no pertinent PMHx or PSHx presents to the ED with complaints of one week of a cough and shortness of breath.    IMPRESSION;   COVID19 with resolving septic shock, off  pressors, mechanical ventilation with ARDS with FiO2 60%, secondary to Cytokine Release Syndrome  -end organ damage with renal failure and mild transaminitis  -CXR with b/l opacities   -inflammatory markers significantly elevated. d Dimer [id number]  -elevated Ddimer and Ferritin are poor prognostic indicators and differentiate survivors from non survivors  procal of 1.5 suggestive of a bacterial coinfection  BCx NG  WBc 21.5  nares ORSA NG  bronch 4/10 NG    RECOMMENDATIONS;  s/p PLAQUENIL  s/p rocephin  repeat BCx  serum fungitell assay  prognosis is very poor

## 2020-04-19 NOTE — PROGRESS NOTE ADULT - NSREFPHYEXINPTDOCREFER_GEN_ALL_CORE
4/2
4/1
4/10
4/14
4/14 - progress note
4/17
4/9
Progress noted from 4/16 and Neuro note from 4/15

## 2020-04-19 NOTE — PROGRESS NOTE ADULT - SUBJECTIVE AND OBJECTIVE BOX
Neurology Progress Note  This morning patient remains vented, off sedation and on vasopressors. Patient with fixed pupils, no gag reflex, no corneal reflex but breathing over the vent.       Vital Signs Last 24 Hrs  T(C): 37.2 (18 Apr 2020 11:03), Max: 37.2 (18 Apr 2020 11:03)  T(F): 98.9 (18 Apr 2020 11:03), Max: 98.9 (18 Apr 2020 11:03)  HR: 122 (19 Apr 2020 00:19) (103 - 122)  BP: 100/62 (18 Apr 2020 17:11) (80/60 - 124/60)  BP(mean): --  RR: --  SpO2: 96% (19 Apr 2020 00:19) (95% - 98%)    Neurological Exam:   Mechanically vented off sedation, not following commands   Not tracking. No purposeful movements.   Pupils right 4mm and left 5 mm not reactive to light.    Motor:  No movements throughout all four extremities.   Sensation: no reaction to peripheral or central noxious stimuli.      Medications:  MEDICATIONS  (STANDING):  chlorhexidine 0.12% Liquid 15 milliLiter(s) Oral Mucosa every 12 hours  chlorhexidine 4% Liquid 1 Application(s) Topical <User Schedule>  dextrose 5%. 1000 milliLiter(s) (100 mL/Hr) IV Continuous <Continuous>  dextrose 50% Injectable 12.5 Gram(s) IV Push once  dextrose 50% Injectable 25 Gram(s) IV Push once  dextrose 50% Injectable 25 Gram(s) IV Push once  dextrose 50% Injectable 50 milliLiter(s) IV Push once  dextrose 50% Injectable 50 milliLiter(s) IV Push once  heparin  Injectable 5000 Unit(s) SubCutaneous every 8 hours  influenza   Vaccine 0.5 milliLiter(s) IntraMuscular once  insulin regular Infusion 2 Unit(s)/Hr (2 mL/Hr) IV Continuous <Continuous>  methylPREDNISolone sodium succinate Injectable 60 milliGRAM(s) IV Push two times a day  norepinephrine Infusion 1.5 MICROgram(s)/kG/Min (106 mL/Hr) IV Continuous <Continuous>  sodium zirconium cyclosilicate 10 Gram(s) Oral every 12 hours        Labs:  CBC Full  -  ( 18 Apr 2020 19:20 )  WBC Count : 25.55 K/uL  RBC Count : 2.19 M/uL  Hemoglobin : 6.7 g/dL  Hematocrit : 21.1 %  Platelet Count - Automated : 253 K/uL  Mean Cell Volume : 96.3 fL  Mean Cell Hemoglobin : 30.6 pg  Mean Cell Hemoglobin Concentration : 31.8 g/dL  Auto Neutrophil # : x  Auto Lymphocyte # : x  Auto Monocyte # : x  Auto Eosinophil # : x  Auto Basophil # : x  Auto Neutrophil % : x  Auto Lymphocyte % : x  Auto Monocyte % : x  Auto Eosinophil % : x  Auto Basophil % : x    04-18    155<H>  |  114<H>  |  150<HH>  ----------------------------<  137<H>  4.2   |  26  |  2.3<H>    Ca    8.3<L>      18 Apr 2020 19:20    TPro  4.4<L>  /  Alb  2.2<L>  /  TBili  <0.2  /  DBili  x   /  AST  64<H>  /  ALT  157<H>  /  AlkPhos  55  04-18    LIVER FUNCTIONS - ( 18 Apr 2020 08:00 )  Alb: 2.2 g/dL / Pro: 4.4 g/dL / ALK PHOS: 55 U/L / ALT: 157 U/L / AST: 64 U/L / GGT: x Neurology Progress Note  This morning patient remains vented, off sedation and on vasopressors. Patient with fixed pupils, no gag reflex, no corneal reflex but breathing over the vent.       Vital Signs Last 24 Hrs  T(C): 37.2 (18 Apr 2020 11:03), Max: 37.2 (18 Apr 2020 11:03)  T(F): 98.9 (18 Apr 2020 11:03), Max: 98.9 (18 Apr 2020 11:03)  HR: 122 (19 Apr 2020 00:19) (103 - 122)  BP: 100/62 (18 Apr 2020 17:11) (80/60 - 124/60)  BP(mean): --  RR: --  SpO2: 96% (19 Apr 2020 00:19) (95% - 98%)    Neurological Exam:   Mechanically vented off sedation, not following commands   Not tracking. No purposeful movements.   Pupils right 4mm and left 5 mm not reactive to light.    Motor:  No movements throughout all four extremities.   Sensation: no reaction to peripheral or central noxious stimuli.      Medications:  MEDICATIONS  (STANDING):  chlorhexidine 0.12% Liquid 15 milliLiter(s) Oral Mucosa every 12 hours  chlorhexidine 4% Liquid 1 Application(s) Topical <User Schedule>  dextrose 5%. 1000 milliLiter(s) (100 mL/Hr) IV Continuous <Continuous>  dextrose 50% Injectable 12.5 Gram(s) IV Push once  dextrose 50% Injectable 25 Gram(s) IV Push once  dextrose 50% Injectable 25 Gram(s) IV Push once  dextrose 50% Injectable 50 milliLiter(s) IV Push once  dextrose 50% Injectable 50 milliLiter(s) IV Push once  heparin  Injectable 5000 Unit(s) SubCutaneous every 8 hours  influenza   Vaccine 0.5 milliLiter(s) IntraMuscular once  insulin regular Infusion 2 Unit(s)/Hr (2 mL/Hr) IV Continuous <Continuous>  methylPREDNISolone sodium succinate Injectable 60 milliGRAM(s) IV Push two times a day  norepinephrine Infusion 1.5 MICROgram(s)/kG/Min (106 mL/Hr) IV Continuous <Continuous>  sodium zirconium cyclosilicate 10 Gram(s) Oral every 12 hours        Labs:  CBC Full  -  ( 18 Apr 2020 19:20 )  WBC Count : 25.55 K/uL  RBC Count : 2.19 M/uL  Hemoglobin : 6.7 g/dL  Hematocrit : 21.1 %  Platelet Count - Automated : 253 K/uL  Mean Cell Volume : 96.3 fL  Mean Cell Hemoglobin : 30.6 pg  Mean Cell Hemoglobin Concentration : 31.8 g/dL  Auto Neutrophil # : x  Auto Lymphocyte # : x  Auto Monocyte # : x  Auto Eosinophil # : x  Auto Basophil # : x  Auto Neutrophil % : x  Auto Lymphocyte % : x  Auto Monocyte % : x  Auto Eosinophil % : x  Auto Basophil % : x    04-18    155<H>  |  114<H>  |  150<HH>  ----------------------------<  137<H>  4.2   |  26  |  2.3<H>    Ca    8.3<L>      18 Apr 2020 19:20    TPro  4.4<L>  /  Alb  2.2<L>  /  TBili  <0.2  /  DBili  x   /  AST  64<H>  /  ALT  157<H>  /  AlkPhos  55  04-18    LIVER FUNCTIONS - ( 18 Apr 2020 08:00 )  Alb: 2.2 g/dL / Pro: 4.4 g/dL / ALK PHOS: 55 U/L / ALT: 157 U/L / AST: 64 U/L / GGT: x           < from: CT Head No Cont (04.12.20 @ 12:10) >  IMPRESSION:    In comparison with the prior noncontrast CT scan of the brain dated September 21, 2019:    Interval development of findings consistent with hypoxic ischemic encephalopathy.    < end of copied text >

## 2020-04-19 NOTE — PROGRESS NOTE ADULT - ASSESSMENT
Impression:  This is a 59y Male w/ h/o respiratory failure and Covid-19 infection.  On current exam patient continues to have no brainstem reflexes but is breathing 27 breaths per minute and vent set to 22.     Suggestion:  Continue supportive care.   Follow up palliative care.       Davis Roberson NP  [id number] Impression:  This is a 59y Male w/ h/o respiratory failure and Covid-19 infection.  On current exam patient continues to have no brainstem reflexes but is breathing 27 breaths per minute and vent set to 22. Suggested hypoxic ischemic encephalopathy on CTH.    Suggestion:  Continue supportive care.   Follow up palliative care.       Davis Roberson NP  [phone]

## 2020-04-19 NOTE — PROGRESS NOTE ADULT - NSREFPHYEXREFTO_GEN_ALL_CORE
ED Physical Exam
Inpatient Physical Exam

## 2020-04-20 NOTE — DISCHARGE NOTE FOR THE EXPIRED PATIENT - HOSPITAL COURSE
59M from home, lives with family, with no pertinent PMHx or PSHx presents to the ED with complaints of one week of a cough and shortness of breath.    On  April 9th, pt was a code blue for bradycardia in which ROSC was achieved after 4 rounds of CPR and 3 epinephrines. On April 12, pt underwent CT head which showed changes c/w hypoxic brain injury. 59M from home, lives with family, with no pertinent PMHx or PSHx presents to the ED with complaints of one week of a cough and shortness of breath. Intubated on April 2nd. Pt remained gravely ill, not improving despite HCQ, azithro, anakinra and optimized on vent therapy.    On  April 9th, pt was a code blue for bradycardia in which ROSC was achieved after 10 minutes of cpr and 4 rounds of CPR and 3 epinephrines. On April 12, pt underwent CT head which showed findings c/w hypoxic ischemic encephalopathy. Pt remained relatively stable thereafter, however did not wake up while vented off sedation and pressors.     On April 20th  was told by RN pt had no pulse, pt was assessed, no pulse detected, code blue called, compressions were started promptly. Monitor showed PEA, pt received 1 round of calcium gluconate, 2 epis and 1 amp of bicarb, ROSC was not achieved despite 8 minutes of cpr.    Pt was pronounced at 4:34 AM. Wife Shweta contacted at .

## 2020-04-20 NOTE — CHART NOTE - NSCHARTNOTESELECT_GEN_ALL_CORE
Rapid Response
Communication Care Team -Attending Note
Communication Care Team -Attending Note
Communication Care Team- Attending
Communications Team
EPS-Monitor
Electrophysiology PA
Event Note
Event Note/CODE BLUE
Event Note/Communication
Event Note/Communication team
Event Note/Palliative SW -Palliative
Transfer Note/Upgrade
spoke to wife/Event Note

## 2020-04-20 NOTE — DISCHARGE NOTE FOR THE EXPIRED PATIENT - REASON FOR ADMISSION
ARF The patient is currently admitted for the primary diagnosis of  Acute hypoxic respiratory failure secondary to SARS -COv2 pneumonia requiring supplemental oxygen

## 2020-04-20 NOTE — CHART NOTE - NSCHARTNOTEFT_GEN_A_CORE
At ~4:25 Am was told by RN pt had no pulse, assessed pt, no pulse detected, code blue called, compressions started promptly. Monitor showed _______, pt received one round of calcium gluconate, 2 epis and one amp of bicarb, ROSC was not achieved after 8 minutes of cpr. Pt was pronounced at 4:34 AM. Wife Shweta contacted at . At ~4:25 Am was told by RN pt had no pulse, assessed pt, no pulse detected, code blue called, compressions started promptly. Monitor showed PEA, pt received one round of calcium gluconate, 2 epis and one amp of bicarb, ROSC was not achieved after 8 minutes of cpr. Pt was pronounced at 4:34 AM. Wife Shweta contacted at .

## 2020-04-27 DIAGNOSIS — R19.7 DIARRHEA, UNSPECIFIED: ICD-10-CM

## 2020-04-27 DIAGNOSIS — R65.21 SEVERE SEPSIS WITH SEPTIC SHOCK: ICD-10-CM

## 2020-04-27 DIAGNOSIS — R05 COUGH: ICD-10-CM

## 2020-04-27 DIAGNOSIS — N17.9 ACUTE KIDNEY FAILURE, UNSPECIFIED: ICD-10-CM

## 2020-04-27 DIAGNOSIS — R68.0 HYPOTHERMIA, NOT ASSOCIATED WITH LOW ENVIRONMENTAL TEMPERATURE: ICD-10-CM

## 2020-04-27 DIAGNOSIS — E87.5 HYPERKALEMIA: ICD-10-CM

## 2020-04-27 DIAGNOSIS — J80 ACUTE RESPIRATORY DISTRESS SYNDROME: ICD-10-CM

## 2020-04-27 DIAGNOSIS — D72.810 LYMPHOCYTOPENIA: ICD-10-CM

## 2020-04-27 DIAGNOSIS — E66.9 OBESITY, UNSPECIFIED: ICD-10-CM

## 2020-04-27 DIAGNOSIS — I46.9 CARDIAC ARREST, CAUSE UNSPECIFIED: ICD-10-CM

## 2020-04-27 DIAGNOSIS — E87.0 HYPEROSMOLALITY AND HYPERNATREMIA: ICD-10-CM

## 2020-04-27 DIAGNOSIS — R00.1 BRADYCARDIA, UNSPECIFIED: ICD-10-CM

## 2020-04-27 DIAGNOSIS — R74.0 NONSPECIFIC ELEVATION OF LEVELS OF TRANSAMINASE AND LACTIC ACID DEHYDROGENASE [LDH]: ICD-10-CM

## 2020-04-27 DIAGNOSIS — G93.1 ANOXIC BRAIN DAMAGE, NOT ELSEWHERE CLASSIFIED: ICD-10-CM

## 2020-04-27 DIAGNOSIS — D64.9 ANEMIA, UNSPECIFIED: ICD-10-CM

## 2020-04-27 DIAGNOSIS — J12.89 OTHER VIRAL PNEUMONIA: ICD-10-CM

## 2020-04-27 DIAGNOSIS — A41.9 SEPSIS, UNSPECIFIED ORGANISM: ICD-10-CM

## 2020-04-27 DIAGNOSIS — I49.01 VENTRICULAR FIBRILLATION: ICD-10-CM

## 2020-04-27 DIAGNOSIS — R73.9 HYPERGLYCEMIA, UNSPECIFIED: ICD-10-CM

## 2020-04-27 DIAGNOSIS — U07.1 COVID-19: ICD-10-CM

## 2020-04-27 DIAGNOSIS — E87.4 MIXED DISORDER OF ACID-BASE BALANCE: ICD-10-CM

## 2021-03-25 NOTE — PROCEDURE NOTE - NSANTIMICROB_VASC_A_CORE
Yes Summary Of Other Records Reviewed: All pertinent clinical and/or referral notes, photographs, treatment regimens (to include current and previous medication dosages), current and past treatment plans, as well as any pertinent diagnostic and/or laboratory test results were reviewed during this visit to optimize both patient safety and efficacy of care. Detail Level: Zone

## 2021-08-03 NOTE — PATIENT PROFILE ADULT - SAFE PLACE TO LIVE
Please notify cxr c/w interstitial lung disease which needs further workup and place pulmonary referral.  Thx.  am
no

## 2022-11-15 NOTE — ED ADULT NURSE NOTE - CAS DISCH TRANSFER METHOD
Walking Ketoconazole Counseling:   Patient counseled regarding improving absorption with orange juice.  Adverse effects include but are not limited to breast enlargement, headache, diarrhea, nausea, upset stomach, liver function test abnormalities, taste disturbance, and stomach pain.  There is a rare possibility of liver failure that can occur when taking ketoconazole. The patient understands that monitoring of LFTs may be required, especially at baseline. The patient verbalized understanding of the proper use and possible adverse effects of ketoconazole.  All of the patient's questions and concerns were addressed.

## 2023-04-27 NOTE — PROVIDER CONTACT NOTE (OTHER) - DATE AND TIME:
[address]  [address] [facility] [address] [id number] [phone]                                 4/27/2023     Procedure Performed Today:     Cystoscopy - Looked up through your urinary channel into your bladder with a special instrument. PROBLEMS    In going to the bathroom, you may experience some burning, discomfort, increased frequency and possibly some blood in your urine. DO THIS FIRST    Increasing the amount of fluids you drink will help dilute your urine and relieve these symptoms sooner. Also rest will help to slow the bleeding down. THEN CALL IF    You develop a fever greater than 101. If you are unable to urinate. If your urine becomes bright red and doesn't clear with increased fluids and rest.   If burning, frequency, or discomfort become worse. 01-Apr-2020 08:30

## 2024-08-05 NOTE — ED PROVIDER NOTE - TEMPLATE
Physical Therapy Daily Treatment Note      Patient: Barry Cam   : 1953  Referring practitioner: Noah Salcedo MD  Date of Initial Visit: Type: THERAPY  Noted: 2024  Today's Date: 2024  Patient seen for 13 sessions       Visit Diagnoses:    ICD-10-CM ICD-9-CM   1. Status post reverse total arthroplasty of right shoulder  Z96.611 V43.61   2. Impaired functional mobility and activity tolerance  Z74.09 V49.89       Subjective   No new changes. Got a set of pulleys. Shoulder is stiff.    Objective   See Exercise, Manual, and Modality Logs for complete treatment.       Assessment/Plan  Continues to have limited PROM overhead that improves w/ passive stretching. Tolerates exercise well w/o c/o pain. Progress per POC.      Timed:         Manual Therapy:    8     mins  74409;     Therapeutic Exercise:    30     mins  19841;     Neuromuscular Elizabeth:    0    mins  67459;    Therapeutic Activity:     0     mins  30552;     Gait Trainin     mins  16948;     Ultrasound:     0     mins  35212;    Ionto                               0    mins   11394  Self Care                       0     mins   12963      Un-Timed:  Electrical Stimulation:    0     mins  24777 ( );  Dry Needling     0     mins self-pay  Traction     0     mins [id number]  Canalith Repos    0     mins 86478    Timed Treatment:   38   mins   Total Treatment:     38   mins    Patria Wynn, PT  [place] License: [id number]                  
Fall

## 2025-02-11 NOTE — ED PROVIDER NOTE - PRO INTERPRETER NEED 2
CARDIOVASCULAR OFFICE VISIT    Patient: Marielos Price, #[id number] Date: 2/11/2025 TIME: 10:51 AM   YOB: 1952 Attending: Devyn Renteria MD   72 year old female Primary Attending: Akanksha Mclean DO     REASON FOR VISIT:   Chief Complaint   Patient presents with    Follow-up     Ongoing chest pain, unspecified type, cold sweats all during day and night along with fainting spells, right side temple pain last (2 days)     Marielos Price is a 72 year old female who has a past medical history of severe MR s/p Mitraclip 2011, severe PHTN, nonischemic CMP, HFrEF, HTN, HLD, PE, anemia, CKD3, COPD, hx GIB, ISIDRO, former smoker. The patient presents today for a post-hospital visit follow up.    The patient presents today unaccompanied. Reports stable SOB and chest pain upon walking, which she notes radiates into her back. Additionally, the patient notes lightheadedness and dizziness. Most recent cardiac labs and testing were reviewed with the patient during their appointment. The patient is compliant with all cardiac medications and reports tolerating them well. No reports of palpitations, sense of arrhythmia, heart racing, syncope, orthopnea, fluid retention in abdomen or LE, or fatigue. No further complaints at this time.     REVIEW OF SYSTEMS     All other ROS otherwise negative except as detailed in HPI.     HISTORIES      I have reviewed the past medical history, family history, social history, medications and allergies listed in the medical record as obtained by my nursing staff and support staff and agree with their documentation.    Past Surgical History:   Procedure Laterality Date    Breast reduction surgery  12/07/2016    bilateral reduction - Dr Jewell    Breast surgery procedure unlisted Bilateral 08/24/2017    Minor/ Excision dogears    Cardiac catheterization/possible ptca/possible stent  11/02/2022    Colonoscopy diagnostic  03/12/2021    Colorec canc scrn,colonoscopy not hi risk   04/15/2013    Dr. Romero/Colorectal Screening/recall 4/15/2018    Enteroscopy  11/19/2021    Ablation of 3 small bowel AVMs    Esophagogastroduodenoscopy transoral flex diag  03/13/2021    w small bowel capsulotome    Lumbar epidural injection  04/19/2017    Lumbar epidural injection  09/06/2017    Pain management    Lumbar epidural injection  02/21/2018    Lumbar JOSE    Lumbar epidural injection  04/25/2019    Lumbar JOSE    Lumbar epidural injection  06/13/2019    Lumbar JOSE    Lumbar epidural injection Left 08/03/2021    Lumbar epidural injection  07/28/2022    Mitral valve repair  11/11/2010    mitral clip    Nerve block Right 05/29/2019    Right knee    Radiofrequency ablation knee Right 08/18/2020    Right heart cath  04/16/2019    Right heart cath  03/01/2021    Severe Pulm HTN    Steroid injection knee Bilateral 09/05/2018    Tooth extraction  2017       Current Outpatient Medications   Medication Sig Dispense Refill    carvedilol (Coreg) 3.125 MG tablet Take 1 tablet by mouth in the morning and 1 tablet in the evening. Take with meals. 180 tablet 3    sacubitril-valsartan (Entresto) 49-51 MG per tablet Take 1 tablet by mouth in the morning and 1 tablet in the evening. 180 tablet 3    potassium CHLORIDE (KLOR-CON M) 20 MEQ krupa ER tablet Take 1 tablet by mouth daily. 90 tablet 3    spironolactone (ALDACTONE) 25 MG tablet Take 0.5 tablets by mouth daily. 45 tablet 3    fluticasone-salmeterol 250-50 MCG/ACT inhaler Inhale 1 puff into the lungs in the morning and 1 puff in the evening. 1 each 5    atorvastatin (Lipitor) 20 MG tablet Take 1 tablet by mouth daily. 90 tablet 1    Xarelto 20 MG Tab TAKE 1 TABLET BY MOUTH DAILY WITH DINNER 30 tablet 1    diclofenac (VOLTAREN) 1 % gel APPLY 4 G TOPICALLY 4 TIMES DAILY AS NEEDED FOR PAIN 300 g 1    Farxiga 10 MG tablet TAKE 1 TABLET BY MOUTH ONCE A DAY 90 tablet 3    Ventolin  (90 Base) MCG/ACT inhaler INHALE 2 PUFFS BY MOUTH EVERY 4 HOURS AS NEEDED FOR SHORTNESS  OF BREATH 3 each 0    naproxen (NAPROSYN) 500 MG tablet Take 500 mg by mouth.      Protonix 40 MG tablet TAKE 1 TABLET BY MOUTH ONCE A DAY 90 tablet 0    albuterol (VENTOLIN) (2.5 MG/3ML) 0.083% nebulizer solution INHALE 3 MILLILITERS VIA NEBULIZER EVERY 6 HOURS AS NEEDED FOR WHEEZING OR SHORTNESS OF BREATH 3 mL 1    amoxicillin (AMOXIL) 500 MG capsule Take 4 capsules 1 hour before dental work. 8 capsule 0    potassium chloride (K-TAB) 20 MEQ ER tablet Take 1 tablet by mouth daily.      allopurinol (ZYLOPRIM) 300 MG tablet Take 1 tablet by mouth daily.      polyethylene glycol (MIRALAX) 17 GM/SCOOP powder Take 17 g by mouth every other day. Stir and dissolve powder in any 4 to 8 ounces of beverage, then drink. 238 g 11    acetaminophen (TYLENOL) 500 MG tablet Take 1,000 mg by mouth every 6 hours as needed for Pain.       No current facility-administered medications for this visit.       PHYSICAL EXAM         11/9/2024     3:19 PM 1/31/2025     8:32 AM 2/11/2025     9:54 AM 2/11/2025    10:24 AM 2/11/2025    10:26 AM   [facility] Extended Vitals - Weight in Kg/Lb   /72 104/58 124/81 112/77 111/77   Pulse 71 76 74 69 96   Resp 16 16 16     Temp 98.4 °F (36.9 °C)       Weight kg  95.255 kg 94.167 kg     Weight lb  210 lb 207 lb 9.6 oz     Height 5' 2\" 5' 2\" 5' 2\"     Height cm 157.5 cm 157.5 cm 157.5 cm     BMI  38.41 37.97     Pulse Ox 100 % 98 % 100 %     Patient Position   Sitting Supine Standing   BP Location   LUE - Left upper extremity LUE - Left upper extremity LUE - Left upper extremity   Cuff Size   Large Adult Large Adult Large Adult     General: Good general appearance, no acute distress. Normal weight.  Skin:  Warm and dry  Head:  Normocephalic and atraumatic.   Neck:  Trachea is midline. No adenopathy. Normal thyroid without mass or tenderness. No carotid bruit.   Cardiovascular: regular rate, regular rhythm, S1 and S2, no murmur, no click, or no rub. No S3 or S4. Absent jugular venous distension. Trace  lower extremity edema. Radial pulses 2+ symmetrically. No ulcers, gangrene or trophic changes.   Chest and Respiratory:  Normal respiratory effort. No wheezes, crackles or rhonchi bilaterally.  Extremities: No gross deformity in upper and lower extremities.    LABS     Recent labs and imaging reports reviewed in electronic medical record.    Lab Results   Component Value Date    SODIUM 142 10/10/2024    POTASSIUM 3.8 10/10/2024    BUN 14 10/10/2024    CREATININE 1.08 (H) 10/10/2024    WBC 3.7 (L) 01/30/2024    HCT 41.0 01/30/2024    HGB 12.7 01/30/2024    INR 1.3 12/01/2022     01/30/2024    HTROPI 8 01/30/2024    PTT 24 11/18/2021    GLUCOSE 94 10/10/2024    TSH 2.224 03/02/2021    BNP 98 05/21/2018    CHOLESTEROL 146 11/02/2022    HDL 64 11/02/2022    CALCLDL 62 11/02/2022    TRIGLYCERIDE 101 11/02/2022    MG 2.4 11/18/2022     DIAGNOSTIC TESTING/IMAGING     Cardiac Event Monitor 02/08/2021  Normal sinus rhythm/sinus bradycardia with heart rates  beats per minute with an average of 76 beats per minute.  An episode of supraventricular tachycardia with a heart rate of 159 beats per minute was noted.  Onset and offset not provided-duration unknown.  Two brief episodes of nonsustained ventricular tachycardia were also noted longest 4 beats.  Ventricular rate was 127 beats per minute.  Overall PVC burden 4%, PAC burden 1%.    NM Stress Test 11/22/2021  IMPRESSION:  1. Two small-sized (each 1 segment), mild severity, fixed perfusion  defects, among the apical inferior and apical septal walls. No reversible  perfusion defect.  2. Mildly dilated LV cavity size, with low-normal global systolic function  and no regional wall motion abnormalities; LVEF 53%.  3. Cardiac Risk Assessment: intermediate   4. Compared to the study performed on 6/22/2020, the current study is  worse. In particular, LV size is now mildly enlarged and there are two  small, fixed perfusion defects (as described).    Echo  10/18/2022  Mildly increased left ventricular chamber size.  Normal left ventricular wall thickness.  Left ventricular ejection fraction; 30 %.  Global left ventricular hypokinesis.  Normal right ventricular chamber size.  Mildly decreased right ventricular systolic function.  Pulmonary artery systolic pressure 50 - 58 mmHg.  Severely increased left atrial chamber size.  Increased right atrial chamber size.  Post MV clip.  Mitral valve mean gradient 9 mmHg at HR = 82 b/m.  At least moderate mitral valve regurgitation.  Mild tricuspid valve regurgitation.  No pericardial effusion.  On prior limited TTE , dated 5/27/22 , MV function not assessed. Now PA pressure is higher ( at higher BP ).    Cardiac Cath 11/02/2022  Key Findings/Plan  Angiographic normal epicardial coronary arteries  Moderately elevated left sided filling pressures with mixed pre and post capillary severe pulmonary hypertension and mildly depressed cardiac indices    RHC 02/20/2023   Interpretation:  1) Mildly elevated central venous pressure with severely elevated pulmonary artery wedge pressure.  2) Severe combined pre- and post-capillary pulmonary hypertension.  3) Low cardiac output.    Limited Echo 07/24/2023  * Upper normal left ventricular chamber size.    * Moderately decreased left ventricular systolic function.    * Left ventricular ejection fraction, 41%.    * Moderate global left ventricular hypokinesis.    * Reduced LV global longitudinal strain, -11%.    * At least Grade I diastolic dysfunction.    * Normal right ventricular chamber size.    * Mildly decreased right ventricular systolic function.    * Severely increased left atrial chamber size.    * s/p MitraClip, well-seated, with resulting double orifice.    * Mitral valve mean gradient 5 mmHg at heart rate 72 bpm.    * Moderate mitral valve regurgitation.    * Mild-moderate tricuspid valve regurgitation.    * Moderate pulmonary hypertension, RVSP 51 mmHg.    * Compared to the TTE  performed on 10/18/2022, the current study has improved. In particular, global LV systolic function has increased, and is now moderately reduced.    NM Stress Test 04/13/2024 ([place])  1. No evidence of ischemia. Small infarcts in the apical and basal inferior wall.   2. Left ventricular ejection fraction of 51%.   3. This is a low risk positive result.     Echo 10/10/2024    * The left ventricle is mildly enlarged with mild global systolic  dysfunction; LVEF 41%.  Increased diastolic filling pressures. LV Global longitudinal strain -14.0 %.    * Normal right ventricular size with mildly reduced systolic function.  RV longitudinal strain -24 %. Mild pulmonary hypertension; RVSP 48 mmHg.    * Bi-atrial enlargement.    * There is a MitraClip device observed approximating A2-P2 position. The clip is stable with minimal motion within the cardiac cycle. There is mild residual regurgitation originating lateral to the clip. Mean gradient 4 mmHg at 56 bpm.    * Mild-moderate tricuspid valve regurgitation.    * No pericardial effusion.    * Compared to the prior study, the degree of residual mitral regurgitation has lessened.    ASSESSMENT AND PLAN     Severe mitral regurgitation: S/p Mitraclip 2011. Well-seated, MG 4 mmHg, mild residual MR per Echo 10/2024. Endorses stable SOB. Will continue to monitor.       Nonischemic cardiomyopathy / HFrEF: Improved EF 41% (from 30% in 10/2022) per most recent Echo 10/2024. NT proBNP of 275 in 10/2024. Normal coronaries per [facility] 2022. No overt HF on exam. Reports stable SOB with occasional lightheadedness/dizziness. On BB/entresto/aldactone/farxiga. Followed by A[facility].      Tricuspid valve insufficiency: Mild-moderate TR per Echo in 10/2024. Will continue to monitor.      Pulmonary hypertension: Mild with RVSP 48 mmHg per echo 10/2024. Continue current regimen.      Hx PE: Anticoagulated on xarelto.      Hypertension: Controlled, /77. On carvedilol 3.125 mg BID, entresto 49-51 mg  BID, aldactone 12.5 mg daily.      Dyslipidemia: LDL 62 (11/02/22). On atorvastatin 20 mg daily.     Recommendations:  - Stable from cardiac standpoint.  - Continue current medication regimen.   - Remain physically active as tolerated.   - Repeat Echo prior to follow up to reassess heart structure/function.       Return in about 6 months (around 8/11/2025). Call or return to clinic as needed if these symptoms worsen, fail to improve as anticipated, or if new symptoms develop.    On 2/11/2025, David HUNTER scribed the services personally performed by Devyn Renteria MD     English

## 2025-02-28 NOTE — PATIENT PROFILE ADULT - NSPROMEDSPATCH_GEN_A_NUR
Render In Strict Bullet Format?: No
Detail Level: Zone
Initiate Treatment: Tacrolimus
Continue Regimen: Clobetasol
none

## 2025-03-11 NOTE — DIETITIAN INITIAL EVALUATION ADULT. - CONTINUE CURRENT NUTRITION CARE PLAN
EN/yes Procedure To Be Performed At Next Visit: Acne Surgery X Size Of Lesion In Cm (Optional): 0 Detail Level: Zone Introduction Text (Please End With A Colon): The following procedure was deferred: